# Patient Record
Sex: MALE | Race: OTHER
[De-identification: names, ages, dates, MRNs, and addresses within clinical notes are randomized per-mention and may not be internally consistent; named-entity substitution may affect disease eponyms.]

---

## 2016-12-30 NOTE — ED PROVIDER NOTE - MEDICAL DECISION MAKING DETAILS
36yoM w known UC, presents with rectal bleeding from GI clinic. labs, type and screen, fluids, transfuse if necessary, GI

## 2016-12-30 NOTE — ED PROVIDER NOTE - OBJECTIVE STATEMENT
36yoM hx of UC pw with bloody BM, for past several weeks. pt discharged for UC in September 36yoM hx of UC pw with bloody BM, for past several weeks. pt discharged for UC flare in september and since has been having intermittent bloody BM, mostly bright red mixed with stool, 5-10 episodes per day. Pt also endorses some weakness for past week. pt only able to eat 1 meal a day.  Endorses some mild abdominal pain. denies fevers, chills, n/v, chest pain, sob, dizziness, lightheadedness or other complaints.  OF note, pt has pictures of stools for past few weeks that are bright red, loose stool.

## 2016-12-30 NOTE — H&P ADULT. - ASSESSMENT
35M hx of ulcerative colitis presenting with 1 month hx of worsening BRBPR admitted for ulcerative collitis flare possibly due to c. diff vs progression of the disease.

## 2016-12-30 NOTE — H&P ADULT. - PROBLEM SELECTOR PLAN 1
Pt s/p solu-medrol in ED, will hold other steroids for now  Stool cx, c diff, ova/parasite, blood cx   GI Consult placed  Will check for hepatitis, quantiferon   No antibiotics for now as patient afebrile, no leukocytosis

## 2016-12-30 NOTE — ED ADULT NURSE NOTE - OBJECTIVE STATEMENT
Hx of ulcerative colitis, c/o months of bloody stools recently worsening with weakness. Upon last hospital admission in September pt needed to be transfused. Pt denies fever, recent abx use, n/v, dizziness or abdominal pain.

## 2016-12-30 NOTE — ED PROVIDER NOTE - PROGRESS NOTE DETAILS
discussed case with GI, recommend admission and stool cultures and Cdiff PCR, no antibiotics at this time.

## 2016-12-30 NOTE — H&P ADULT. - HISTORY OF PRESENT ILLNESS
36 yo M hx of ulcerative colitis (diagnosed 2.5 years ago) with admission in September for flare presents with complaint of increasing frequency of blood bowel movements for 1 month as well as weakness, lightheadedness and tiredness. Patient has been off steroids for about 2 months. While on steroids he had been having on average 4 BM with BRB, however, for the past month, they have increased to 10-14 soft/liquid BM with copious amount of BRB. He went to see his Gastroentrologist today who sent him to the ED.   Patient was diagnosed via colon biopsy at [facility] 2 years ago. He was initially on Asacol which he tolerated and kept his UC in check with occasionally BRB with BM. However he missed doses this past summer due to insurance problem and was admitted to [facility] in June for UC flare. Patient was recommended to start on Humira but was reluctant. He was treated with steroids and antibiotics, and discharged on a prednisone taper, levaquin, metronidazole and pentaza.  After discharge his BRBPR improved until he was tapered off the steroids.  Patient subsequently was admitted again in September 2016 to [facility] after having 5-20 episodes of bloody diarrhea daily for two months. Patient was against treated with antibiotics and steroids. He was discharged home on steroid taper which improved his appetite, weight and hematochezia. Patient has only remained off steroids for about 1 month intervals before being hospitalized for flares.     Today patient denies fevers, chills, recent infection or travel, vomiting, or abdominal pain. He was nauseous earlier however tolerated regular diet and feels well now. Patient denies headaches, palpitations, SOB.     In ED Vitals were 98.3, 138/98, HR 88, RR 15, 100%   Pt received 1L NS bolus, Solu medrol 125 IV

## 2016-12-30 NOTE — ED PROVIDER NOTE - ATTENDING CONTRIBUTION TO CARE
I have seen and examined the patient face to face, have reviewed and addended the HPI, PE and a/p as necessary. ( see hpi pe and mdm " att" )  35 yo male with h/o UC, diagnosed 2 yrs ago, here with c/o loose watery and bloody stool. has had 2 hospitilzations in the last 6 mo, most recently here at [facility] in september 2016. reports > 10 bm / day. has been in bed due to severe fatigue, and lightheaded. on steroids recently . no f/c no recent travel no recent abx.   att exam: patient awake alert NAD . LUNGS CTAB no wheeze no crackle. CARD RRR no m/r/g.  Abdomen soft NT ND no rebound no guarding no CVA tenderness. EXT WWP no edema no calf tenderness CV 2+DP/PT bilaterally. nuero A&Ox3 gait normal.  skin warm and dry no rash   plan : differential anemia, dehydration, electorlyte abnormality, uc flare. will d/w house GI, likely steroids bolus, hydraiton cbc lytes and admission.  Cirilli

## 2017-01-01 NOTE — DIETITIAN INITIAL EVALUATION ADULT. - NS AS NUTRI INTERV ED CONTENT
Purpose of the nutrition education/Recommended modifications/Priority modifications/Nutrition relationship to health/disease

## 2017-01-01 NOTE — DIETITIAN INITIAL EVALUATION ADULT. - OTHER INFO
At this time, Pt. reports improved appetite since yesterday, and good PO intake.  Weight has been variable, ranging between ~120-165lbs, since Dx of UC.  He denies food allergies, nausea/vomiting/constipation, or issues with chewing/swallowing at this time. Still remains w loose BMs.  Takes Multivitamin and B-complex supplements PTA.   Pt. provided w extensive verbal & printed nutrition education.  Pt. asks appropriate questions which were answered & discussed.

## 2017-01-01 NOTE — DIETITIAN INITIAL EVALUATION ADULT. - NS AS NUTRI INTERV COLLABORAT
1)Continue fiber restricted diet 2)Consider addition of Vitamin C to aid with iron absorption  3)Suggest Multivitamin for micronutrient coverage  4)Obtain daily weights.

## 2017-01-02 ENCOUNTER — TRANSCRIPTION ENCOUNTER (OUTPATIENT)
Age: 37
End: 2017-01-02

## 2017-01-02 NOTE — DISCHARGE NOTE ADULT - CARE PROVIDER_API CALL
Rohith Franz), Gastroenterology; Internal Medicine  [address] [place] [address]  Phone: [phone]  Fax: [phone]

## 2017-01-02 NOTE — DISCHARGE NOTE ADULT - MEDICATION SUMMARY - MEDICATIONS TO TAKE
I will START or STAY ON the medications listed below when I get home from the hospital:    Asacol  mg oral delayed release tablet  -- 2 tab(s) by mouth 3 times a day  -- Indication: For Ulcerative colitis, chronic, with rectal bleeding    Canasa 1000 mg rectal suppository  -- 1 suppository(ies) rectally once a day (at bedtime)  -- For rectal use only.    -- Indication: For Ulcerative colitis, chronic, with rectal bleeding    mesalamine 4 g/60 mL rectal enema  -- 60 milliliter(s) rectally once a day (at bedtime)  -- For rectal use only.  Shake well before use.    -- Indication: For Ulcerative colitis, chronic, with rectal bleeding    predniSONE 10 mg oral tablet  -- 40mg x1w then 30mg x 1wk, 20mg x 1wk, 15mg x 1wk, 10mg x 1wk then 5mg x 1wk and Stop  -- It is very important that you take or use this exactly as directed.  Do not skip doses or discontinue unless directed by your doctor.  Obtain medical advice before taking any non-prescription drugs as some may affect the action of this medication.  Take with food or milk.    -- Indication: For Ulcerative colitis, chronic, with rectal bleeding    ferrous sulfate 325 mg (65 mg elemental iron) oral tablet  -- 1 tab(s) by mouth once a day  -- Indication: For Need for prophylactic measure    pantoprazole 40 mg oral delayed release tablet  -- 1 tab(s) by mouth once a day  -- It is very important that you take or use this exactly as directed.  Do not skip doses or discontinue unless directed by your doctor.  Obtain medical advice before taking any non-prescription drugs as some may affect the action of this medication.  Swallow whole.  Do not crush.    -- Indication: For Need for prophylactic measure    Cipro 500 mg oral tablet  -- 1 tab(s) by mouth every 12 hours  -- Avoid prolonged or excessive exposure to direct and/or artificial sunlight while taking this medication.  Check with your doctor before becoming pregnant.  Do not take dairy products, antacids, or iron preparations within one hour of this medication.  Finish all this medication unless otherwise directed by prescriber.  Medication should be taken with plenty of water.    -- Indication: For Ulcerative colitis, chronic, with rectal bleeding

## 2017-01-02 NOTE — DISCHARGE NOTE ADULT - CARE PLAN
Principal Discharge DX:	Ulcerative colitis, chronic, with rectal bleeding  Goal:	Maintain remission  Secondary Diagnosis:	CORNELL (acute kidney injury)  Instructions for follow-up, activity and diet:	Please maintain adequate hydration especially when you are having multiple bowel movements a day Principal Discharge DX:	Ulcerative colitis, chronic, with rectal bleeding  Goal:	Maintain remission  Instructions for follow-up, activity and diet:	Take Acecol three times a day as prescribed, and use daily mesalamine suppository. Taper down the prednisone on weekly basis as instructed on the prescription. Please follow up with Dr. Franz (GI) within 2 to 4 weeks.  Secondary Diagnosis:	CORNELL (acute kidney injury)  Instructions for follow-up, activity and diet:	Please maintain adequate hydration especially when you are having multiple bowel movements a day

## 2017-01-02 NOTE — DISCHARGE NOTE ADULT - CARE PROVIDERS DIRECT ADDRESSES
,[email or web address],DirectAddress_Unknown

## 2017-01-02 NOTE — DISCHARGE NOTE ADULT - PLAN OF CARE
Maintain remission Please maintain adequate hydration especially when you are having multiple bowel movements a day Take Acecol three times a day as prescribed, and use daily mesalamine suppository. Taper down the prednisone on weekly basis as instructed on the prescription. Please follow up with Dr. Franz (GI) within 2 to 4 weeks.

## 2017-01-02 NOTE — DISCHARGE NOTE ADULT - HOSPITAL COURSE
37 yo M w pmhx of ulcerative colitis (dx 2 years ago) presenting with 1 month hx of increase bowel frequency with BRBPR. Patient was admitted for ulcerative colitis flare. Stool cultures were negative for infection including c. difficile. Patient was started on steroids, oral mesalamine and mesalamine suppository. 35 yo M w pmhx of ulcerative colitis (dx 2 years ago) presenting with 1 month hx of increase bowel frequency with BRBPR. Patient was admitted for ulcerative colitis flare. Stool cultures were negative for Clostridium. difficile but positive for Aeromonas Hydrophila. Patient was started on steroids, oral mesalamine and mesalamine suppository. He will complete 5 days of Ciprofloxacin 500mg BID. He had Asacol tablets at home and next refill will be due January 28th of 2016 per his Pharmacy when the medication was placed through to his insurance. He was discharged in stable condition. 35 yo M w pmhx of ulcerative colitis (dx 2 years ago) presenting with 1 month hx of increase bowel frequency with BRBPR. Patient was admitted for ulcerative colitis flare. He was afebrile on admission, WBC was wnl, Hb was 12.2, electrolytes were normal. Stool cultures were negative for Clostridium. difficile but positive for Aeromonas Hydrophila. Patient was started on IV Solumedrol, oral mesalamine (Delzicol) and mesalamine suppository (Rowasa). Patient improved clinically and was transitioned to oral prednisone.     Patient is hemodynamically stable for discharge home. He will complete 5 days of oral Ciprofloxacin 500mg BID. He had Asacol tablets at home and next refill will be due January 28th of 2016 per his Pharmacy when the medication was placed through to his insurance. He will continue taking Asacol and mesalamine suppository at home and taper off prednisone slowly as prescribed.

## 2017-01-02 NOTE — DISCHARGE NOTE ADULT - OTHER SIGNIFICANT FINDINGS
CT Abd/Pelvis 12/31  IMPRESSION:  Colitis extending from the descending colon to the rectum consistent with   history of ulcerative colitis.    Quant Gold negative, Hep B surface antigen negative

## 2017-01-02 NOTE — DISCHARGE NOTE ADULT - ADDITIONAL INSTRUCTIONS
Please follow up with your gastroentrologist within 3-5 days of discharge. Please follow up with your gastroentrologist Dr. Franz within 2 weeks of discharge.

## 2017-01-05 ENCOUNTER — MESSAGE (OUTPATIENT)
Age: 37
End: 2017-01-05

## 2017-01-05 ENCOUNTER — OTHER (OUTPATIENT)
Age: 37
End: 2017-01-05

## 2017-01-09 ENCOUNTER — MESSAGE (OUTPATIENT)
Age: 37
End: 2017-01-09

## 2017-01-12 ENCOUNTER — OTHER (OUTPATIENT)
Age: 37
End: 2017-01-12

## 2017-01-12 ENCOUNTER — MOBILE ON CALL (OUTPATIENT)
Age: 37
End: 2017-01-12

## 2017-01-12 ENCOUNTER — MESSAGE (OUTPATIENT)
Age: 37
End: 2017-01-12

## 2017-01-13 ENCOUNTER — APPOINTMENT (OUTPATIENT)
Dept: GASTROENTEROLOGY | Facility: CLINIC | Age: 37
End: 2017-01-13

## 2017-01-13 VITALS
SYSTOLIC BLOOD PRESSURE: 118 MMHG | TEMPERATURE: 97.7 F | HEIGHT: 65 IN | DIASTOLIC BLOOD PRESSURE: 76 MMHG | HEART RATE: 104 BPM | OXYGEN SATURATION: 98 % | RESPIRATION RATE: 15 BRPM | BODY MASS INDEX: 26.82 KG/M2 | WEIGHT: 161 LBS

## 2017-01-31 ENCOUNTER — MESSAGE (OUTPATIENT)
Age: 37
End: 2017-01-31

## 2017-02-14 ENCOUNTER — APPOINTMENT (OUTPATIENT)
Dept: GASTROENTEROLOGY | Facility: CLINIC | Age: 37
End: 2017-02-14

## 2017-02-14 VITALS
HEART RATE: 103 BPM | DIASTOLIC BLOOD PRESSURE: 80 MMHG | OXYGEN SATURATION: 99 % | BODY MASS INDEX: 31.22 KG/M2 | TEMPERATURE: 98.2 F | HEIGHT: 60 IN | WEIGHT: 159 LBS | SYSTOLIC BLOOD PRESSURE: 118 MMHG | RESPIRATION RATE: 15 BRPM

## 2017-03-03 ENCOUNTER — MESSAGE (OUTPATIENT)
Age: 37
End: 2017-03-03

## 2017-03-22 ENCOUNTER — RX RENEWAL (OUTPATIENT)
Age: 37
End: 2017-03-22

## 2017-04-02 ENCOUNTER — INPATIENT (INPATIENT)
Facility: HOSPITAL | Age: 37
LOS: 4 days | Discharge: HOME | End: 2017-04-07
Attending: HOSPITALIST | Admitting: HOSPITALIST
Payer: MEDICAID

## 2017-04-02 VITALS
OXYGEN SATURATION: 100 % | TEMPERATURE: 98 F | HEART RATE: 109 BPM | DIASTOLIC BLOOD PRESSURE: 86 MMHG | SYSTOLIC BLOOD PRESSURE: 118 MMHG | RESPIRATION RATE: 16 BRPM

## 2017-04-02 LAB
ALBUMIN SERPL ELPH-MCNC: 3.7 G/DL — SIGNIFICANT CHANGE UP (ref 3.3–5)
ALP SERPL-CCNC: 70 U/L — SIGNIFICANT CHANGE UP (ref 40–120)
ALT FLD-CCNC: 9 U/L — SIGNIFICANT CHANGE UP (ref 4–41)
AST SERPL-CCNC: 19 U/L — SIGNIFICANT CHANGE UP (ref 4–40)
BASE EXCESS BLDV CALC-SCNC: 5.3 MMOL/L — SIGNIFICANT CHANGE UP
BASOPHILS # BLD AUTO: 0.06 K/UL — SIGNIFICANT CHANGE UP (ref 0–0.2)
BASOPHILS NFR BLD AUTO: 0.4 % — SIGNIFICANT CHANGE UP (ref 0–2)
BILIRUB SERPL-MCNC: 0.2 MG/DL — SIGNIFICANT CHANGE UP (ref 0.2–1.2)
BLOOD GAS VENOUS - CREATININE: 1.39 MG/DL — HIGH (ref 0.5–1.3)
BUN SERPL-MCNC: 9 MG/DL — SIGNIFICANT CHANGE UP (ref 7–23)
CALCIUM SERPL-MCNC: 8.9 MG/DL — SIGNIFICANT CHANGE UP (ref 8.4–10.5)
CHLORIDE BLDV-SCNC: 107 MMOL/L — SIGNIFICANT CHANGE UP (ref 96–108)
CHLORIDE SERPL-SCNC: 100 MMOL/L — SIGNIFICANT CHANGE UP (ref 98–107)
CO2 SERPL-SCNC: 25 MMOL/L — SIGNIFICANT CHANGE UP (ref 22–31)
CREAT SERPL-MCNC: 1.43 MG/DL — HIGH (ref 0.5–1.3)
EOSINOPHIL # BLD AUTO: 1.31 K/UL — HIGH (ref 0–0.5)
EOSINOPHIL NFR BLD AUTO: 7.9 % — HIGH (ref 0–6)
GAS PNL BLDV: 133 MMOL/L — LOW (ref 136–146)
GLUCOSE BLDV-MCNC: 91 — SIGNIFICANT CHANGE UP (ref 70–99)
GLUCOSE SERPL-MCNC: 89 MG/DL — SIGNIFICANT CHANGE UP (ref 70–99)
HCO3 BLDV-SCNC: 28 MMOL/L — HIGH (ref 20–27)
HCT VFR BLD CALC: 37.2 % — LOW (ref 39–50)
HCT VFR BLDV CALC: 33.3 % — LOW (ref 39–51)
HGB BLD-MCNC: 12 G/DL — LOW (ref 13–17)
HGB BLDV-MCNC: 10.8 G/DL — LOW (ref 13–17)
IMM GRANULOCYTES NFR BLD AUTO: 0.2 % — SIGNIFICANT CHANGE UP (ref 0–1.5)
LACTATE BLDV-MCNC: 1.4 MMOL/L — SIGNIFICANT CHANGE UP (ref 0.5–2)
LYMPHOCYTES # BLD AUTO: 22.8 % — SIGNIFICANT CHANGE UP (ref 13–44)
LYMPHOCYTES # BLD AUTO: 3.78 K/UL — HIGH (ref 1–3.3)
MCHC RBC-ENTMCNC: 25.8 PG — LOW (ref 27–34)
MCHC RBC-ENTMCNC: 32.3 % — SIGNIFICANT CHANGE UP (ref 32–36)
MCV RBC AUTO: 80 FL — SIGNIFICANT CHANGE UP (ref 80–100)
MONOCYTES # BLD AUTO: 1.57 K/UL — HIGH (ref 0–0.9)
MONOCYTES NFR BLD AUTO: 9.5 % — SIGNIFICANT CHANGE UP (ref 2–14)
NEUTROPHILS # BLD AUTO: 9.84 K/UL — HIGH (ref 1.8–7.4)
NEUTROPHILS NFR BLD AUTO: 59.2 % — SIGNIFICANT CHANGE UP (ref 43–77)
PCO2 BLDV: 47 MMHG — SIGNIFICANT CHANGE UP (ref 41–51)
PH BLDV: 7.41 PH — SIGNIFICANT CHANGE UP (ref 7.32–7.43)
PLATELET # BLD AUTO: 397 K/UL — SIGNIFICANT CHANGE UP (ref 150–400)
PMV BLD: 10.3 FL — SIGNIFICANT CHANGE UP (ref 7–13)
PO2 BLDV: < 24 MMHG — LOW (ref 35–40)
POTASSIUM BLDV-SCNC: 4.2 MMOL/L — SIGNIFICANT CHANGE UP (ref 3.4–4.5)
POTASSIUM SERPL-MCNC: 4.4 MMOL/L — SIGNIFICANT CHANGE UP (ref 3.5–5.3)
POTASSIUM SERPL-SCNC: 4.4 MMOL/L — SIGNIFICANT CHANGE UP (ref 3.5–5.3)
PROT SERPL-MCNC: 7.4 G/DL — SIGNIFICANT CHANGE UP (ref 6–8.3)
RBC # BLD: 4.65 M/UL — SIGNIFICANT CHANGE UP (ref 4.2–5.8)
RBC # FLD: 15.8 % — HIGH (ref 10.3–14.5)
SAO2 % BLDV: 34.3 % — LOW (ref 60–85)
SODIUM SERPL-SCNC: 141 MMOL/L — SIGNIFICANT CHANGE UP (ref 135–145)
WBC # BLD: 16.59 K/UL — HIGH (ref 3.8–10.5)
WBC # FLD AUTO: 16.59 K/UL — HIGH (ref 3.8–10.5)

## 2017-04-02 RX ORDER — SODIUM CHLORIDE 9 MG/ML
1000 INJECTION INTRAMUSCULAR; INTRAVENOUS; SUBCUTANEOUS ONCE
Qty: 0 | Refills: 0 | Status: COMPLETED | OUTPATIENT
Start: 2017-04-02 | End: 2017-04-02

## 2017-04-02 RX ORDER — ONDANSETRON 8 MG/1
4 TABLET, FILM COATED ORAL ONCE
Qty: 0 | Refills: 0 | Status: DISCONTINUED | OUTPATIENT
Start: 2017-04-02 | End: 2017-04-02

## 2017-04-02 RX ORDER — CIPROFLOXACIN LACTATE 400MG/40ML
400 VIAL (ML) INTRAVENOUS ONCE
Qty: 0 | Refills: 0 | Status: COMPLETED | OUTPATIENT
Start: 2017-04-02 | End: 2017-04-02

## 2017-04-02 RX ORDER — METRONIDAZOLE 500 MG
500 TABLET ORAL ONCE
Qty: 0 | Refills: 0 | Status: COMPLETED | OUTPATIENT
Start: 2017-04-02 | End: 2017-04-02

## 2017-04-02 RX ORDER — ONDANSETRON 8 MG/1
4 TABLET, FILM COATED ORAL ONCE
Qty: 0 | Refills: 0 | Status: COMPLETED | OUTPATIENT
Start: 2017-04-02 | End: 2017-04-02

## 2017-04-02 RX ADMIN — SODIUM CHLORIDE 1000 MILLILITER(S): 9 INJECTION INTRAMUSCULAR; INTRAVENOUS; SUBCUTANEOUS at 23:00

## 2017-04-02 RX ADMIN — ONDANSETRON 4 MILLIGRAM(S): 8 TABLET, FILM COATED ORAL at 23:08

## 2017-04-02 RX ADMIN — Medication 200 MILLIGRAM(S): at 23:08

## 2017-04-02 NOTE — ED ADULT NURSE NOTE - OBJECTIVE STATEMENT
Pt presents to ED R#8 Aox4, in NAD, c/o bloody diarrhea x2 weeks r/t UC flare, with N/V x1 day with +chills/ subjective fever. Endorses mild abdominal discomfort. Denies CP/ SOB/ dizziness/ weakness/ SOB/ CP/ other acute medical complaints. IV inserted, BW collected and sent. VSS, awaiting test results. Will continue to monitor.

## 2017-04-02 NOTE — ED PROVIDER NOTE - OBJECTIVE STATEMENT
36M w/ hx of UC who has not been able to take his medications recently 2/2 insurance change presents today with 20 episodes of bloody diarrhea per day for the last 4 days. Pt has had abd tenderness worse in the lower abdomen and HA. Pt not able to tolerate much in the way of PO in the last few day. Had one episode of NBNB emesis earlier today. Denies hematuria, dizziness, chest pain, SOB.

## 2017-04-02 NOTE — ED ADULT TRIAGE NOTE - CHIEF COMPLAINT QUOTE
Pt arrives w/ c/o ulcerative colitis flare w/ symptoms of bloody diarrhea for the last two weeks accompanied w/ vomiting today.

## 2017-04-02 NOTE — ED PROVIDER NOTE - ATTENDING CONTRIBUTION TO CARE
SUSI Attending Note - Dr. Erickson 36M w/ hx of UC who has not been able to take his medications recently 2/2 insurance change presents today with 20 episodes of bloody diarrhea per day for the last 4 days  PE: pt is alert and oriented, perrl, ent normal, membranes are moist, neck supple. no lymphadenopathy or thyroid enlargement, No JVD.  Chest clear to P&A, Heart- reg rhythm without murmur, rubs or gallops, radial pulses equal bilaterally.  Abd is soft, non-tender, Bowel sounds are active. no mass or organomegaly. : No CVA tenderness. Neuro:  Pt alert and oriented x 3. Perrl    Distal neurosensory is intact. Motor function is 5/5 strength bilaterally.  No focal deficits. Extremities:  No edema.  Skin: warm and dry.

## 2017-04-02 NOTE — ED ADULT NURSE NOTE - NS ED NOTE  TALK SOMEONE YN
Fillmore Belén   MRN: [id number]    Department:  [facility] Emergency Department   Date of Visit:  6/10/2018           Disclosure     Insurance plans vary and the physician(s) referred by the ER may not be covered by your plan.  Sera CARE PHYSICIAN AT ONCE OR RETURN IMMEDIATELY TO THE EMERGENCY DEPARTMENT. If you have been prescribed any medication(s), please fill your prescription right away and begin taking the medication(s) as directed.   If you believe that any of the medications no

## 2017-04-02 NOTE — ED PROVIDER NOTE - MEDICAL DECISION MAKING DETAILS
36M w/ hx UC with new UC flare. Basic and abd labs, blood cultures. IVF, zofran, cipro/flagyl. Admit to medicine.

## 2017-04-03 ENCOUNTER — RX RENEWAL (OUTPATIENT)
Age: 37
End: 2017-04-03

## 2017-04-03 DIAGNOSIS — K51.919 ULCERATIVE COLITIS, UNSPECIFIED WITH UNSPECIFIED COMPLICATIONS: ICD-10-CM

## 2017-04-03 DIAGNOSIS — D50.0 IRON DEFICIENCY ANEMIA SECONDARY TO BLOOD LOSS (CHRONIC): ICD-10-CM

## 2017-04-03 DIAGNOSIS — R09.81 NASAL CONGESTION: ICD-10-CM

## 2017-04-03 DIAGNOSIS — A41.9 SEPSIS, UNSPECIFIED ORGANISM: ICD-10-CM

## 2017-04-03 DIAGNOSIS — K51.90 ULCERATIVE COLITIS, UNSPECIFIED, WITHOUT COMPLICATIONS: ICD-10-CM

## 2017-04-03 DIAGNOSIS — N17.9 ACUTE KIDNEY FAILURE, UNSPECIFIED: ICD-10-CM

## 2017-04-03 DIAGNOSIS — Z41.8 ENCOUNTER FOR OTHER PROCEDURES FOR PURPOSES OTHER THAN REMEDYING HEALTH STATE: ICD-10-CM

## 2017-04-03 LAB
BASOPHILS # BLD AUTO: 0.07 K/UL — SIGNIFICANT CHANGE UP (ref 0–0.2)
BASOPHILS NFR BLD AUTO: 0.4 % — SIGNIFICANT CHANGE UP (ref 0–2)
BLD GP AB SCN SERPL QL: NEGATIVE — SIGNIFICANT CHANGE UP
BUN SERPL-MCNC: 8 MG/DL — SIGNIFICANT CHANGE UP (ref 7–23)
C DIFF TOX GENS STL QL NAA+PROBE: SIGNIFICANT CHANGE UP
CALCIUM SERPL-MCNC: 8.2 MG/DL — LOW (ref 8.4–10.5)
CHLORIDE SERPL-SCNC: 103 MMOL/L — SIGNIFICANT CHANGE UP (ref 98–107)
CO2 SERPL-SCNC: 22 MMOL/L — SIGNIFICANT CHANGE UP (ref 22–31)
CREAT SERPL-MCNC: 1.34 MG/DL — HIGH (ref 0.5–1.3)
CRP SERPL-MCNC: 16.4 MG/L — HIGH (ref 0.3–5)
EOSINOPHIL # BLD AUTO: 1.75 K/UL — HIGH (ref 0–0.5)
EOSINOPHIL NFR BLD AUTO: 9.7 % — HIGH (ref 0–6)
ERYTHROCYTE [SEDIMENTATION RATE] IN BLOOD: 42 MM/HR — HIGH (ref 1–15)
GLUCOSE SERPL-MCNC: 111 MG/DL — HIGH (ref 70–99)
HCT VFR BLD CALC: 33.4 % — LOW (ref 39–50)
HGB BLD-MCNC: 10.9 G/DL — LOW (ref 13–17)
IMM GRANULOCYTES NFR BLD AUTO: 0.3 % — SIGNIFICANT CHANGE UP (ref 0–1.5)
LYMPHOCYTES # BLD AUTO: 27.9 % — SIGNIFICANT CHANGE UP (ref 13–44)
LYMPHOCYTES # BLD AUTO: 5.04 K/UL — HIGH (ref 1–3.3)
MAGNESIUM SERPL-MCNC: 1.9 MG/DL — SIGNIFICANT CHANGE UP (ref 1.6–2.6)
MCHC RBC-ENTMCNC: 25.8 PG — LOW (ref 27–34)
MCHC RBC-ENTMCNC: 32.6 % — SIGNIFICANT CHANGE UP (ref 32–36)
MCV RBC AUTO: 79 FL — LOW (ref 80–100)
MONOCYTES # BLD AUTO: 2.13 K/UL — HIGH (ref 0–0.9)
MONOCYTES NFR BLD AUTO: 11.8 % — SIGNIFICANT CHANGE UP (ref 2–14)
NEUTROPHILS # BLD AUTO: 9 K/UL — HIGH (ref 1.8–7.4)
NEUTROPHILS NFR BLD AUTO: 49.9 % — SIGNIFICANT CHANGE UP (ref 43–77)
PHOSPHATE SERPL-MCNC: 3.3 MG/DL — SIGNIFICANT CHANGE UP (ref 2.5–4.5)
PLATELET # BLD AUTO: 378 K/UL — SIGNIFICANT CHANGE UP (ref 150–400)
PMV BLD: 9.7 FL — SIGNIFICANT CHANGE UP (ref 7–13)
POTASSIUM SERPL-MCNC: 4.3 MMOL/L — SIGNIFICANT CHANGE UP (ref 3.5–5.3)
POTASSIUM SERPL-SCNC: 4.3 MMOL/L — SIGNIFICANT CHANGE UP (ref 3.5–5.3)
RBC # BLD: 4.23 M/UL — SIGNIFICANT CHANGE UP (ref 4.2–5.8)
RBC # FLD: 15.8 % — HIGH (ref 10.3–14.5)
RH IG SCN BLD-IMP: POSITIVE — SIGNIFICANT CHANGE UP
SODIUM SERPL-SCNC: 138 MMOL/L — SIGNIFICANT CHANGE UP (ref 135–145)
SPECIMEN SOURCE: SIGNIFICANT CHANGE UP
SPECIMEN SOURCE: SIGNIFICANT CHANGE UP
WBC # BLD: 18.04 K/UL — HIGH (ref 3.8–10.5)
WBC # FLD AUTO: 18.04 K/UL — HIGH (ref 3.8–10.5)

## 2017-04-03 PROCEDURE — 93010 ELECTROCARDIOGRAM REPORT: CPT

## 2017-04-03 PROCEDURE — 99222 1ST HOSP IP/OBS MODERATE 55: CPT | Mod: GC

## 2017-04-03 PROCEDURE — 74177 CT ABD & PELVIS W/CONTRAST: CPT | Mod: 26

## 2017-04-03 PROCEDURE — 99223 1ST HOSP IP/OBS HIGH 75: CPT | Mod: GC

## 2017-04-03 RX ORDER — ACETAMINOPHEN 500 MG
650 TABLET ORAL EVERY 6 HOURS
Qty: 0 | Refills: 0 | Status: DISCONTINUED | OUTPATIENT
Start: 2017-04-03 | End: 2017-04-07

## 2017-04-03 RX ORDER — ONDANSETRON 8 MG/1
4 TABLET, FILM COATED ORAL EVERY 8 HOURS
Qty: 0 | Refills: 0 | Status: DISCONTINUED | OUTPATIENT
Start: 2017-04-03 | End: 2017-04-07

## 2017-04-03 RX ORDER — MESALAMINE 400 MG
4 TABLET, DELAYED RELEASE (ENTERIC COATED) ORAL AT BEDTIME
Qty: 0 | Refills: 0 | Status: DISCONTINUED | OUTPATIENT
Start: 2017-04-03 | End: 2017-04-07

## 2017-04-03 RX ORDER — SODIUM CHLORIDE 0.65 %
1 AEROSOL, SPRAY (ML) NASAL DAILY
Qty: 0 | Refills: 0 | Status: DISCONTINUED | OUTPATIENT
Start: 2017-04-03 | End: 2017-04-07

## 2017-04-03 RX ORDER — MESALAMINE 400 MG
4 TABLET, DELAYED RELEASE (ENTERIC COATED) ORAL AT BEDTIME
Qty: 0 | Refills: 0 | Status: DISCONTINUED | OUTPATIENT
Start: 2017-04-03 | End: 2017-04-03

## 2017-04-03 RX ORDER — SODIUM CHLORIDE 9 MG/ML
1000 INJECTION, SOLUTION INTRAVENOUS
Qty: 0 | Refills: 0 | Status: DISCONTINUED | OUTPATIENT
Start: 2017-04-03 | End: 2017-04-06

## 2017-04-03 RX ORDER — LACTOBACILLUS ACIDOPHILUS 100MM CELL
1 CAPSULE ORAL EVERY 12 HOURS
Qty: 0 | Refills: 0 | Status: DISCONTINUED | OUTPATIENT
Start: 2017-04-03 | End: 2017-04-07

## 2017-04-03 RX ORDER — METRONIDAZOLE 500 MG
500 TABLET ORAL EVERY 8 HOURS
Qty: 0 | Refills: 0 | Status: DISCONTINUED | OUTPATIENT
Start: 2017-04-03 | End: 2017-04-04

## 2017-04-03 RX ORDER — SODIUM CHLORIDE 9 MG/ML
1000 INJECTION INTRAMUSCULAR; INTRAVENOUS; SUBCUTANEOUS ONCE
Qty: 0 | Refills: 0 | Status: COMPLETED | OUTPATIENT
Start: 2017-04-03 | End: 2017-04-03

## 2017-04-03 RX ORDER — PANTOPRAZOLE SODIUM 20 MG/1
40 TABLET, DELAYED RELEASE ORAL
Qty: 0 | Refills: 0 | Status: DISCONTINUED | OUTPATIENT
Start: 2017-04-03 | End: 2017-04-07

## 2017-04-03 RX ORDER — CIPROFLOXACIN LACTATE 400MG/40ML
400 VIAL (ML) INTRAVENOUS EVERY 12 HOURS
Qty: 0 | Refills: 0 | Status: DISCONTINUED | OUTPATIENT
Start: 2017-04-03 | End: 2017-04-04

## 2017-04-03 RX ORDER — ENOXAPARIN SODIUM 100 MG/ML
40 INJECTION SUBCUTANEOUS EVERY 24 HOURS
Qty: 0 | Refills: 0 | Status: DISCONTINUED | OUTPATIENT
Start: 2017-04-03 | End: 2017-04-03

## 2017-04-03 RX ORDER — HEPARIN SODIUM 5000 [USP'U]/ML
5000 INJECTION INTRAVENOUS; SUBCUTANEOUS EVERY 12 HOURS
Qty: 0 | Refills: 0 | Status: DISCONTINUED | OUTPATIENT
Start: 2017-04-03 | End: 2017-04-06

## 2017-04-03 RX ORDER — MESALAMINE 400 MG
1600 TABLET, DELAYED RELEASE (ENTERIC COATED) ORAL THREE TIMES A DAY
Qty: 0 | Refills: 0 | Status: DISCONTINUED | OUTPATIENT
Start: 2017-04-03 | End: 2017-04-07

## 2017-04-03 RX ADMIN — Medication 40 MILLIGRAM(S): at 09:28

## 2017-04-03 RX ADMIN — Medication 100 MILLIGRAM(S): at 14:49

## 2017-04-03 RX ADMIN — Medication 100 MILLIGRAM(S): at 22:43

## 2017-04-03 RX ADMIN — SODIUM CHLORIDE 100 MILLILITER(S): 9 INJECTION, SOLUTION INTRAVENOUS at 14:50

## 2017-04-03 RX ADMIN — HEPARIN SODIUM 5000 UNIT(S): 5000 INJECTION INTRAVENOUS; SUBCUTANEOUS at 18:49

## 2017-04-03 RX ADMIN — Medication 200 MILLIGRAM(S): at 18:49

## 2017-04-03 RX ADMIN — Medication 1 TABLET(S): at 18:49

## 2017-04-03 RX ADMIN — Medication 1600 MILLIGRAM(S): at 10:44

## 2017-04-03 RX ADMIN — SODIUM CHLORIDE 1000 MILLILITER(S): 9 INJECTION INTRAMUSCULAR; INTRAVENOUS; SUBCUTANEOUS at 01:33

## 2017-04-03 RX ADMIN — PANTOPRAZOLE SODIUM 40 MILLIGRAM(S): 20 TABLET, DELAYED RELEASE ORAL at 07:41

## 2017-04-03 RX ADMIN — Medication 1600 MILLIGRAM(S): at 22:43

## 2017-04-03 RX ADMIN — Medication 1600 MILLIGRAM(S): at 18:50

## 2017-04-03 RX ADMIN — Medication 100 MILLIGRAM(S): at 00:21

## 2017-04-03 NOTE — H&P ADULT. - PROBLEM SELECTOR PLAN 1
- f/u C.diff  - f/u bcx Meets sepsis criteria by leukocytosis, tachycardia, and colitis. Likely severe UC flare. Leukocytosis and tachycardia may be 2/2 volume depletion in the setting of bloody diarrhea, but given pt's intermittent subjective fevers, hx of positive Aeromonas Hydrophila (sensitive to cipro), and uptrending leukocytosis, will treat empirically for bacterial colitis.   - c/w cipro/flagyl  - f/u C.diff  - f/u bcx  - obtain stool cx, O&P x 3  - c/w IVF

## 2017-04-03 NOTE — H&P ADULT. - PROBLEM SELECTOR PLAN 2
- obtain ESR, CRP 2/2 medication noncompliance  - obtain ESR, CRP  - start prednisone and mesalamine 4g rectally  - GI consulted- f/u recs  - clear liquid diet for now 2/2 medication noncompliance  - obtain ESR, CRP  - start prednisone 40mg and mesalamine 4g rectally  - GI consulted- f/u recs  - clear liquid diet for now

## 2017-04-03 NOTE — H&P ADULT. - ATTENDING COMMENTS
35 y/o male HX of Ulcerative Colitis, Noncompliant with meds due to Insurance issues, Multiple UC Flare , + Blood Diarrhea x 2 weeks, Abdominal pain, + fever at home ? as per pt, + HA, + Nasal congestion , + N/V , decreased PO intake, pt was started on Tapering PO Prednisone on last admission, + CORNELL as per lab, BUN 8, Creatinine 1.34, WBC 18.04, Hgb 10.9, ESR 42 ,    GI consult, IV Cipro, IV Flagyl, IVF LR @ 100 cc/hr, F/U CT A/P Official result,  Stool C Diff, Stool Culture, Stool O+P, start PO Prednisone 40 mg QD, PO Protonix, Mesalamine , DVT Prophylaxis: SQ Heparin, F/U CBC, CMP,  Fall/aspiration precaution, Clear Liquid Diet, Bacid, ECG     Case D/W Pt, HS,     Pt was seen & Examined by me, Dr. CHELSEA Cerna on 4/3/2017.

## 2017-04-03 NOTE — H&P ADULT. - PROBLEM SELECTOR PLAN 3
Likely prerenal azotemia/ ATN 2/2 hemodynamically mediated vs. dehydration, s/p IVF  - c/w LR  - monitor BMP

## 2017-04-03 NOTE — H&P ADULT. - LAB RESULTS AND INTERPRETATION
Labs reviewed by me: Leukocytosis 16, normocytic anemia Hb 12.0 (baseline 10-12), CORNELL (creatinine 1.43, baseline 0.98), lactate 1.4

## 2017-04-03 NOTE — H&P ADULT. - ASSESSMENT
36M PMH UC (diagnosed 2 years ago), hx of multiple flares 2/2 noncompliance with meds due to insurance issues, recent admission 12/30 - 1/4/17, a/w sepsis in the setting of UC flare. 36M PMH UC (diagnosed 2 years ago), hx of multiple flares 2/2 noncompliance with meds due to insurance issues, recent admission 12/30 - 1/4/17, a/w sepsis 2/2 colitis in the setting of severe UC flare.

## 2017-04-03 NOTE — H&P ADULT. - GASTROINTESTINAL DETAILS
soft/bowel sounds normal/no distention/mildly tender diffusely/no rebound tenderness soft/bowel sounds normal/no distention/mildly tender diffusely/no guarding/no rebound tenderness

## 2017-04-03 NOTE — H&P ADULT. - HISTORY OF PRESENT ILLNESS
36M PMH UC (diagnosed 2 years ago), hx of multiple flares 2/2 noncompliance with meds due to insurance issues, recent admission 12/30 - 1/4/17, p/w acute on chronic bloody diarrhea x 7 days. Reports 20 episodes of blood diarrhea per day, which has increased from bloody diarrhea in the past 3 weeks. Endorses associated intermittent diffuse abdominal "discomfort," nonradiating, rates it 5/10, no alleviating/exacerbating factors, self- resolves, typical of his UC flare. States he has been fired him his job and lost his insurance, currently has Medicaid. Due to canasa and asacol coverage during the interim, he has not been on any meds since about 3 weeks ago. Reports intermittent subjective fevers, but none recorded, denies chills. Pt came to ED today in the setting of head and nasal congestion x 2 days, and one episode of NBNB emesis today. Endorses poor PO intake for days. Denies sick contacts or recent travel. Of note, during recent admission for UC flare, stool cx grew positive for Aeromonas Hydrophila, he completed 5 days of cipro. Completed prednisone taper 4-5 weeks ago.     In ED: 97.9F -111 /78 - 118/86 RR 16-18 SpO2 100% RA  Received cipro 400, flagyl 500, 2L NS boluses, currently on LR @100cc/hr, zofran 4mg

## 2017-04-03 NOTE — H&P ADULT. - NEGATIVE ENMT SYMPTOMS
no hearing difficulty no dysphagia/no gum bleeding/no vertigo/no hearing difficulty/no throat pain/no nose bleeds

## 2017-04-04 ENCOUNTER — APPOINTMENT (OUTPATIENT)
Dept: GASTROENTEROLOGY | Facility: CLINIC | Age: 37
End: 2017-04-04

## 2017-04-04 LAB
ALBUMIN SERPL ELPH-MCNC: 3 G/DL — LOW (ref 3.3–5)
ALP SERPL-CCNC: 48 U/L — SIGNIFICANT CHANGE UP (ref 40–120)
ALT FLD-CCNC: 8 U/L — SIGNIFICANT CHANGE UP (ref 4–41)
AST SERPL-CCNC: 11 U/L — SIGNIFICANT CHANGE UP (ref 4–40)
BASOPHILS # BLD AUTO: 0.02 K/UL — SIGNIFICANT CHANGE UP (ref 0–0.2)
BASOPHILS NFR BLD AUTO: 0.1 % — SIGNIFICANT CHANGE UP (ref 0–2)
BILIRUB SERPL-MCNC: < 0.2 MG/DL — LOW (ref 0.2–1.2)
BUN SERPL-MCNC: 9 MG/DL — SIGNIFICANT CHANGE UP (ref 7–23)
CALCIUM SERPL-MCNC: 8.1 MG/DL — LOW (ref 8.4–10.5)
CHLORIDE SERPL-SCNC: 107 MMOL/L — SIGNIFICANT CHANGE UP (ref 98–107)
CO2 SERPL-SCNC: 22 MMOL/L — SIGNIFICANT CHANGE UP (ref 22–31)
CREAT SERPL-MCNC: 1.24 MG/DL — SIGNIFICANT CHANGE UP (ref 0.5–1.3)
EOSINOPHIL # BLD AUTO: 0.1 K/UL — SIGNIFICANT CHANGE UP (ref 0–0.5)
EOSINOPHIL NFR BLD AUTO: 0.7 % — SIGNIFICANT CHANGE UP (ref 0–6)
GLUCOSE SERPL-MCNC: 112 MG/DL — HIGH (ref 70–99)
HCT VFR BLD CALC: 28.2 % — LOW (ref 39–50)
HGB BLD-MCNC: 9.2 G/DL — LOW (ref 13–17)
IMM GRANULOCYTES NFR BLD AUTO: 0.4 % — SIGNIFICANT CHANGE UP (ref 0–1.5)
LYMPHOCYTES # BLD AUTO: 2.8 K/UL — SIGNIFICANT CHANGE UP (ref 1–3.3)
LYMPHOCYTES # BLD AUTO: 20 % — SIGNIFICANT CHANGE UP (ref 13–44)
MAGNESIUM SERPL-MCNC: 2.1 MG/DL — SIGNIFICANT CHANGE UP (ref 1.6–2.6)
MCHC RBC-ENTMCNC: 26.1 PG — LOW (ref 27–34)
MCHC RBC-ENTMCNC: 32.6 % — SIGNIFICANT CHANGE UP (ref 32–36)
MCV RBC AUTO: 79.9 FL — LOW (ref 80–100)
MONOCYTES # BLD AUTO: 1.7 K/UL — HIGH (ref 0–0.9)
MONOCYTES NFR BLD AUTO: 12.1 % — SIGNIFICANT CHANGE UP (ref 2–14)
NEUTROPHILS # BLD AUTO: 9.34 K/UL — HIGH (ref 1.8–7.4)
NEUTROPHILS NFR BLD AUTO: 66.7 % — SIGNIFICANT CHANGE UP (ref 43–77)
O+P SPEC CONC: SIGNIFICANT CHANGE UP
O+P SPEC CONC: SIGNIFICANT CHANGE UP
PHOSPHATE SERPL-MCNC: 4.2 MG/DL — SIGNIFICANT CHANGE UP (ref 2.5–4.5)
PLATELET # BLD AUTO: 339 K/UL — SIGNIFICANT CHANGE UP (ref 150–400)
PMV BLD: 9.8 FL — SIGNIFICANT CHANGE UP (ref 7–13)
POTASSIUM SERPL-MCNC: 3.9 MMOL/L — SIGNIFICANT CHANGE UP (ref 3.5–5.3)
POTASSIUM SERPL-SCNC: 3.9 MMOL/L — SIGNIFICANT CHANGE UP (ref 3.5–5.3)
PROT SERPL-MCNC: 5.6 G/DL — LOW (ref 6–8.3)
RBC # BLD: 3.53 M/UL — LOW (ref 4.2–5.8)
RBC # FLD: 16 % — HIGH (ref 10.3–14.5)
SODIUM SERPL-SCNC: 143 MMOL/L — SIGNIFICANT CHANGE UP (ref 135–145)
SPECIMEN SOURCE: SIGNIFICANT CHANGE UP
SPECIMEN SOURCE: SIGNIFICANT CHANGE UP
WBC # BLD: 14.01 K/UL — HIGH (ref 3.8–10.5)
WBC # FLD AUTO: 14.01 K/UL — HIGH (ref 3.8–10.5)

## 2017-04-04 PROCEDURE — 99233 SBSQ HOSP IP/OBS HIGH 50: CPT | Mod: GC

## 2017-04-04 PROCEDURE — 99232 SBSQ HOSP IP/OBS MODERATE 35: CPT | Mod: GC

## 2017-04-04 RX ADMIN — Medication 100 MILLIGRAM(S): at 06:13

## 2017-04-04 RX ADMIN — PANTOPRAZOLE SODIUM 40 MILLIGRAM(S): 20 TABLET, DELAYED RELEASE ORAL at 06:12

## 2017-04-04 RX ADMIN — Medication 1 TABLET(S): at 17:23

## 2017-04-04 RX ADMIN — Medication 1 TABLET(S): at 06:12

## 2017-04-04 RX ADMIN — Medication 40 MILLIGRAM(S): at 14:23

## 2017-04-04 RX ADMIN — SODIUM CHLORIDE 100 MILLILITER(S): 9 INJECTION, SOLUTION INTRAVENOUS at 14:23

## 2017-04-04 RX ADMIN — Medication 40 MILLIGRAM(S): at 22:19

## 2017-04-04 RX ADMIN — Medication 200 MILLIGRAM(S): at 07:43

## 2017-04-04 RX ADMIN — Medication 1600 MILLIGRAM(S): at 06:12

## 2017-04-04 RX ADMIN — Medication 1600 MILLIGRAM(S): at 14:22

## 2017-04-04 RX ADMIN — Medication 4 GRAM(S): at 22:19

## 2017-04-04 RX ADMIN — Medication 1600 MILLIGRAM(S): at 22:19

## 2017-04-04 RX ADMIN — Medication 40 MILLIGRAM(S): at 06:12

## 2017-04-05 ENCOUNTER — TRANSCRIPTION ENCOUNTER (OUTPATIENT)
Age: 37
End: 2017-04-05

## 2017-04-05 LAB
BASOPHILS # BLD AUTO: 0.02 K/UL — SIGNIFICANT CHANGE UP (ref 0–0.2)
BASOPHILS NFR BLD AUTO: 0.1 % — SIGNIFICANT CHANGE UP (ref 0–2)
BLD GP AB SCN SERPL QL: NEGATIVE — SIGNIFICANT CHANGE UP
BUN SERPL-MCNC: 11 MG/DL — SIGNIFICANT CHANGE UP (ref 7–23)
CALCIUM SERPL-MCNC: 8.4 MG/DL — SIGNIFICANT CHANGE UP (ref 8.4–10.5)
CHLORIDE SERPL-SCNC: 108 MMOL/L — HIGH (ref 98–107)
CO2 SERPL-SCNC: 21 MMOL/L — LOW (ref 22–31)
CREAT SERPL-MCNC: 1.17 MG/DL — SIGNIFICANT CHANGE UP (ref 0.5–1.3)
EOSINOPHIL # BLD AUTO: 0 K/UL — SIGNIFICANT CHANGE UP (ref 0–0.5)
EOSINOPHIL NFR BLD AUTO: 0 % — SIGNIFICANT CHANGE UP (ref 0–6)
GLUCOSE SERPL-MCNC: 154 MG/DL — HIGH (ref 70–99)
HCT VFR BLD CALC: 30.4 % — LOW (ref 39–50)
HGB BLD-MCNC: 9.6 G/DL — LOW (ref 13–17)
IMM GRANULOCYTES NFR BLD AUTO: 1 % — SIGNIFICANT CHANGE UP (ref 0–1.5)
LYMPHOCYTES # BLD AUTO: 14.7 % — SIGNIFICANT CHANGE UP (ref 13–44)
LYMPHOCYTES # BLD AUTO: 2.89 K/UL — SIGNIFICANT CHANGE UP (ref 1–3.3)
MAGNESIUM SERPL-MCNC: 2.1 MG/DL — SIGNIFICANT CHANGE UP (ref 1.6–2.6)
MCHC RBC-ENTMCNC: 25.2 PG — LOW (ref 27–34)
MCHC RBC-ENTMCNC: 31.6 % — LOW (ref 32–36)
MCV RBC AUTO: 79.8 FL — LOW (ref 80–100)
MONOCYTES # BLD AUTO: 2.24 K/UL — HIGH (ref 0–0.9)
MONOCYTES NFR BLD AUTO: 11.4 % — SIGNIFICANT CHANGE UP (ref 2–14)
NEUTROPHILS # BLD AUTO: 14.26 K/UL — HIGH (ref 1.8–7.4)
NEUTROPHILS NFR BLD AUTO: 72.8 % — SIGNIFICANT CHANGE UP (ref 43–77)
PHOSPHATE SERPL-MCNC: 2.4 MG/DL — LOW (ref 2.5–4.5)
PLATELET # BLD AUTO: 404 K/UL — HIGH (ref 150–400)
PMV BLD: 9.6 FL — SIGNIFICANT CHANGE UP (ref 7–13)
POTASSIUM SERPL-MCNC: 3.9 MMOL/L — SIGNIFICANT CHANGE UP (ref 3.5–5.3)
POTASSIUM SERPL-SCNC: 3.9 MMOL/L — SIGNIFICANT CHANGE UP (ref 3.5–5.3)
RBC # BLD: 3.81 M/UL — LOW (ref 4.2–5.8)
RBC # FLD: 16.2 % — HIGH (ref 10.3–14.5)
RH IG SCN BLD-IMP: POSITIVE — SIGNIFICANT CHANGE UP
SODIUM SERPL-SCNC: 146 MMOL/L — HIGH (ref 135–145)
SPECIMEN SOURCE: SIGNIFICANT CHANGE UP
WBC # BLD: 19.6 K/UL — HIGH (ref 3.8–10.5)
WBC # FLD AUTO: 19.6 K/UL — HIGH (ref 3.8–10.5)

## 2017-04-05 PROCEDURE — 99232 SBSQ HOSP IP/OBS MODERATE 35: CPT | Mod: GC

## 2017-04-05 PROCEDURE — 99233 SBSQ HOSP IP/OBS HIGH 50: CPT | Mod: GC

## 2017-04-05 RX ORDER — SODIUM CHLORIDE 9 MG/ML
1000 INJECTION, SOLUTION INTRAVENOUS
Qty: 0 | Refills: 0 | Status: DISCONTINUED | OUTPATIENT
Start: 2017-04-05 | End: 2017-04-07

## 2017-04-05 RX ORDER — DEXTROSE 50 % IN WATER 50 %
1 SYRINGE (ML) INTRAVENOUS ONCE
Qty: 0 | Refills: 0 | Status: DISCONTINUED | OUTPATIENT
Start: 2017-04-05 | End: 2017-04-07

## 2017-04-05 RX ORDER — SODIUM,POTASSIUM PHOSPHATES 278-250MG
1 POWDER IN PACKET (EA) ORAL
Qty: 0 | Refills: 0 | Status: COMPLETED | OUTPATIENT
Start: 2017-04-05 | End: 2017-04-05

## 2017-04-05 RX ORDER — GLUCAGON INJECTION, SOLUTION 0.5 MG/.1ML
1 INJECTION, SOLUTION SUBCUTANEOUS ONCE
Qty: 0 | Refills: 0 | Status: DISCONTINUED | OUTPATIENT
Start: 2017-04-05 | End: 2017-04-07

## 2017-04-05 RX ORDER — DEXTROSE 50 % IN WATER 50 %
25 SYRINGE (ML) INTRAVENOUS ONCE
Qty: 0 | Refills: 0 | Status: DISCONTINUED | OUTPATIENT
Start: 2017-04-05 | End: 2017-04-07

## 2017-04-05 RX ORDER — DEXTROSE 50 % IN WATER 50 %
12.5 SYRINGE (ML) INTRAVENOUS ONCE
Qty: 0 | Refills: 0 | Status: DISCONTINUED | OUTPATIENT
Start: 2017-04-05 | End: 2017-04-07

## 2017-04-05 RX ORDER — INSULIN LISPRO 100/ML
VIAL (ML) SUBCUTANEOUS
Qty: 0 | Refills: 0 | Status: DISCONTINUED | OUTPATIENT
Start: 2017-04-05 | End: 2017-04-07

## 2017-04-05 RX ADMIN — Medication 1600 MILLIGRAM(S): at 22:15

## 2017-04-05 RX ADMIN — Medication 1 TABLET(S): at 18:27

## 2017-04-05 RX ADMIN — Medication 1 TABLET(S): at 22:15

## 2017-04-05 RX ADMIN — Medication 1600 MILLIGRAM(S): at 16:30

## 2017-04-05 RX ADMIN — Medication 1 TABLET(S): at 05:28

## 2017-04-05 RX ADMIN — Medication 40 MILLIGRAM(S): at 05:29

## 2017-04-05 RX ADMIN — Medication 1 TABLET(S): at 13:11

## 2017-04-05 RX ADMIN — Medication 40 MILLIGRAM(S): at 14:50

## 2017-04-05 RX ADMIN — PANTOPRAZOLE SODIUM 40 MILLIGRAM(S): 20 TABLET, DELAYED RELEASE ORAL at 05:28

## 2017-04-05 RX ADMIN — Medication 1600 MILLIGRAM(S): at 05:28

## 2017-04-05 RX ADMIN — Medication 40 MILLIGRAM(S): at 22:16

## 2017-04-05 NOTE — DISCHARGE NOTE ADULT - PLAN OF CARE
Resolution Please continuing taking your steroid taper for a total of a two week course. Please continue taking your mesalamine. If you have worsening diarrhea or blood in your stool, please call your gastroenterologist or go to the emergency room. You had anemia (low blood counts) due to your bleeding from your ulcerative colitis. If you have worsening blood in your stool or experience lightheadedness, dizziness, or feel faint, please call your gastroenterologist or go to the emergency room. Your kidney function was slightly decreased on admission (Cr 1.43), and it improved with fluid resuscitation. Please make a follow-up appointment with your primary care doctor to make sure your kidney function return to normal. Please continuing taking your prednisone and mesalamine to help prevent you from having a recurrence of an ulcerative colitis flare. Please make a follow-up appointment with your gastroenterologist within one to two weeks to discuss your ulcerative colitis. If you have worsening diarrhea or blood in your stool, please call your gastroenterologist or go to the emergency room. Your kidney function was slightly decreased on admission (Cr 1.43), and it improved with fluid resuscitation. Please make a follow-up appointment with your primary care doctor to make sure your kidney function continues to improve.

## 2017-04-05 NOTE — DISCHARGE NOTE ADULT - CONDITIONS AT DISCHARGE
Pt stable for discharge, note from work provided by MD, all d/c paperwork given and pt verbalized understanding, pt PIV removed- site w/out incident, awaiting family to unit, safety maintained.

## 2017-04-05 NOTE — DISCHARGE NOTE ADULT - CARE PROVIDER_API CALL
Rohith Franz), Gastroenterology; Internal Medicine  [address] [place] [address]  Phone: [phone]  Fax: [phone]

## 2017-04-05 NOTE — DISCHARGE NOTE ADULT - CARE PROVIDERS DIRECT ADDRESSES
,[email or web address],DirectAddress_Unknown

## 2017-04-05 NOTE — DISCHARGE NOTE ADULT - PATIENT PORTAL LINK FT
“You can access the Follow[facility] Patient Portal, offered by [facility], by registering with the following website: [email or web address]”

## 2017-04-05 NOTE — DISCHARGE NOTE ADULT - HOSPITAL COURSE
H&P:  36M PMH UC (diagnosed 2 years ago), hx of multiple flares 2/2 noncompliance with meds due to insurance issues, recent admission 12/30 - 1/4/17, p/w acute on chronic bloody diarrhea x 7 days. Reports 20 episodes of blood diarrhea per day, which has increased from bloody diarrhea in the past 3 weeks. Endorses associated intermittent diffuse abdominal "discomfort," nonradiating, rates it 5/10, no alleviating/exacerbating factors, self- resolves, typical of his UC flare. States he has been fired him his job and lost his insurance, currently has Medicaid. Due to canasa and asacol coverage during the interim, he has not been on any meds since about 3 weeks ago. Reports intermittent subjective fevers, but none recorded, denies chills. Pt came to ED today in the setting of head and nasal congestion x 2 days, and one episode of NBNB emesis today. Endorses poor PO intake for days. Denies sick contacts or recent travel. Of note, during recent admission for UC flare, stool cx grew positive for Aeromonas Hydrophila, he completed 5 days of cipro. Completed prednisone taper 4-5 weeks ago.     In ED: 97.9F -111 /78 - 118/86 RR 16-18 SpO2 100% RA  Received cipro 400, flagyl 500, 2L NS boluses, currently on LR @100cc/hr, zofran 4mg    Hospital Course:  # UC flare  - Patient was started on IV Solumedrol H&P:  36M PMH UC (diagnosed 2 years ago), hx of multiple flares 2/2 noncompliance with meds due to insurance issues, recent admission 12/30 - 1/4/17, p/w acute on chronic bloody diarrhea x 7 days. Reports 20 episodes of blood diarrhea per day, which has increased from bloody diarrhea in the past 3 weeks. Endorses associated intermittent diffuse abdominal "discomfort," nonradiating, rates it 5/10, no alleviating/exacerbating factors, self- resolves, typical of his UC flare. States he has been fired him his job and lost his insurance, currently has Medicaid. Due to canasa and asacol coverage during the interim, he has not been on any meds since about 3 weeks ago. Reports intermittent subjective fevers, but none recorded, denies chills. Pt came to ED today in the setting of head and nasal congestion x 2 days, and one episode of NBNB emesis today. Endorses poor PO intake for days. Denies sick contacts or recent travel. Of note, during recent admission for UC flare, stool cx grew positive for Aeromonas Hydrophila, he completed 5 days of cipro. Completed prednisone taper 4-5 weeks ago.     In ED: 97.9F -111 /78 - 118/86 RR 16-18 SpO2 100% RA  Received cipro 400, flagyl 500, 2L NS boluses, currently on LR @100cc/hr, zofran 4mg    Hospital Course:  # UC flare  - Patient was started on IV Solumedrol. Ciprofloxacin and Flagyl were continued for another day and then stopped as patient's health status improved. Patient was given mesalamine orally and rectally. Ova & parasites were negative x 3. Stool culture was negative. Blood cultures were negative. Patient's hematochezia and frequency of diarrhea decreased during his hospitalization. Patient was transitioned to prednisone taper on discharge.    # Anemia  - Patient's hemoglobin downtrended from 12.0 on admission to ~9. Patient did not require any blood transfusions. He was asymptomatic from the anemia.    # CORNELL  - Patient was started on LR fluid resuscitation. Patient's creatinine downtrended from 1.4 on admission to ~1.1. H&P:  36M PMH UC (diagnosed 2 years ago), hx of multiple flares 2/2 noncompliance with meds due to insurance issues, recent admission 12/30 - 1/4/17, p/w acute on chronic bloody diarrhea x 7 days. Reports 20 episodes of blood diarrhea per day, which has increased from bloody diarrhea in the past 3 weeks. Endorses associated intermittent diffuse abdominal "discomfort," nonradiating, rates it 5/10, no alleviating/exacerbating factors, self- resolves, typical of his UC flare. States he has been fired him his job and lost his insurance, currently has Medicaid. Due to canasa and asacol coverage during the interim, he has not been on any meds since about 3 weeks ago. Reports intermittent subjective fevers, but none recorded, denies chills. Pt came to ED today in the setting of head and nasal congestion x 2 days, and one episode of NBNB emesis today. Endorses poor PO intake for days. Denies sick contacts or recent travel. Of note, during recent admission for UC flare, stool cx grew positive for Aeromonas Hydrophila, he completed 5 days of cipro. Completed prednisone taper 4-5 weeks ago.     In ED: 97.9F -111 /78 - 118/86 RR 16-18 SpO2 100% RA  Received cipro 400, flagyl 500, 2L NS boluses, currently on LR @100cc/hr, zofran 4mg    Hospital Course:  # UC flare  - Patient was started on IV Solumedrol. Ciprofloxacin and Flagyl were continued for another day and then stopped as patient's health status improved. Patient was given mesalamine orally and rectally. Ova & parasites were negative x 3. Stool culture was negative. Blood cultures were negative. Patient's hematochezia and frequency of diarrhea decreased during his hospitalization. Patient was transitioned to prednisone taper on discharge. The need to continue on medications for his ulcerative colitis was stressed.    # Anemia  - Patient's hemoglobin downtrended from 12.0 on admission to ~9, which then stabilized. Patient did not require any blood transfusions. He was asymptomatic from the anemia.    # CORNELL  - Patient was started on LR fluid resuscitation. Patient's creatinine downtrended from 1.4 on admission to ~1.1. H&P:  36M PMH UC (diagnosed 2 years ago), hx of multiple flares 2/2 noncompliance with meds due to insurance issues, recent admission 12/30 - 1/4/17, p/w acute on chronic bloody diarrhea x 7 days. Reports 20 episodes of blood diarrhea per day, which has increased from bloody diarrhea in the past 3 weeks. Endorses associated intermittent diffuse abdominal "discomfort," nonradiating, rates it 5/10, no alleviating/exacerbating factors, self- resolves, typical of his UC flare. States he has been fired him his job and lost his insurance, currently has Medicaid. Due to canasa and asacol coverage during the interim, he has not been on any meds since about 3 weeks ago. Reports intermittent subjective fevers, but none recorded, denies chills. Pt came to ED today in the setting of head and nasal congestion x 2 days, and one episode of NBNB emesis today. Endorses poor PO intake for days. Denies sick contacts or recent travel. Of note, during recent admission for UC flare, stool cx grew positive for Aeromonas Hydrophila, he completed 5 days of cipro. Completed prednisone taper 4-5 weeks ago.     In ED: 97.9F -111 /78 - 118/86 RR 16-18 SpO2 100% RA  Received cipro 400, flagyl 500, 2L NS boluses, currently on LR @100cc/hr, zofran 4mg    Hospital Course:  # UC flare  - Patient was started on IV Solumedrol. Ciprofloxacin and Flagyl were continued for another day and then stopped as patient's health status improved. Patient was given mesalamine orally and rectally. Ova & parasites were negative x 3. Stool culture was negative. Blood cultures were negative. Patient's hematochezia and frequency of diarrhea decreased during his hospitalization. Patient was transitioned to prednisone taper on discharge. The need to continue on medications for his ulcerative colitis was stressed.    # Anemia  - Patient's hemoglobin downtrended from 12.0 on admission to ~9, which then stabilized. Patient did not require any blood transfusions. He was asymptomatic from the anemia.    # CORNELL  - Patient was started on LR fluid resuscitation. Patient's creatinine downtrended from 1.4 on admission to ~1.1.     Patient clinically stable for discharge home with outpatient GI follow up (Dr. Franz). Discharged home on protonix, iron tablets, mesalamine, and prednisone.

## 2017-04-05 NOTE — DISCHARGE NOTE ADULT - CARE PLAN
Principal Discharge DX:	Ulcerative colitis, acute  Goal:	Resolution  Instructions for follow-up, activity and diet:	Please continuing taking your steroid taper for a total of a two week course. Please continue taking your mesalamine. If you have worsening diarrhea or blood in your stool, please call your gastroenterologist or go to the emergency room.  Secondary Diagnosis:	Normocytic anemia due to blood loss  Instructions for follow-up, activity and diet:	You had anemia (low blood counts) due to your bleeding from your ulcerative colitis. If you have worsening blood in your stool or experience lightheadedness, dizziness, or feel faint, please call your gastroenterologist or go to the emergency room.  Secondary Diagnosis:	CORNELL (acute kidney injury)  Instructions for follow-up, activity and diet:	Your kidney function was slightly decreased on admission (Cr 1.43), and it improved with fluid resuscitation. Please make a follow-up appointment with your primary care doctor to make sure your kidney function return to normal. Principal Discharge DX:	Ulcerative colitis, acute  Goal:	Resolution  Instructions for follow-up, activity and diet:	Please continuing taking your prednisone and mesalamine to help prevent you from having a recurrence of an ulcerative colitis flare. Please make a follow-up appointment with your gastroenterologist within one to two weeks to discuss your ulcerative colitis. If you have worsening diarrhea or blood in your stool, please call your gastroenterologist or go to the emergency room.  Secondary Diagnosis:	Normocytic anemia due to blood loss  Instructions for follow-up, activity and diet:	You had anemia (low blood counts) due to your bleeding from your ulcerative colitis. If you have worsening blood in your stool or experience lightheadedness, dizziness, or feel faint, please call your gastroenterologist or go to the emergency room.  Secondary Diagnosis:	CORNELL (acute kidney injury)  Instructions for follow-up, activity and diet:	Your kidney function was slightly decreased on admission (Cr 1.43), and it improved with fluid resuscitation. Please make a follow-up appointment with your primary care doctor to make sure your kidney function continues to improve.

## 2017-04-05 NOTE — DISCHARGE NOTE ADULT - MEDICATION SUMMARY - MEDICATIONS TO TAKE
I will START or STAY ON the medications listed below when I get home from the hospital:    Asacol  mg oral delayed release tablet  -- 2 tab(s) by mouth 3 times a day  -- Do not chew, break, or crush.  Swallow whole.  Do not crush.    -- Indication: For Ulcerative colitis    predniSONE 20 mg oral tablet  -- 2 tab(s) by mouth once a day  -- Indication: For Ulcerative colitis    acetaminophen 325 mg oral tablet  -- 2 tab(s) by mouth every 6 hours, As needed, Moderate Pain (4 - 6)  -- Indication: For Ulcerative colitis    FeroSul 325 mg (65 mg elemental iron) oral tablet  -- 1 tab(s) by mouth once a day  -- Indication: For Iron deficiency    lactobacillus acidophilus oral capsule  -- 1 cap(s) by mouth once a day  -- Indication: For Prophylactic    pantoprazole 40 mg oral delayed release tablet  -- 1 tab(s) by mouth once a day (before a meal)  -- Indication: For Heartburn I will START or STAY ON the medications listed below when I get home from the hospital:    Asacol  mg oral delayed release tablet  -- 2 tab(s) by mouth 3 times a day  -- Do not chew, break, or crush.  Swallow whole.  Do not crush.    -- Indication: For Ulcerative colitis    predniSONE 20 mg oral tablet  -- 2 tab(s) by mouth once a day  -- Indication: For Ulcerative colitis    acetaminophen 325 mg oral tablet  -- 2 tab(s) by mouth every 6 hours, As needed, Moderate Pain (4 - 6)  -- Indication: For Ulcerative colitis    nystatin 100,000 units/g topical cream  -- 1 application on skin 2 times a day  -- Indication: For Dermatitis    FeroSul 325 mg (65 mg elemental iron) oral tablet  -- 1 tab(s) by mouth once a day  -- Indication: For Iron deficiency    lactobacillus acidophilus oral capsule  -- 1 cap(s) by mouth once a day  -- Indication: For Prophylactic    pantoprazole 40 mg oral delayed release tablet  -- 1 tab(s) by mouth once a day (before a meal)  -- Indication: For Heartburn

## 2017-04-06 LAB
BACTERIA STL CULT: SIGNIFICANT CHANGE UP
BASOPHILS # BLD AUTO: 0.02 K/UL — SIGNIFICANT CHANGE UP (ref 0–0.2)
BASOPHILS NFR BLD AUTO: 0.1 % — SIGNIFICANT CHANGE UP (ref 0–2)
BUN SERPL-MCNC: 14 MG/DL — SIGNIFICANT CHANGE UP (ref 7–23)
CALCIUM SERPL-MCNC: 8.6 MG/DL — SIGNIFICANT CHANGE UP (ref 8.4–10.5)
CHLORIDE SERPL-SCNC: 105 MMOL/L — SIGNIFICANT CHANGE UP (ref 98–107)
CHOLEST SERPL-MCNC: 147 MG/DL — SIGNIFICANT CHANGE UP (ref 120–199)
CO2 SERPL-SCNC: 22 MMOL/L — SIGNIFICANT CHANGE UP (ref 22–31)
CREAT SERPL-MCNC: 1.1 MG/DL — SIGNIFICANT CHANGE UP (ref 0.5–1.3)
CRP SERPL-MCNC: 4.7 MG/L — SIGNIFICANT CHANGE UP (ref 0.3–5)
EOSINOPHIL # BLD AUTO: 0 K/UL — SIGNIFICANT CHANGE UP (ref 0–0.5)
EOSINOPHIL NFR BLD AUTO: 0 % — SIGNIFICANT CHANGE UP (ref 0–6)
ERYTHROCYTE [SEDIMENTATION RATE] IN BLOOD: 28 MM/HR — HIGH (ref 1–15)
GLUCOSE SERPL-MCNC: 111 MG/DL — HIGH (ref 70–99)
HBA1C BLD-MCNC: 6.2 % — HIGH (ref 4–5.6)
HCT VFR BLD CALC: 29.5 % — LOW (ref 39–50)
HDLC SERPL-MCNC: 51 MG/DL — SIGNIFICANT CHANGE UP (ref 35–55)
HGB BLD-MCNC: 9.4 G/DL — LOW (ref 13–17)
IMM GRANULOCYTES NFR BLD AUTO: 1.2 % — SIGNIFICANT CHANGE UP (ref 0–1.5)
LIPID PNL WITH DIRECT LDL SERPL: 91 MG/DL — SIGNIFICANT CHANGE UP
LYMPHOCYTES # BLD AUTO: 12.1 % — LOW (ref 13–44)
LYMPHOCYTES # BLD AUTO: 2.88 K/UL — SIGNIFICANT CHANGE UP (ref 1–3.3)
MAGNESIUM SERPL-MCNC: 1.9 MG/DL — SIGNIFICANT CHANGE UP (ref 1.6–2.6)
MCHC RBC-ENTMCNC: 25.4 PG — LOW (ref 27–34)
MCHC RBC-ENTMCNC: 31.9 % — LOW (ref 32–36)
MCV RBC AUTO: 79.7 FL — LOW (ref 80–100)
MONOCYTES # BLD AUTO: 2.53 K/UL — HIGH (ref 0–0.9)
MONOCYTES NFR BLD AUTO: 10.6 % — SIGNIFICANT CHANGE UP (ref 2–14)
NEUTROPHILS # BLD AUTO: 18.15 K/UL — HIGH (ref 1.8–7.4)
NEUTROPHILS NFR BLD AUTO: 76 % — SIGNIFICANT CHANGE UP (ref 43–77)
O+P SPEC CONC: SIGNIFICANT CHANGE UP
PHOSPHATE SERPL-MCNC: 3.8 MG/DL — SIGNIFICANT CHANGE UP (ref 2.5–4.5)
PLATELET # BLD AUTO: 416 K/UL — HIGH (ref 150–400)
PMV BLD: 9.8 FL — SIGNIFICANT CHANGE UP (ref 7–13)
POTASSIUM SERPL-MCNC: 3.9 MMOL/L — SIGNIFICANT CHANGE UP (ref 3.5–5.3)
POTASSIUM SERPL-SCNC: 3.9 MMOL/L — SIGNIFICANT CHANGE UP (ref 3.5–5.3)
RBC # BLD: 3.7 M/UL — LOW (ref 4.2–5.8)
RBC # FLD: 16.4 % — HIGH (ref 10.3–14.5)
SODIUM SERPL-SCNC: 143 MMOL/L — SIGNIFICANT CHANGE UP (ref 135–145)
SPECIMEN SOURCE: SIGNIFICANT CHANGE UP
TRI STN SPEC: SIGNIFICANT CHANGE UP
TRIGL SERPL-MCNC: 58 MG/DL — SIGNIFICANT CHANGE UP (ref 10–149)
WBC # BLD: 23.86 K/UL — HIGH (ref 3.8–10.5)
WBC # FLD AUTO: 23.86 K/UL — HIGH (ref 3.8–10.5)

## 2017-04-06 PROCEDURE — 99233 SBSQ HOSP IP/OBS HIGH 50: CPT

## 2017-04-06 PROCEDURE — 99232 SBSQ HOSP IP/OBS MODERATE 35: CPT | Mod: GC

## 2017-04-06 RX ORDER — FERROUS SULFATE 325(65) MG
325 TABLET ORAL DAILY
Qty: 0 | Refills: 0 | Status: DISCONTINUED | OUTPATIENT
Start: 2017-04-06 | End: 2017-04-07

## 2017-04-06 RX ORDER — SODIUM CHLORIDE 9 MG/ML
1000 INJECTION, SOLUTION INTRAVENOUS
Qty: 0 | Refills: 0 | Status: DISCONTINUED | OUTPATIENT
Start: 2017-04-06 | End: 2017-04-07

## 2017-04-06 RX ORDER — NYSTATIN CREAM 100000 [USP'U]/G
1 CREAM TOPICAL
Qty: 0 | Refills: 0 | Status: DISCONTINUED | OUTPATIENT
Start: 2017-04-06 | End: 2017-04-07

## 2017-04-06 RX ADMIN — PANTOPRAZOLE SODIUM 40 MILLIGRAM(S): 20 TABLET, DELAYED RELEASE ORAL at 06:45

## 2017-04-06 RX ADMIN — Medication 40 MILLIGRAM(S): at 06:42

## 2017-04-06 RX ADMIN — Medication 325 MILLIGRAM(S): at 18:50

## 2017-04-06 RX ADMIN — Medication 1 TABLET(S): at 06:42

## 2017-04-06 RX ADMIN — SODIUM CHLORIDE 40 MILLILITER(S): 9 INJECTION, SOLUTION INTRAVENOUS at 14:42

## 2017-04-06 RX ADMIN — Medication 1600 MILLIGRAM(S): at 07:43

## 2017-04-06 RX ADMIN — SODIUM CHLORIDE 100 MILLILITER(S): 9 INJECTION, SOLUTION INTRAVENOUS at 02:18

## 2017-04-06 RX ADMIN — Medication 1 TABLET(S): at 18:50

## 2017-04-06 RX ADMIN — NYSTATIN CREAM 1 APPLICATION(S): 100000 CREAM TOPICAL at 20:01

## 2017-04-06 RX ADMIN — Medication 1600 MILLIGRAM(S): at 21:05

## 2017-04-06 RX ADMIN — Medication 1600 MILLIGRAM(S): at 14:37

## 2017-04-07 VITALS
HEART RATE: 100 BPM | DIASTOLIC BLOOD PRESSURE: 81 MMHG | RESPIRATION RATE: 18 BRPM | SYSTOLIC BLOOD PRESSURE: 118 MMHG | OXYGEN SATURATION: 100 % | TEMPERATURE: 98 F

## 2017-04-07 LAB
BACTERIA BLD CULT: SIGNIFICANT CHANGE UP
BACTERIA BLD CULT: SIGNIFICANT CHANGE UP
BUN SERPL-MCNC: 18 MG/DL — SIGNIFICANT CHANGE UP (ref 7–23)
CALCIUM SERPL-MCNC: 8.1 MG/DL — LOW (ref 8.4–10.5)
CHLORIDE SERPL-SCNC: 105 MMOL/L — SIGNIFICANT CHANGE UP (ref 98–107)
CO2 SERPL-SCNC: 23 MMOL/L — SIGNIFICANT CHANGE UP (ref 22–31)
CREAT SERPL-MCNC: 1.11 MG/DL — SIGNIFICANT CHANGE UP (ref 0.5–1.3)
GLUCOSE SERPL-MCNC: 88 MG/DL — SIGNIFICANT CHANGE UP (ref 70–99)
HCT VFR BLD CALC: 28.7 % — LOW (ref 39–50)
HGB BLD-MCNC: 9.2 G/DL — LOW (ref 13–17)
MAGNESIUM SERPL-MCNC: 1.9 MG/DL — SIGNIFICANT CHANGE UP (ref 1.6–2.6)
MCHC RBC-ENTMCNC: 25.7 PG — LOW (ref 27–34)
MCHC RBC-ENTMCNC: 32.1 % — SIGNIFICANT CHANGE UP (ref 32–36)
MCV RBC AUTO: 80.2 FL — SIGNIFICANT CHANGE UP (ref 80–100)
PHOSPHATE SERPL-MCNC: 3.5 MG/DL — SIGNIFICANT CHANGE UP (ref 2.5–4.5)
PLATELET # BLD AUTO: 383 K/UL — SIGNIFICANT CHANGE UP (ref 150–400)
PMV BLD: 9.6 FL — SIGNIFICANT CHANGE UP (ref 7–13)
POTASSIUM SERPL-MCNC: 3.8 MMOL/L — SIGNIFICANT CHANGE UP (ref 3.5–5.3)
POTASSIUM SERPL-SCNC: 3.8 MMOL/L — SIGNIFICANT CHANGE UP (ref 3.5–5.3)
RBC # BLD: 3.58 M/UL — LOW (ref 4.2–5.8)
RBC # FLD: 16.2 % — HIGH (ref 10.3–14.5)
SODIUM SERPL-SCNC: 141 MMOL/L — SIGNIFICANT CHANGE UP (ref 135–145)
WBC # BLD: 17.05 K/UL — HIGH (ref 3.8–10.5)
WBC # FLD AUTO: 17.05 K/UL — HIGH (ref 3.8–10.5)

## 2017-04-07 PROCEDURE — 99232 SBSQ HOSP IP/OBS MODERATE 35: CPT | Mod: GC

## 2017-04-07 PROCEDURE — 99239 HOSP IP/OBS DSCHRG MGMT >30: CPT

## 2017-04-07 RX ORDER — MESALAMINE 400 MG
2 TABLET, DELAYED RELEASE (ENTERIC COATED) ORAL
Qty: 180 | Refills: 2 | OUTPATIENT
Start: 2017-04-07 | End: 2017-07-05

## 2017-04-07 RX ORDER — FERROUS SULFATE 325(65) MG
1 TABLET ORAL
Qty: 30 | Refills: 1 | OUTPATIENT
Start: 2017-04-07 | End: 2017-06-05

## 2017-04-07 RX ORDER — ACETAMINOPHEN 500 MG
2 TABLET ORAL
Qty: 0 | Refills: 0 | COMMUNITY
Start: 2017-04-07

## 2017-04-07 RX ORDER — NYSTATIN CREAM 100000 [USP'U]/G
1 CREAM TOPICAL
Qty: 1 | Refills: 1 | OUTPATIENT
Start: 2017-04-07 | End: 2017-06-05

## 2017-04-07 RX ORDER — PANTOPRAZOLE SODIUM 20 MG/1
1 TABLET, DELAYED RELEASE ORAL
Qty: 30 | Refills: 1 | OUTPATIENT
Start: 2017-04-07 | End: 2017-06-05

## 2017-04-07 RX ORDER — LACTOBACILLUS ACIDOPHILUS 100MM CELL
1 CAPSULE ORAL
Qty: 30 | Refills: 1 | OUTPATIENT
Start: 2017-04-07 | End: 2017-06-05

## 2017-04-07 RX ORDER — MESALAMINE 400 MG
2 TABLET, DELAYED RELEASE (ENTERIC COATED) ORAL
Qty: 180 | Refills: 1 | OUTPATIENT
Start: 2017-04-07 | End: 2017-06-05

## 2017-04-07 RX ADMIN — Medication 1600 MILLIGRAM(S): at 14:55

## 2017-04-07 RX ADMIN — PANTOPRAZOLE SODIUM 40 MILLIGRAM(S): 20 TABLET, DELAYED RELEASE ORAL at 06:06

## 2017-04-07 RX ADMIN — Medication 40 MILLIGRAM(S): at 06:06

## 2017-04-07 RX ADMIN — Medication 325 MILLIGRAM(S): at 11:36

## 2017-04-07 RX ADMIN — Medication 1600 MILLIGRAM(S): at 08:01

## 2017-04-07 RX ADMIN — NYSTATIN CREAM 1 APPLICATION(S): 100000 CREAM TOPICAL at 06:07

## 2017-04-07 RX ADMIN — SODIUM CHLORIDE 40 MILLILITER(S): 9 INJECTION, SOLUTION INTRAVENOUS at 03:13

## 2017-04-07 RX ADMIN — Medication 1 TABLET(S): at 06:06

## 2017-04-18 ENCOUNTER — RX RENEWAL (OUTPATIENT)
Age: 37
End: 2017-04-18

## 2017-04-25 ENCOUNTER — APPOINTMENT (OUTPATIENT)
Dept: GASTROENTEROLOGY | Facility: CLINIC | Age: 37
End: 2017-04-25
Payer: MEDICAID

## 2017-04-25 VITALS
TEMPERATURE: 98 F | HEIGHT: 60 IN | WEIGHT: 167 LBS | HEART RATE: 108 BPM | SYSTOLIC BLOOD PRESSURE: 120 MMHG | BODY MASS INDEX: 32.79 KG/M2 | OXYGEN SATURATION: 99 % | DIASTOLIC BLOOD PRESSURE: 80 MMHG | RESPIRATION RATE: 14 BRPM

## 2017-04-25 PROCEDURE — 99214 OFFICE O/P EST MOD 30 MIN: CPT

## 2017-05-10 ENCOUNTER — MESSAGE (OUTPATIENT)
Age: 37
End: 2017-05-10

## 2017-05-31 ENCOUNTER — CHART COPY (OUTPATIENT)
Age: 37
End: 2017-05-31

## 2017-06-01 ENCOUNTER — CHART COPY (OUTPATIENT)
Age: 37
End: 2017-06-01

## 2017-06-05 ENCOUNTER — CHART COPY (OUTPATIENT)
Age: 37
End: 2017-06-05

## 2017-06-26 ENCOUNTER — CHART COPY (OUTPATIENT)
Age: 37
End: 2017-06-26

## 2017-09-18 ENCOUNTER — MESSAGE (OUTPATIENT)
Age: 37
End: 2017-09-18

## 2017-09-22 ENCOUNTER — APPOINTMENT (OUTPATIENT)
Dept: GASTROENTEROLOGY | Facility: CLINIC | Age: 37
End: 2017-09-22
Payer: MEDICAID

## 2017-09-22 VITALS
DIASTOLIC BLOOD PRESSURE: 80 MMHG | WEIGHT: 171 LBS | OXYGEN SATURATION: 98 % | TEMPERATURE: 98.8 F | HEART RATE: 111 BPM | SYSTOLIC BLOOD PRESSURE: 120 MMHG | BODY MASS INDEX: 33.4 KG/M2

## 2017-09-22 PROCEDURE — 99214 OFFICE O/P EST MOD 30 MIN: CPT

## 2017-09-22 RX ORDER — PREDNISONE 10 MG/1
10 TABLET ORAL
Qty: 120 | Refills: 0 | Status: DISCONTINUED | COMMUNITY
Start: 2017-04-18 | End: 2017-09-22

## 2017-10-04 ENCOUNTER — CHART COPY (OUTPATIENT)
Age: 37
End: 2017-10-04

## 2017-10-04 RX ORDER — ADALIMUMAB 40MG/0.8ML
40 KIT SUBCUTANEOUS
Qty: 6 | Refills: 0 | Status: DISCONTINUED | COMMUNITY
Start: 2017-09-22 | End: 2017-10-04

## 2017-10-13 ENCOUNTER — CHART COPY (OUTPATIENT)
Age: 37
End: 2017-10-13

## 2017-10-19 ENCOUNTER — CHART COPY (OUTPATIENT)
Age: 37
End: 2017-10-19

## 2017-10-30 RX ORDER — ADALIMUMAB 40MG/0.8ML
40 KIT SUBCUTANEOUS
Qty: 6 | Refills: 0 | Status: DISCONTINUED | COMMUNITY
Start: 2017-10-04 | End: 2017-10-30

## 2017-10-30 RX ORDER — INFLIXIMAB 100 MG/10ML
100 INJECTION, POWDER, LYOPHILIZED, FOR SOLUTION INTRAVENOUS
Qty: 4 | Refills: 2 | Status: DISCONTINUED | OUTPATIENT
Start: 2017-01-05 | End: 2017-10-30

## 2017-10-30 RX ORDER — ADALIMUMAB 40MG/0.8ML
40 KIT SUBCUTANEOUS
Qty: 6 | Refills: 0 | Status: DISCONTINUED | OUTPATIENT
Start: 2017-09-22 | End: 2017-10-30

## 2017-10-30 RX ORDER — ADALIMUMAB 40MG/0.8ML
40 KIT SUBCUTANEOUS
Qty: 2 | Refills: 6 | Status: DISCONTINUED | COMMUNITY
Start: 2017-10-30 | End: 2017-10-30

## 2017-10-31 ENCOUNTER — CHART COPY (OUTPATIENT)
Age: 37
End: 2017-10-31

## 2017-11-16 ENCOUNTER — MEDICATION RENEWAL (OUTPATIENT)
Age: 37
End: 2017-11-16

## 2017-11-30 ENCOUNTER — CHART COPY (OUTPATIENT)
Age: 37
End: 2017-11-30

## 2017-12-05 ENCOUNTER — APPOINTMENT (OUTPATIENT)
Dept: GASTROENTEROLOGY | Facility: CLINIC | Age: 37
End: 2017-12-05
Payer: MEDICAID

## 2017-12-05 VITALS
WEIGHT: 183 LBS | DIASTOLIC BLOOD PRESSURE: 80 MMHG | HEIGHT: 60 IN | RESPIRATION RATE: 16 BRPM | OXYGEN SATURATION: 98 % | SYSTOLIC BLOOD PRESSURE: 118 MMHG | BODY MASS INDEX: 35.93 KG/M2 | TEMPERATURE: 98 F | HEART RATE: 125 BPM

## 2017-12-05 PROCEDURE — 99214 OFFICE O/P EST MOD 30 MIN: CPT

## 2018-01-16 ENCOUNTER — APPOINTMENT (OUTPATIENT)
Dept: GASTROENTEROLOGY | Facility: CLINIC | Age: 38
End: 2018-01-16
Payer: MEDICAID

## 2018-01-16 VITALS
SYSTOLIC BLOOD PRESSURE: 130 MMHG | WEIGHT: 180 LBS | DIASTOLIC BLOOD PRESSURE: 75 MMHG | RESPIRATION RATE: 14 BRPM | BODY MASS INDEX: 35.34 KG/M2 | OXYGEN SATURATION: 99 % | TEMPERATURE: 98 F | HEIGHT: 60 IN | HEART RATE: 98 BPM

## 2018-01-16 DIAGNOSIS — M25.569 PAIN IN UNSPECIFIED KNEE: ICD-10-CM

## 2018-01-16 PROCEDURE — 99215 OFFICE O/P EST HI 40 MIN: CPT

## 2018-01-17 LAB
ALBUMIN SERPL ELPH-MCNC: 3.7 G/DL
ALP BLD-CCNC: 83 U/L
ALT SERPL-CCNC: 18 U/L
ANION GAP SERPL CALC-SCNC: 15 MMOL/L
AST SERPL-CCNC: 22 U/L
BASOPHILS # BLD AUTO: 0.04 K/UL
BASOPHILS NFR BLD AUTO: 0.5 %
BILIRUB SERPL-MCNC: 0.5 MG/DL
BUN SERPL-MCNC: 11 MG/DL
CALCIUM SERPL-MCNC: 8.7 MG/DL
CHLORIDE SERPL-SCNC: 104 MMOL/L
CO2 SERPL-SCNC: 21 MMOL/L
CREAT SERPL-MCNC: 1.3 MG/DL
CRP SERPL-MCNC: 2.5 MG/DL
EOSINOPHIL # BLD AUTO: 0.15 K/UL
EOSINOPHIL NFR BLD AUTO: 1.7 %
GLUCOSE SERPL-MCNC: 84 MG/DL
HCT VFR BLD CALC: 42.1 %
HGB BLD-MCNC: 13.6 G/DL
IMM GRANULOCYTES NFR BLD AUTO: 0.2 %
LYMPHOCYTES # BLD AUTO: 3.11 K/UL
LYMPHOCYTES NFR BLD AUTO: 35.5 %
MAN DIFF?: NORMAL
MCHC RBC-ENTMCNC: 25.7 PG
MCHC RBC-ENTMCNC: 32.3 GM/DL
MCV RBC AUTO: 79.4 FL
MONOCYTES # BLD AUTO: 1.06 K/UL
MONOCYTES NFR BLD AUTO: 12.1 %
NEUTROPHILS # BLD AUTO: 4.39 K/UL
NEUTROPHILS NFR BLD AUTO: 50 %
PLATELET # BLD AUTO: 261 K/UL
POTASSIUM SERPL-SCNC: 3.9 MMOL/L
PROT SERPL-MCNC: 7.7 G/DL
RBC # BLD: 5.3 M/UL
RBC # FLD: 16.8 %
SODIUM SERPL-SCNC: 140 MMOL/L
WBC # FLD AUTO: 8.77 K/UL

## 2018-01-18 ENCOUNTER — APPOINTMENT (OUTPATIENT)
Dept: GASTROENTEROLOGY | Facility: CLINIC | Age: 38
End: 2018-01-18

## 2018-01-22 LAB
ADALIMUMAB AB SERPL-MCNC: <25 NG/ML
ADALIMUMAB SERPL-MCNC: 5 UG/ML

## 2018-01-23 ENCOUNTER — CHART COPY (OUTPATIENT)
Age: 38
End: 2018-01-23

## 2018-04-08 NOTE — DISCHARGE NOTE ADULT - MEDICATION SUMMARY - MEDICATIONS TO STOP TAKING
Generalized weakness I will STOP taking the medications listed below when I get home from the hospital:  None

## 2018-04-10 ENCOUNTER — CHART COPY (OUTPATIENT)
Age: 38
End: 2018-04-10

## 2018-04-12 ENCOUNTER — APPOINTMENT (OUTPATIENT)
Dept: GASTROENTEROLOGY | Facility: CLINIC | Age: 38
End: 2018-04-12

## 2018-04-14 ENCOUNTER — INPATIENT (INPATIENT)
Facility: HOSPITAL | Age: 38
LOS: 3 days | Discharge: HOME | End: 2018-04-18
Attending: HOSPITALIST | Admitting: HOSPITALIST
Payer: MEDICAID

## 2018-04-14 VITALS
TEMPERATURE: 98 F | HEART RATE: 124 BPM | OXYGEN SATURATION: 100 % | SYSTOLIC BLOOD PRESSURE: 146 MMHG | RESPIRATION RATE: 16 BRPM | DIASTOLIC BLOOD PRESSURE: 97 MMHG

## 2018-04-14 DIAGNOSIS — K51.90 ULCERATIVE COLITIS, UNSPECIFIED, WITHOUT COMPLICATIONS: ICD-10-CM

## 2018-04-14 DIAGNOSIS — R05 COUGH: ICD-10-CM

## 2018-04-14 DIAGNOSIS — A41.9 SEPSIS, UNSPECIFIED ORGANISM: ICD-10-CM

## 2018-04-14 DIAGNOSIS — N17.9 ACUTE KIDNEY FAILURE, UNSPECIFIED: ICD-10-CM

## 2018-04-14 DIAGNOSIS — Z29.9 ENCOUNTER FOR PROPHYLACTIC MEASURES, UNSPECIFIED: ICD-10-CM

## 2018-04-14 DIAGNOSIS — R11.2 NAUSEA WITH VOMITING, UNSPECIFIED: ICD-10-CM

## 2018-04-14 DIAGNOSIS — D64.9 ANEMIA, UNSPECIFIED: ICD-10-CM

## 2018-04-14 LAB
ALBUMIN SERPL ELPH-MCNC: 3.3 G/DL — SIGNIFICANT CHANGE UP (ref 3.3–5)
ALP SERPL-CCNC: 78 U/L — SIGNIFICANT CHANGE UP (ref 40–120)
ALT FLD-CCNC: 12 U/L — SIGNIFICANT CHANGE UP (ref 4–41)
AST SERPL-CCNC: 28 U/L — SIGNIFICANT CHANGE UP (ref 4–40)
B PERT DNA SPEC QL NAA+PROBE: SIGNIFICANT CHANGE UP
BASE EXCESS BLDV CALC-SCNC: 2.1 MMOL/L — SIGNIFICANT CHANGE UP
BASE EXCESS BLDV CALC-SCNC: 6.7 MMOL/L — SIGNIFICANT CHANGE UP
BASOPHILS # BLD AUTO: 0.07 K/UL — SIGNIFICANT CHANGE UP (ref 0–0.2)
BASOPHILS NFR BLD AUTO: 0.5 % — SIGNIFICANT CHANGE UP (ref 0–2)
BILIRUB SERPL-MCNC: 0.5 MG/DL — SIGNIFICANT CHANGE UP (ref 0.2–1.2)
BLD GP AB SCN SERPL QL: NEGATIVE — SIGNIFICANT CHANGE UP
BLOOD GAS VENOUS - CREATININE: 1.53 MG/DL — HIGH (ref 0.5–1.3)
BLOOD GAS VENOUS - CREATININE: 1.59 MG/DL — HIGH (ref 0.5–1.3)
BUN SERPL-MCNC: 9 MG/DL — SIGNIFICANT CHANGE UP (ref 7–23)
C DIFF TOX GENS STL QL NAA+PROBE: SIGNIFICANT CHANGE UP
C PNEUM DNA SPEC QL NAA+PROBE: NOT DETECTED — SIGNIFICANT CHANGE UP
CALCIUM SERPL-MCNC: 8.7 MG/DL — SIGNIFICANT CHANGE UP (ref 8.4–10.5)
CHLORIDE BLDV-SCNC: 102 MMOL/L — SIGNIFICANT CHANGE UP (ref 96–108)
CHLORIDE BLDV-SCNC: 103 MMOL/L — SIGNIFICANT CHANGE UP (ref 96–108)
CHLORIDE SERPL-SCNC: 93 MMOL/L — LOW (ref 98–107)
CO2 SERPL-SCNC: 25 MMOL/L — SIGNIFICANT CHANGE UP (ref 22–31)
CREAT SERPL-MCNC: 1.57 MG/DL — HIGH (ref 0.5–1.3)
EOSINOPHIL # BLD AUTO: 0.5 K/UL — SIGNIFICANT CHANGE UP (ref 0–0.5)
EOSINOPHIL NFR BLD AUTO: 3.4 % — SIGNIFICANT CHANGE UP (ref 0–6)
ERYTHROCYTE [SEDIMENTATION RATE] IN BLOOD: 66 MM/HR — HIGH (ref 1–15)
FLUAV H1 2009 PAND RNA SPEC QL NAA+PROBE: NOT DETECTED — SIGNIFICANT CHANGE UP
FLUAV H1 RNA SPEC QL NAA+PROBE: NOT DETECTED — SIGNIFICANT CHANGE UP
FLUAV H3 RNA SPEC QL NAA+PROBE: NOT DETECTED — SIGNIFICANT CHANGE UP
FLUAV SUBTYP SPEC NAA+PROBE: SIGNIFICANT CHANGE UP
FLUBV RNA SPEC QL NAA+PROBE: NOT DETECTED — SIGNIFICANT CHANGE UP
GAS PNL BLDV: 131 MMOL/L — LOW (ref 136–146)
GAS PNL BLDV: 133 MMOL/L — LOW (ref 136–146)
GLUCOSE BLDV-MCNC: 89 — SIGNIFICANT CHANGE UP (ref 70–99)
GLUCOSE BLDV-MCNC: 95 — SIGNIFICANT CHANGE UP (ref 70–99)
GLUCOSE SERPL-MCNC: 87 MG/DL — SIGNIFICANT CHANGE UP (ref 70–99)
HADV DNA SPEC QL NAA+PROBE: NOT DETECTED — SIGNIFICANT CHANGE UP
HCO3 BLDV-SCNC: 25 MMOL/L — SIGNIFICANT CHANGE UP (ref 20–27)
HCO3 BLDV-SCNC: 28 MMOL/L — HIGH (ref 20–27)
HCOV 229E RNA SPEC QL NAA+PROBE: NOT DETECTED — SIGNIFICANT CHANGE UP
HCOV HKU1 RNA SPEC QL NAA+PROBE: NOT DETECTED — SIGNIFICANT CHANGE UP
HCOV NL63 RNA SPEC QL NAA+PROBE: NOT DETECTED — SIGNIFICANT CHANGE UP
HCOV OC43 RNA SPEC QL NAA+PROBE: NOT DETECTED — SIGNIFICANT CHANGE UP
HCT VFR BLD CALC: 43.4 % — SIGNIFICANT CHANGE UP (ref 39–50)
HCT VFR BLDV CALC: 37.7 % — LOW (ref 39–51)
HCT VFR BLDV CALC: 42.8 % — SIGNIFICANT CHANGE UP (ref 39–51)
HGB BLD-MCNC: 13.4 G/DL — SIGNIFICANT CHANGE UP (ref 13–17)
HGB BLDV-MCNC: 12.3 G/DL — LOW (ref 13–17)
HGB BLDV-MCNC: 13.9 G/DL — SIGNIFICANT CHANGE UP (ref 13–17)
HMPV RNA SPEC QL NAA+PROBE: NOT DETECTED — SIGNIFICANT CHANGE UP
HPIV1 RNA SPEC QL NAA+PROBE: NOT DETECTED — SIGNIFICANT CHANGE UP
HPIV2 RNA SPEC QL NAA+PROBE: NOT DETECTED — SIGNIFICANT CHANGE UP
HPIV3 RNA SPEC QL NAA+PROBE: NOT DETECTED — SIGNIFICANT CHANGE UP
HPIV4 RNA SPEC QL NAA+PROBE: NOT DETECTED — SIGNIFICANT CHANGE UP
IMM GRANULOCYTES # BLD AUTO: 0.05 # — SIGNIFICANT CHANGE UP
IMM GRANULOCYTES NFR BLD AUTO: 0.3 % — SIGNIFICANT CHANGE UP (ref 0–1.5)
LACTATE BLDV-MCNC: 1.3 MMOL/L — SIGNIFICANT CHANGE UP (ref 0.5–2)
LACTATE BLDV-MCNC: 3.2 MMOL/L — HIGH (ref 0.5–2)
LIDOCAIN IGE QN: 36.8 U/L — SIGNIFICANT CHANGE UP (ref 7–60)
LYMPHOCYTES # BLD AUTO: 41.6 % — SIGNIFICANT CHANGE UP (ref 13–44)
LYMPHOCYTES # BLD AUTO: 6.08 K/UL — HIGH (ref 1–3.3)
M PNEUMO DNA SPEC QL NAA+PROBE: NOT DETECTED — SIGNIFICANT CHANGE UP
MCHC RBC-ENTMCNC: 24.6 PG — LOW (ref 27–34)
MCHC RBC-ENTMCNC: 30.9 % — LOW (ref 32–36)
MCV RBC AUTO: 79.6 FL — LOW (ref 80–100)
MONOCYTES # BLD AUTO: 1.99 K/UL — HIGH (ref 0–0.9)
MONOCYTES NFR BLD AUTO: 13.6 % — SIGNIFICANT CHANGE UP (ref 2–14)
NEUTROPHILS # BLD AUTO: 5.92 K/UL — SIGNIFICANT CHANGE UP (ref 1.8–7.4)
NEUTROPHILS NFR BLD AUTO: 40.6 % — LOW (ref 43–77)
NRBC # FLD: 0 — SIGNIFICANT CHANGE UP
PCO2 BLDV: 46 MMHG — SIGNIFICANT CHANGE UP (ref 41–51)
PCO2 BLDV: 50 MMHG — SIGNIFICANT CHANGE UP (ref 41–51)
PH BLDV: 7.38 PH — SIGNIFICANT CHANGE UP (ref 7.32–7.43)
PH BLDV: 7.41 PH — SIGNIFICANT CHANGE UP (ref 7.32–7.43)
PLATELET # BLD AUTO: 449 K/UL — HIGH (ref 150–400)
PMV BLD: 10.3 FL — SIGNIFICANT CHANGE UP (ref 7–13)
PO2 BLDV: 29 MMHG — LOW (ref 35–40)
PO2 BLDV: < 24 MMHG — LOW (ref 35–40)
POTASSIUM BLDV-SCNC: 3.6 MMOL/L — SIGNIFICANT CHANGE UP (ref 3.4–4.5)
POTASSIUM BLDV-SCNC: 4.5 MMOL/L — SIGNIFICANT CHANGE UP (ref 3.4–4.5)
POTASSIUM SERPL-MCNC: 4.6 MMOL/L — SIGNIFICANT CHANGE UP (ref 3.5–5.3)
POTASSIUM SERPL-SCNC: 4.6 MMOL/L — SIGNIFICANT CHANGE UP (ref 3.5–5.3)
PROT SERPL-MCNC: 8.2 G/DL — SIGNIFICANT CHANGE UP (ref 6–8.3)
RBC # BLD: 5.45 M/UL — SIGNIFICANT CHANGE UP (ref 4.2–5.8)
RBC # FLD: 14.6 % — HIGH (ref 10.3–14.5)
RH IG SCN BLD-IMP: POSITIVE — SIGNIFICANT CHANGE UP
RSV RNA SPEC QL NAA+PROBE: NOT DETECTED — SIGNIFICANT CHANGE UP
RV+EV RNA SPEC QL NAA+PROBE: NOT DETECTED — SIGNIFICANT CHANGE UP
SAO2 % BLDV: 32.3 % — LOW (ref 60–85)
SAO2 % BLDV: 43.9 % — LOW (ref 60–85)
SODIUM SERPL-SCNC: 133 MMOL/L — LOW (ref 135–145)
WBC # BLD: 14.61 K/UL — HIGH (ref 3.8–10.5)
WBC # FLD AUTO: 14.61 K/UL — HIGH (ref 3.8–10.5)

## 2018-04-14 PROCEDURE — 71250 CT THORAX DX C-: CPT | Mod: 26

## 2018-04-14 PROCEDURE — 74177 CT ABD & PELVIS W/CONTRAST: CPT | Mod: 26

## 2018-04-14 PROCEDURE — 71046 X-RAY EXAM CHEST 2 VIEWS: CPT | Mod: 26

## 2018-04-14 PROCEDURE — 99223 1ST HOSP IP/OBS HIGH 75: CPT | Mod: GC

## 2018-04-14 RX ORDER — ONDANSETRON 8 MG/1
4 TABLET, FILM COATED ORAL ONCE
Qty: 0 | Refills: 0 | Status: COMPLETED | OUTPATIENT
Start: 2018-04-14 | End: 2018-04-17

## 2018-04-14 RX ORDER — MESALAMINE 400 MG
1200 TABLET, DELAYED RELEASE (ENTERIC COATED) ORAL AT BEDTIME
Qty: 0 | Refills: 0 | Status: DISCONTINUED | OUTPATIENT
Start: 2018-04-14 | End: 2018-04-14

## 2018-04-14 RX ORDER — METRONIDAZOLE 500 MG
500 TABLET ORAL EVERY 8 HOURS
Qty: 0 | Refills: 0 | Status: DISCONTINUED | OUTPATIENT
Start: 2018-04-14 | End: 2018-04-14

## 2018-04-14 RX ORDER — SODIUM CHLORIDE 9 MG/ML
1000 INJECTION INTRAMUSCULAR; INTRAVENOUS; SUBCUTANEOUS ONCE
Qty: 0 | Refills: 0 | Status: COMPLETED | OUTPATIENT
Start: 2018-04-14 | End: 2018-04-14

## 2018-04-14 RX ORDER — MESALAMINE 400 MG
4 TABLET, DELAYED RELEASE (ENTERIC COATED) ORAL AT BEDTIME
Qty: 0 | Refills: 0 | Status: DISCONTINUED | OUTPATIENT
Start: 2018-04-14 | End: 2018-04-15

## 2018-04-14 RX ORDER — ACETAMINOPHEN 500 MG
1000 TABLET ORAL ONCE
Qty: 0 | Refills: 0 | Status: COMPLETED | OUTPATIENT
Start: 2018-04-14 | End: 2018-04-14

## 2018-04-14 RX ORDER — CIPROFLOXACIN LACTATE 400MG/40ML
400 VIAL (ML) INTRAVENOUS ONCE
Qty: 0 | Refills: 0 | Status: COMPLETED | OUTPATIENT
Start: 2018-04-14 | End: 2018-04-14

## 2018-04-14 RX ORDER — METRONIDAZOLE 500 MG
500 TABLET ORAL ONCE
Qty: 0 | Refills: 0 | Status: COMPLETED | OUTPATIENT
Start: 2018-04-14 | End: 2018-04-14

## 2018-04-14 RX ORDER — SODIUM CHLORIDE 9 MG/ML
1000 INJECTION INTRAMUSCULAR; INTRAVENOUS; SUBCUTANEOUS
Qty: 0 | Refills: 0 | Status: DISCONTINUED | OUTPATIENT
Start: 2018-04-14 | End: 2018-04-15

## 2018-04-14 RX ORDER — PANTOPRAZOLE SODIUM 20 MG/1
40 TABLET, DELAYED RELEASE ORAL
Qty: 0 | Refills: 0 | Status: DISCONTINUED | OUTPATIENT
Start: 2018-04-14 | End: 2018-04-18

## 2018-04-14 RX ORDER — CIPROFLOXACIN LACTATE 400MG/40ML
400 VIAL (ML) INTRAVENOUS EVERY 12 HOURS
Qty: 0 | Refills: 0 | Status: DISCONTINUED | OUTPATIENT
Start: 2018-04-14 | End: 2018-04-14

## 2018-04-14 RX ORDER — ONDANSETRON 8 MG/1
4 TABLET, FILM COATED ORAL ONCE
Qty: 0 | Refills: 0 | Status: COMPLETED | OUTPATIENT
Start: 2018-04-14 | End: 2018-04-14

## 2018-04-14 RX ORDER — METOCLOPRAMIDE HCL 10 MG
10 TABLET ORAL ONCE
Qty: 0 | Refills: 0 | Status: COMPLETED | OUTPATIENT
Start: 2018-04-14 | End: 2018-04-14

## 2018-04-14 RX ORDER — LACTOBACILLUS ACIDOPHILUS 100MM CELL
1 CAPSULE ORAL AT BEDTIME
Qty: 0 | Refills: 0 | Status: DISCONTINUED | OUTPATIENT
Start: 2018-04-14 | End: 2018-04-18

## 2018-04-14 RX ORDER — MESALAMINE 400 MG
1200 TABLET, DELAYED RELEASE (ENTERIC COATED) ORAL EVERY 6 HOURS
Qty: 0 | Refills: 0 | Status: DISCONTINUED | OUTPATIENT
Start: 2018-04-14 | End: 2018-04-18

## 2018-04-14 RX ADMIN — Medication 200 MILLIGRAM(S): at 05:39

## 2018-04-14 RX ADMIN — Medication 1000 MILLIGRAM(S): at 03:30

## 2018-04-14 RX ADMIN — ONDANSETRON 4 MILLIGRAM(S): 8 TABLET, FILM COATED ORAL at 03:00

## 2018-04-14 RX ADMIN — SODIUM CHLORIDE 75 MILLILITER(S): 9 INJECTION INTRAMUSCULAR; INTRAVENOUS; SUBCUTANEOUS at 15:07

## 2018-04-14 RX ADMIN — Medication 1200 MILLIGRAM(S): at 23:47

## 2018-04-14 RX ADMIN — Medication 1 TABLET(S): at 21:26

## 2018-04-14 RX ADMIN — Medication 400 MILLIGRAM(S): at 03:00

## 2018-04-14 RX ADMIN — Medication 100 MILLIGRAM(S): at 04:45

## 2018-04-14 RX ADMIN — Medication 1200 MILLIGRAM(S): at 18:12

## 2018-04-14 RX ADMIN — SODIUM CHLORIDE 1000 MILLILITER(S): 9 INJECTION INTRAMUSCULAR; INTRAVENOUS; SUBCUTANEOUS at 03:00

## 2018-04-14 RX ADMIN — SODIUM CHLORIDE 75 MILLILITER(S): 9 INJECTION INTRAMUSCULAR; INTRAVENOUS; SUBCUTANEOUS at 21:26

## 2018-04-14 RX ADMIN — SODIUM CHLORIDE 500 MILLILITER(S): 9 INJECTION INTRAMUSCULAR; INTRAVENOUS; SUBCUTANEOUS at 04:37

## 2018-04-14 RX ADMIN — Medication 10 MILLIGRAM(S): at 06:11

## 2018-04-14 NOTE — ED PROVIDER NOTE - ATTENDING CONTRIBUTION TO CARE
DR. MORILLO, ATTENDING MD-  I performed a face to face bedside interview with patient regarding history of present illness, review of symptoms and past medical history. I completed an independent physical exam.  I have discussed patient's plan of care with the resident.   Documentation as above in the note.   HPI: 36 y/o M with PMH of ulcerative colitis p/w cough and diarrhea w/ abdominal pain. Pt. states symptoms began about 2 weeks prior. He states he has been taking his humira and has been having increasing bloody diarrhea along with abdominal pain. For last 2 days he has not been able to maintain PO intake. He states he has had several episodes on NBNB vomiting. He reports about 15 bloody BM with mucous. He states he has also had a cough for about 5 days w/o productive sputum. He denies fever, chills. He states he has some left sided pain when he coughs. denies dysuria or trauma to abdomen/ chest. GI dr. franz. Last UC flare 1 year ago.   EXAM: Wearing mask, NAD, lungs ctab, abd soft nontender.   MDM: concern for UC flare and URI with N/V, not tolerating PO. Will hydrate with IVF, obtain labs, and imaging and most likely needs admission and GI consult. Dr Franz is his GI MD.

## 2018-04-14 NOTE — H&P ADULT - PROBLEM SELECTOR PLAN 2
Meets sepsis criteria on admission. Likely 2/2 UC flare vs uncontrolled UC vs infectious colitis. RVP negative.  - c/w Cipro and Flagyl  - Check C diff, Stool culture and O+P  - f/u Blood cx and UA  - GI consult  - Clear liquid diet as tolerated for now

## 2018-04-14 NOTE — H&P ADULT - NSHPLABSRESULTS_GEN_ALL_CORE
Labs notable for WBC 14.61, Hb 13.4 (baseline 8 to 9), Platelets 449, Na 133, Cl 93, Creatinine 1.57 (baseline 1.1), Lipase 36.8, ESR 66. VBG showed 7.38/46/29/25, lactate 1.3. RVP negative. CXR showed clear lungs. CT AP w/ PO and IV contrast showed pancolitis w/ no drainable collection.

## 2018-04-14 NOTE — H&P ADULT - PROBLEM SELECTOR PLAN 5
Hb 13.4 (baseline 8 to 9) likely in setting of hemoconcentration with chronic anemia likely in setting of anemia of chronic disease and chronic hematochezia  - Monitor Hb, will likely decrease with appropriate IVF rescucitation  - Keep active Type and screen

## 2018-04-14 NOTE — H&P ADULT - ASSESSMENT
Patient is a 38 yo M w/ UC (dx 3 years PTA, currently on Humira; with history of multiple flare 2/2 medication noncompliance due to insurance issues) now presenting with abdominal pain, diarrhea and cough.

## 2018-04-14 NOTE — H&P ADULT - HISTORY OF PRESENT ILLNESS
Patient is a 36 yo M w/ UC (dx 3 years PTA, currently on Humira; with history of multiple flare 2/2 medication noncompliance due to insurance issues) now presenting with abdominal pain, diarrhea and cough. As per patient, he has been complaint with his Humira and has been having worsening bloody diarrhea and abdominal pain. Patient also endorses a productive cough x5 days.     On arrival to the ED, VS were T 97.7, HR 91, /81, RR 18, SO2 99% RA. Labs notable for WBC 14.61, Hb 13.4 (baseline 8 to 9), Platelets 449, Na 133, Cl 93, Creatinine 1.57 (baseline 1.1), Lipase 36.8, ESR 66. VBG showed 7.38/46/29/25, lactate 1.3. RVP negative. CXR showed clear lungs. CT AP w/ PO and IV contrast showed pancolitis w/ no drainable collection. In the ED, patient received NS 1L x2, Cipro x1, Flagyl x1, IV tylenol x1, Reglan x1, Zofran x1. Patient refused steroids in the ED. Patient is a 38 yo M w/ UC (dx 3 years PTA, currently on Humira; with history of multiple flare 2/2 medication noncompliance due to insurance issues) now presenting with abdominal pain, diarrhea and cough. As per patient, he has been complaint with his Humira (last 3 days PTA) which was increased from every 2 weeks to every week for the past month. As per patient, 2 weeks PTA, patient had worsening of his chronic, intermittent bloody diarrhea to 20 episodes daily with increased blood for several days, before improving to 8 to 10 times per day which is closer to his baseline since last admission for UC flare 1 year ago. However, patient still endorses bloodier stools than baseline. Patient also endorses a productive cough over the last 4 weeks but especially worsening over the last 5 days. Cough is productive of white sputum and is worsened by lying down. Coughing fits can become so bad than patient gets nauseous and vomits. Denies any current f/c, SOB, sick contacts, recent travel, history of positive TB screening, hemoptysis, dysphagia.     On arrival to the ED, VS were T 97.7, HR 91, /81, RR 18, SO2 99% RA. Labs notable for WBC 14.61, Hb 13.4 (baseline 8 to 9), Platelets 449, Na 133, Cl 93, Creatinine 1.57 (baseline 1.1), Lipase 36.8, ESR 66. VBG showed 7.38/46/29/25, lactate 1.3. RVP negative. CXR showed clear lungs. CT AP w/ PO and IV contrast showed pancolitis w/ no drainable collection. In the ED, patient received NS 1L x2, Cipro x1, Flagyl x1, IV tylenol x1, Reglan x1, Zofran x1. Patient refused steroids in the ED.

## 2018-04-14 NOTE — H&P ADULT - RS GEN PE MLT RESP DETAILS PC
normal/airway patent/breath sounds equal/respirations non-labored/good air movement/clear to auscultation bilaterally

## 2018-04-14 NOTE — H&P ADULT - PROBLEM SELECTOR PLAN 1
CT AP w/ PO and IV contrast showed pancolitis w/ no drainable collection. Endorses compliance with Humira (last 3 days PTA)  increased from every 2 weeks to every week for the past month. As per patient, 2 weeks PTA, patient had worsening of his chronic, intermittent bloody diarrhea to 20 episodes daily with increased blood for several days, before improving to 8 to 10 times per day which is closer to his baseline since last admission for UC flare 1 year ago. However, patient still endorses bloodier stools than baseline. UC flare vs uncontrolled UC vs infectious colitis.   - c/w Cipro and Flagyl  - Check C diff, Stool culture and O+P  - GI consult  - Clear liquid diet as tolerated for now CT AP w/ PO and IV contrast showed pancolitis w/ no drainable collection. Endorses compliance with Humira (last 3 days PTA)  increased from every 2 weeks to every week for the past month. As per patient, 2 weeks PTA, patient had worsening of his chronic, intermittent bloody diarrhea to 20 episodes daily with increased blood for several days, before improving to 8 to 10 times per day which is closer to his baseline since last admission for UC flare 1 year ago. However, patient still endorses bloodier stools than baseline. UC flare vs uncontrolled UC vs infectious colitis.   - c/w Cipro and Flagyl  - c/w home mesalamine and lactobacillus  - Patient currently refusing steroids, will hold off until GI eval given currently ruling out infection  - Check C diff, Stool culture and O+P  - GI consult  - Clear liquid diet as tolerated for now CT AP w/ PO and IV contrast showed pancolitis w/ no drainable collection. Endorses compliance with Humira (last 3 days PTA)  increased from every 2 weeks to every week for the past month. As per patient, 2 weeks PTA, patient had worsening of his chronic, intermittent bloody diarrhea to 20 episodes daily with increased blood for several days, before improving to 8 to 10 times per day which is closer to his baseline since last admission for UC flare 1 year ago. However, patient still endorses bloodier stools than baseline. UC flare vs uncontrolled UC vs infectious colitis.   - c/w Cipro and Flagyl  - c/w lactobacillus  - Will increase mesalamine to 4.8 gms in 4 divided doses daily  - Will give mesalamine 4 gms rectally  - Patient currently refusing steroids, will hold off until GI eval given currently ruling out infection  - Check C diff, Stool culture and O+P  - GI consult  - Clear liquid diet as tolerated for now

## 2018-04-14 NOTE — CONSULT NOTE ADULT - SUBJECTIVE AND OBJECTIVE BOX
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 Rohith Franz) who presents with cough and worsening diarrhea as well as nausea and vomiting. The patient came in because of several days of cough as well as L sided flank pain associated w the cough. He has also not been tolerating PO for several days w NBNB emesis on eating or drinking. For the past 2 weeks his BM have become more loose and with a higher quantity of blood, although the frequency, ~10 per day is stable. Endorses gaseous abdominal discomfort. No fevers or recent abx use and has been compliant w adalimumab last injection on Thurs which brought the frequency back to 10 as it had been ~20 for a few days. No recent sick contacts or travel. Last endoscopic procedure he believes was the sigmoidoscopy in 9/16 which showed inflammation and a pseudopolyp negative for dysplasia or CMV. He has not required steroids since being on Humira but has needed them in the past. He was increased to Qweekly dosing in 1/18 when he seemed to be having inadequate response. At that time anti-Humira antibodies were negative and trough level was 5 (therapeutic). Endorses subjective weight loss but cannot say how much. Has B/L knee soreness which is chronic.  In ED pt noted to have CORNELL, tachycardia, leukocytosis but clear lungs, negative RVP.  He was given IVF and cipro/flagyl.    Allergies:  No Known Allergies      Home Medications:    Hospital Medications:  ciprofloxacin   IVPB 400 milliGRAM(s) IV Intermittent every 12 hours  lactobacillus acidophilus 1 Tablet(s) Oral at bedtime  mesalamine DR Capsule 1200 milliGRAM(s) Oral every 6 hours  mesalamine Enema 4 Gram(s) Rectal at bedtime  metroNIDAZOLE  IVPB 500 milliGRAM(s) IV Intermittent every 8 hours  pantoprazole    Tablet 40 milliGRAM(s) Oral before breakfast  sodium chloride 0.9%. 1000 milliLiter(s) IV Continuous <Continuous>      PMHX/PSHX:  Ulcerative colitis without complications, unspecified location  No significant past surgical history      Family history: no IBD      Social History:   nonsmoker nondrinker  ROS:     General:  No wt loss, fevers, chills, night sweats, fatigue,   Eyes:  Good vision, no reported pain  ENT:  No sore throat, pain, runny nose, dysphagia  CV:  No pain, palpitations, hypo/hypertension  Resp:  No dyspnea, cough, tachypnea, wheezing  GI:  See HPI  :  No pain, bleeding, incontinence, nocturia  Muscle: B/L knee pain  Neuro:  No weakness, tingling, memory problems  Psych:  No fatigue, insomnia, mood problems, depression  Endocrine:  No polyuria, polydipsia, cold/heat intolerance  Heme:  No petechiae, ecchymosis, easy bruisability  Skin:  No rash, edema      PHYSICAL EXAM:     GENERAL:  Appears stated age, well-groomed, well-nourished, no distress, nontoxic appearing  HEENT:  NC/AT,  conjunctivae clear and pink,  no JVD  CHEST:  Full & symmetric excursion, no increased effort, breath sounds clear  HEART:  Regular rhythm, S1, S2, no murmur/rub/S3/S4, no abdominal bruit, no edema  ABDOMEN:  Soft, non-tender, non-distended, normoactive bowel sounds,  no masses , no hepatosplenomegaly  EXTREMITIES:  no cyanosis,clubbing or edema  SKIN:  No rash/erythema/ecchymoses/petechiae/wounds/abscess/warm/dry  NEURO:  Alert, oriented    Vital Signs:  Vital Signs Last 24 Hrs  T(C): 36.9 (14 Apr 2018 08:41), Max: 36.9 (14 Apr 2018 08:41)  T(F): 98.5 (14 Apr 2018 08:41), Max: 98.5 (14 Apr 2018 08:41)  HR: 87 (14 Apr 2018 08:41) (87 - 124)  BP: 120/85 (14 Apr 2018 08:41) (120/85 - 146/97)  BP(mean): --  RR: 18 (14 Apr 2018 08:41) (16 - 18)  SpO2: 100% (14 Apr 2018 08:41) (99% - 100%)  Daily     Daily     LABS:                        13.4   14.61 )-----------( 449      ( 14 Apr 2018 02:30 )             43.4     04-14    133<L>  |  93<L>  |  9   ----------------------------<  87  4.6   |  25  |  1.57<H>    Ca    8.7      14 Apr 2018 02:30    TPro  8.2  /  Alb  3.3  /  TBili  0.5  /  DBili  x   /  AST  28  /  ALT  12  /  AlkPhos  78  04-14    LIVER FUNCTIONS - ( 14 Apr 2018 02:30 )  Alb: 3.3 g/dL / Pro: 8.2 g/dL / ALK PHOS: 78 u/L / ALT: 12 u/L / AST: 28 u/L / GGT: x               Amylase Serum--      Lipase serum36.8       Ammonia--      Imaging:      CT:pancolonic thickening

## 2018-04-14 NOTE — H&P ADULT - ATTENDING COMMENTS
Patient seen and examined. Agree with above note by resident.    #UC flare -SIRS criteria present at admission. R/o underlying infection first. Check cdiff, stool cx, O+P. Consider steroids if infectious workup negative and patient is amenable. Supportive measures with IVF and antiemetics. GI evaluation appreciated. Possible flex sig early next week. Advance diet as tolerated. C/w mesalamine PO and rectally.     #Chronic cough - RVP negative. Given immunocompromised state, will check CT chest to further elucidate cause of cough.     #CORNELL - likely pre-renal. Gentle IVF and re-evaluate in AM    Plan discussed with patient and HS

## 2018-04-14 NOTE — H&P ADULT - PROBLEM SELECTOR PLAN 3
Creatinine 1.57 (baseline 1.1). Likely prerenal given poor PO intake and diarrhea  - Monitor Creatinine  - Avoid nephrotoxic agents

## 2018-04-14 NOTE — ED ADULT NURSE REASSESSMENT NOTE - NS ED NURSE REASSESS COMMENT FT1
pt. a&ox3, appears in NAD, still feels nauseated, pain slightly lessened. MD Fermin made aware. awaits dispo.

## 2018-04-14 NOTE — ED PROVIDER NOTE - MEDICAL DECISION MAKING DETAILS
38 y/o M with PMH of ulcerative colitis p/w cough and diarrhea w/ abdominal pain. r/o UC flare vs obstruction. ct abdomen pelvis w/ contrast. crp, esr, cxr, rvp, cmp, cbc, type and screen.

## 2018-04-14 NOTE — ED ADULT NURSE NOTE - OBJECTIVE STATEMENT
Mary RN:  The patient is a 39 y/o male a&ox4 with a PMH of ulcerative colitis p/w a c/c of bloody diarreah, approx 20 episodes yesterday, and vomiting.  The patient denies blood or bile in vomit, denies abd pain.  Patient also endorses cough x1 week.  Denies fevers/chills, SOB, CP.  VSS, no IV placed by this author.

## 2018-04-14 NOTE — ED ADULT NURSE NOTE - CHIEF COMPLAINT QUOTE
Pt st"I have ulcerative colitis started acting up had apprx 20 episodes of diarreah yesterday, also I started vomiting since yesterday. Today I had bloody stools " Pt dry heaving in triage appears very uncomfortable.

## 2018-04-14 NOTE — ED PROVIDER NOTE - OBJECTIVE STATEMENT
38 y/o M with PMH of ulcerative colitis p/w cough and diarrhea w/ abdominal pain. Pt. states symptoms began about 2 weeks prior. He states he has been taking his humira and has been having increasing bloody diarrhea along with abdominal pain. For last 2 days he has not been able to maintain PO intake. He states he has had several episodes on NBNB vomiting. He reports about 15 bloody BM with mucous. He states he has also had a cough for about 5 days w/o productive sputum. He denies fever, chills. He states he has some left sided pain when he coughs. denies dysuria or trauma to abdomen/ chest. GI dr. miles

## 2018-04-14 NOTE — H&P ADULT - PROBLEM SELECTOR PLAN 4
CXR normal. Endorses worsening when lying down with occasional symptoms of heartburn. Endorses negative prior TB screening.   - CT chest given immunosuppressed state to r/o atypical infection  - Trial of daily protonix for possible GERD related cough

## 2018-04-14 NOTE — ED PROVIDER NOTE - CARE PLAN
Principal Discharge DX:	Nausea & vomiting  Secondary Diagnosis:	Ulcerative colitis without complications, unspecified location

## 2018-04-15 LAB
APPEARANCE UR: CLEAR — SIGNIFICANT CHANGE UP
BASOPHILS # BLD AUTO: 0.07 K/UL — SIGNIFICANT CHANGE UP (ref 0–0.2)
BASOPHILS NFR BLD AUTO: 0.6 % — SIGNIFICANT CHANGE UP (ref 0–2)
BILIRUB UR-MCNC: NEGATIVE — SIGNIFICANT CHANGE UP
BLD GP AB SCN SERPL QL: NEGATIVE — SIGNIFICANT CHANGE UP
BLOOD UR QL VISUAL: NEGATIVE — SIGNIFICANT CHANGE UP
BUN SERPL-MCNC: 5 MG/DL — LOW (ref 7–23)
CALCIUM SERPL-MCNC: 7.9 MG/DL — LOW (ref 8.4–10.5)
CHLORIDE SERPL-SCNC: 105 MMOL/L — SIGNIFICANT CHANGE UP (ref 98–107)
CHLORIDE UR-SCNC: 62 MMOL/L — SIGNIFICANT CHANGE UP
CO2 SERPL-SCNC: 24 MMOL/L — SIGNIFICANT CHANGE UP (ref 22–31)
COLOR SPEC: SIGNIFICANT CHANGE UP
CREAT SERPL-MCNC: 1.51 MG/DL — HIGH (ref 0.5–1.3)
CRP SERPL-MCNC: 20.6 MG/L — HIGH
EOSINOPHIL # BLD AUTO: 0.71 K/UL — HIGH (ref 0–0.5)
EOSINOPHIL NFR BLD AUTO: 6 % — SIGNIFICANT CHANGE UP (ref 0–6)
GLUCOSE SERPL-MCNC: 87 MG/DL — SIGNIFICANT CHANGE UP (ref 70–99)
GLUCOSE UR-MCNC: NEGATIVE — SIGNIFICANT CHANGE UP
HCT VFR BLD CALC: 37.3 % — LOW (ref 39–50)
HGB BLD-MCNC: 11.3 G/DL — LOW (ref 13–17)
IMM GRANULOCYTES # BLD AUTO: 0.03 # — SIGNIFICANT CHANGE UP
IMM GRANULOCYTES NFR BLD AUTO: 0.3 % — SIGNIFICANT CHANGE UP (ref 0–1.5)
KETONES UR-MCNC: SIGNIFICANT CHANGE UP
LEUKOCYTE ESTERASE UR-ACNC: NEGATIVE — SIGNIFICANT CHANGE UP
LYMPHOCYTES # BLD AUTO: 4.87 K/UL — HIGH (ref 1–3.3)
LYMPHOCYTES # BLD AUTO: 41.1 % — SIGNIFICANT CHANGE UP (ref 13–44)
MAGNESIUM SERPL-MCNC: 2.2 MG/DL — SIGNIFICANT CHANGE UP (ref 1.6–2.6)
MCHC RBC-ENTMCNC: 24.7 PG — LOW (ref 27–34)
MCHC RBC-ENTMCNC: 30.3 % — LOW (ref 32–36)
MCV RBC AUTO: 81.4 FL — SIGNIFICANT CHANGE UP (ref 80–100)
MONOCYTES # BLD AUTO: 1.29 K/UL — HIGH (ref 0–0.9)
MONOCYTES NFR BLD AUTO: 10.9 % — SIGNIFICANT CHANGE UP (ref 2–14)
MUCOUS THREADS # UR AUTO: SIGNIFICANT CHANGE UP
NEUTROPHILS # BLD AUTO: 4.89 K/UL — SIGNIFICANT CHANGE UP (ref 1.8–7.4)
NEUTROPHILS NFR BLD AUTO: 41.1 % — LOW (ref 43–77)
NITRITE UR-MCNC: NEGATIVE — SIGNIFICANT CHANGE UP
NRBC # FLD: 0 — SIGNIFICANT CHANGE UP
OB PNL STL: NEGATIVE — SIGNIFICANT CHANGE UP
PH UR: 7 — SIGNIFICANT CHANGE UP (ref 4.6–8)
PHOSPHATE SERPL-MCNC: 3.7 MG/DL — SIGNIFICANT CHANGE UP (ref 2.5–4.5)
PLATELET # BLD AUTO: 349 K/UL — SIGNIFICANT CHANGE UP (ref 150–400)
PMV BLD: 11.1 FL — SIGNIFICANT CHANGE UP (ref 7–13)
POTASSIUM SERPL-MCNC: 3.9 MMOL/L — SIGNIFICANT CHANGE UP (ref 3.5–5.3)
POTASSIUM SERPL-SCNC: 3.9 MMOL/L — SIGNIFICANT CHANGE UP (ref 3.5–5.3)
POTASSIUM UR-SCNC: 31 MMOL/L — SIGNIFICANT CHANGE UP
PROT UR-MCNC: NEGATIVE MG/DL — SIGNIFICANT CHANGE UP
RBC # BLD: 4.58 M/UL — SIGNIFICANT CHANGE UP (ref 4.2–5.8)
RBC # FLD: 14.8 % — HIGH (ref 10.3–14.5)
RBC CASTS # UR COMP ASSIST: SIGNIFICANT CHANGE UP (ref 0–?)
RH IG SCN BLD-IMP: POSITIVE — SIGNIFICANT CHANGE UP
SODIUM SERPL-SCNC: 142 MMOL/L — SIGNIFICANT CHANGE UP (ref 135–145)
SODIUM UR-SCNC: 57 MMOL/L — SIGNIFICANT CHANGE UP
SP GR SPEC: 1.01 — SIGNIFICANT CHANGE UP (ref 1–1.04)
SPECIMEN SOURCE: SIGNIFICANT CHANGE UP
SPECIMEN SOURCE: SIGNIFICANT CHANGE UP
SQUAMOUS # UR AUTO: SIGNIFICANT CHANGE UP
UROBILINOGEN FLD QL: NORMAL MG/DL — SIGNIFICANT CHANGE UP
WBC # BLD: 11.86 K/UL — HIGH (ref 3.8–10.5)
WBC # FLD AUTO: 11.86 K/UL — HIGH (ref 3.8–10.5)
WBC UR QL: SIGNIFICANT CHANGE UP (ref 0–?)

## 2018-04-15 PROCEDURE — 99233 SBSQ HOSP IP/OBS HIGH 50: CPT

## 2018-04-15 PROCEDURE — 99222 1ST HOSP IP/OBS MODERATE 55: CPT | Mod: GC

## 2018-04-15 RX ORDER — MESALAMINE 400 MG
1000 TABLET, DELAYED RELEASE (ENTERIC COATED) ORAL AT BEDTIME
Qty: 0 | Refills: 0 | Status: DISCONTINUED | OUTPATIENT
Start: 2018-04-15 | End: 2018-04-18

## 2018-04-15 RX ORDER — SODIUM CHLORIDE 9 MG/ML
1000 INJECTION INTRAMUSCULAR; INTRAVENOUS; SUBCUTANEOUS
Qty: 0 | Refills: 0 | Status: DISCONTINUED | OUTPATIENT
Start: 2018-04-15 | End: 2018-04-16

## 2018-04-15 RX ORDER — ACETAMINOPHEN 500 MG
650 TABLET ORAL ONCE
Qty: 0 | Refills: 0 | Status: COMPLETED | OUTPATIENT
Start: 2018-04-15 | End: 2018-04-15

## 2018-04-15 RX ADMIN — Medication 650 MILLIGRAM(S): at 05:01

## 2018-04-15 RX ADMIN — Medication 1200 MILLIGRAM(S): at 23:25

## 2018-04-15 RX ADMIN — Medication 1200 MILLIGRAM(S): at 12:03

## 2018-04-15 RX ADMIN — SODIUM CHLORIDE 75 MILLILITER(S): 9 INJECTION INTRAMUSCULAR; INTRAVENOUS; SUBCUTANEOUS at 15:34

## 2018-04-15 RX ADMIN — Medication 1200 MILLIGRAM(S): at 18:14

## 2018-04-15 RX ADMIN — Medication 1000 MILLIGRAM(S): at 23:58

## 2018-04-15 RX ADMIN — PANTOPRAZOLE SODIUM 40 MILLIGRAM(S): 20 TABLET, DELAYED RELEASE ORAL at 05:01

## 2018-04-15 RX ADMIN — Medication 1200 MILLIGRAM(S): at 05:01

## 2018-04-15 RX ADMIN — Medication 1 TABLET(S): at 23:25

## 2018-04-15 RX ADMIN — Medication 100 MILLIGRAM(S): at 23:25

## 2018-04-15 RX ADMIN — Medication 100 MILLIGRAM(S): at 13:33

## 2018-04-15 NOTE — PROGRESS NOTE ADULT - PROBLEM SELECTOR PLAN 4
Worsening anemia in setting of rectal bleeding, patient with occult blood negative, however there is morelia blood when he goes to bathroom and it is known he is having acute rectal bleeding this visit  - Continue to monitor CBC daily, no indication for pRBC transfusion currently  - Keep active Type and screen

## 2018-04-15 NOTE — PROGRESS NOTE ADULT - PROBLEM SELECTOR PLAN 1
CT AP w/ PO and IV contrast showed pancolitis consistent with UC flare and resulting colitis  -gastroenterology recommendations appreciated, patient is NPO p MN for planned sigmoidoscopy tomorrow, will have enema ~ 1 hour prior to procedure but there is no set time for procedure tomorrow so RN will be called tomorrow when time is confirmed about when patient should have enema  - c/w lactobacillus  - Will increase mesalamine to 4.8 gms in 4 divided doses daily  - Canesa suppository QHS per GI  - C diff negative, check Stool culture and O+P currently off antibiotics

## 2018-04-15 NOTE — PROGRESS NOTE ADULT - PROBLEM SELECTOR PLAN 2
Creatinine 1.57 (baseline 1.1). Likely prerenal given poor PO intake and diarrhea  - Monitor Creatinine on gentle hydration, Creatinine is downtrending to 1.51 today  -check urine elctrolytes

## 2018-04-15 NOTE — PROGRESS NOTE ADULT - PROBLEM SELECTOR PLAN 3
CXR normal. RVP negative  - CT chest given immunosuppressed state without acute infection  - Trial of daily protonix for possible GERD related cough  -Tessalon Perle PRN

## 2018-04-15 NOTE — PROGRESS NOTE ADULT - ASSESSMENT
Patient is a 36 yo M w/ UC (dx 3 years PTA, currently on Humira; with history of multiple flare 2/2 medication noncompliance due to insurance issues) now presenting with abdominal pain, diarrhea and cough.

## 2018-04-15 NOTE — PROGRESS NOTE ADULT - SUBJECTIVE AND OBJECTIVE BOX
Chief Complaint: Patient is a 37y old  Male who presents with a chief complaint of Cough and Diarrhea (2018 11:29)    INTERVAL HPI/OVERNIGHT EVENTS:   Patient states he is continuing to have bloody bowel movement occasionally and that his cough is continuing, but overall feels stable today as compared to yesterday.      MEDICATIONS  (STANDING):  lactobacillus acidophilus 1 Tablet(s) Oral at bedtime  mesalamine DR Capsule 1200 milliGRAM(s) Oral every 6 hours  mesalamine Enema 4 Gram(s) Rectal at bedtime  pantoprazole    Tablet 40 milliGRAM(s) Oral before breakfast  sodium chloride 0.9%. 1000 milliLiter(s) (75 mL/Hr) IV Continuous <Continuous>    MEDICATIONS  (PRN):  benzonatate 100 milliGRAM(s) Oral every 8 hours PRN Cough  ondansetron Injectable 4 milliGRAM(s) IV Push once PRN Nausea and/or Vomiting    Vital Signs Last 24 Hrs  T(C): 36.5 (15 Apr 2018 12:58), Max: 37.3 (15 Apr 2018 04:53)  T(F): 97.7 (15 Apr 2018 12:58), Max: 99.1 (15 Apr 2018 04:53)  HR: 79 (15 Apr 2018 12:58) (79 - 99)  BP: 113/80 (15 Apr 2018 12:58) (108/78 - 119/80)  BP(mean): --  RR: 17 (15 Apr 2018 12:58) (16 - 17)  SpO2: 100% (15 Apr 2018 12:58) (100% - 100%)    I&O's Detail  CAPILLARY BLOOD GLUCOSE    PHYSICAL EXAM:  GENERAL: NAD  Pulm: CTA b/l without R/W/R  CV: S1&S2+, RRR without R/M/G  ABDOMEN: bs+, soft, nt, nd   EXTREMITIES:  2+ peripheral pulses, no appreciable edema in b/l LE  Neuro: Awake, alert and oriented x4    LABS:                        11.3   11.86 )-----------( 349      ( 15 Apr 2018 04:36 )             37.3     04-15    142  |  105  |  5<L>  ----------------------------<  87  3.9   |  24  |  1.51<H>    Ca    7.9<L>      15 Apr 2018 04:36  Phos  3.7     04-15  Mg     2.2     04-15    TPro  8.2  /  Alb  3.3  /  TBili  0.5  /  DBili  x   /  AST  28  /  ALT  12  /  AlkPhos  78  04-14    LIVER FUNCTIONS - ( 2018 02:30 )  Alb: 3.3 g/dL / Pro: 8.2 g/dL / ALK PHOS: 78 u/L / ALT: 12 u/L / AST: 28 u/L / GGT: x     / T. Bili 0.5 mg/dL / D. Bili x         Urinalysis Basic - ( 15 Apr 2018 01:47 )    Color: COLORL / Appearance: CLEAR / S.007 / pH: 7.0  Gluc: NEGATIVE / Ketone: TRACE  / Bili: NEGATIVE / Urobili: NORMAL mg/dL   Blood: NEGATIVE / Protein: NEGATIVE mg/dL / Nitrite: NEGATIVE   Leuk Esterase: NEGATIVE / RBC: 0-2 / WBC 2-5   Sq Epi: OCC / Non Sq Epi: x / Bacteria: x    Urinalysis Basic - ( 15 Apr 2018 01:47 )    Color: COLORL / Appearance: CLEAR / S.007 / pH: 7.0  Gluc: NEGATIVE / Ketone: TRACE  / Bili: NEGATIVE / Urobili: NORMAL mg/dL   Blood: NEGATIVE / Protein: NEGATIVE mg/dL / Nitrite: NEGATIVE   Leuk Esterase: NEGATIVE / RBC: 0-2 / WBC 2-5   Sq Epi: OCC / Non Sq Epi: x / Bacteria: x    Microbiology:    RADIOLOGY & ADDITIONAL TESTS:    Imaging Personally Reviewed:     Consultant(s) Notes Reviewed:      Care Discussed with Consultants/Other Providers   Gastroenterology fellow Dr. Elizalde and gastroenterology attending Dr. Jean

## 2018-04-16 ENCOUNTER — RESULT REVIEW (OUTPATIENT)
Age: 38
End: 2018-04-16

## 2018-04-16 LAB
ALBUMIN SERPL ELPH-MCNC: 2.9 G/DL — LOW (ref 3.3–5)
ALP SERPL-CCNC: 60 U/L — SIGNIFICANT CHANGE UP (ref 40–120)
ALT FLD-CCNC: 8 U/L — SIGNIFICANT CHANGE UP (ref 4–41)
AST SERPL-CCNC: 12 U/L — SIGNIFICANT CHANGE UP (ref 4–40)
BASOPHILS # BLD AUTO: 0.09 K/UL — SIGNIFICANT CHANGE UP (ref 0–0.2)
BASOPHILS NFR BLD AUTO: 0.6 % — SIGNIFICANT CHANGE UP (ref 0–2)
BILIRUB SERPL-MCNC: 0.3 MG/DL — SIGNIFICANT CHANGE UP (ref 0.2–1.2)
BUN SERPL-MCNC: 5 MG/DL — LOW (ref 7–23)
BUN SERPL-MCNC: 5 MG/DL — LOW (ref 7–23)
CALCIUM SERPL-MCNC: 7.9 MG/DL — LOW (ref 8.4–10.5)
CALCIUM SERPL-MCNC: 7.9 MG/DL — LOW (ref 8.4–10.5)
CHLORIDE SERPL-SCNC: 105 MMOL/L — SIGNIFICANT CHANGE UP (ref 98–107)
CHLORIDE SERPL-SCNC: 105 MMOL/L — SIGNIFICANT CHANGE UP (ref 98–107)
CO2 SERPL-SCNC: 21 MMOL/L — LOW (ref 22–31)
CO2 SERPL-SCNC: 21 MMOL/L — LOW (ref 22–31)
CREAT SERPL-MCNC: 1.42 MG/DL — HIGH (ref 0.5–1.3)
CREAT SERPL-MCNC: 1.42 MG/DL — HIGH (ref 0.5–1.3)
EOSINOPHIL # BLD AUTO: 1.15 K/UL — HIGH (ref 0–0.5)
EOSINOPHIL NFR BLD AUTO: 7.7 % — HIGH (ref 0–6)
GI PCR PANEL, STOOL: SIGNIFICANT CHANGE UP
GLUCOSE SERPL-MCNC: 100 MG/DL — HIGH (ref 70–99)
GLUCOSE SERPL-MCNC: 100 MG/DL — HIGH (ref 70–99)
HCT VFR BLD CALC: 36.2 % — LOW (ref 39–50)
HGB BLD-MCNC: 11.2 G/DL — LOW (ref 13–17)
IMM GRANULOCYTES # BLD AUTO: 0.03 # — SIGNIFICANT CHANGE UP
IMM GRANULOCYTES NFR BLD AUTO: 0.2 % — SIGNIFICANT CHANGE UP (ref 0–1.5)
LYMPHOCYTES # BLD AUTO: 41.9 % — SIGNIFICANT CHANGE UP (ref 13–44)
LYMPHOCYTES # BLD AUTO: 6.22 K/UL — HIGH (ref 1–3.3)
MAGNESIUM SERPL-MCNC: 1.9 MG/DL — SIGNIFICANT CHANGE UP (ref 1.6–2.6)
MCHC RBC-ENTMCNC: 24.6 PG — LOW (ref 27–34)
MCHC RBC-ENTMCNC: 30.9 % — LOW (ref 32–36)
MCV RBC AUTO: 79.6 FL — LOW (ref 80–100)
MONOCYTES # BLD AUTO: 1.38 K/UL — HIGH (ref 0–0.9)
MONOCYTES NFR BLD AUTO: 9.3 % — SIGNIFICANT CHANGE UP (ref 2–14)
NEUTROPHILS # BLD AUTO: 5.99 K/UL — SIGNIFICANT CHANGE UP (ref 1.8–7.4)
NEUTROPHILS NFR BLD AUTO: 40.3 % — LOW (ref 43–77)
NRBC # FLD: 0 — SIGNIFICANT CHANGE UP
PHOSPHATE SERPL-MCNC: 3.1 MG/DL — SIGNIFICANT CHANGE UP (ref 2.5–4.5)
PLATELET # BLD AUTO: 377 K/UL — SIGNIFICANT CHANGE UP (ref 150–400)
PMV BLD: 10.3 FL — SIGNIFICANT CHANGE UP (ref 7–13)
POTASSIUM SERPL-MCNC: 3.8 MMOL/L — SIGNIFICANT CHANGE UP (ref 3.5–5.3)
POTASSIUM SERPL-MCNC: 3.8 MMOL/L — SIGNIFICANT CHANGE UP (ref 3.5–5.3)
POTASSIUM SERPL-SCNC: 3.8 MMOL/L — SIGNIFICANT CHANGE UP (ref 3.5–5.3)
POTASSIUM SERPL-SCNC: 3.8 MMOL/L — SIGNIFICANT CHANGE UP (ref 3.5–5.3)
PROT SERPL-MCNC: 6.6 G/DL — SIGNIFICANT CHANGE UP (ref 6–8.3)
RBC # BLD: 4.55 M/UL — SIGNIFICANT CHANGE UP (ref 4.2–5.8)
RBC # FLD: 15 % — HIGH (ref 10.3–14.5)
SODIUM SERPL-SCNC: 140 MMOL/L — SIGNIFICANT CHANGE UP (ref 135–145)
SODIUM SERPL-SCNC: 140 MMOL/L — SIGNIFICANT CHANGE UP (ref 135–145)
SPECIMEN SOURCE: SIGNIFICANT CHANGE UP
SPECIMEN SOURCE: SIGNIFICANT CHANGE UP
WBC # BLD: 14.86 K/UL — HIGH (ref 3.8–10.5)
WBC # FLD AUTO: 14.86 K/UL — HIGH (ref 3.8–10.5)

## 2018-04-16 PROCEDURE — 45331 SIGMOIDOSCOPY AND BIOPSY: CPT | Mod: GC

## 2018-04-16 PROCEDURE — 99232 SBSQ HOSP IP/OBS MODERATE 35: CPT | Mod: 25,GC

## 2018-04-16 PROCEDURE — 99233 SBSQ HOSP IP/OBS HIGH 50: CPT

## 2018-04-16 PROCEDURE — 88341 IMHCHEM/IMCYTCHM EA ADD ANTB: CPT | Mod: 26

## 2018-04-16 PROCEDURE — 88342 IMHCHEM/IMCYTCHM 1ST ANTB: CPT | Mod: 26

## 2018-04-16 PROCEDURE — 88305 TISSUE EXAM BY PATHOLOGIST: CPT | Mod: 26

## 2018-04-16 RX ORDER — ONDANSETRON 8 MG/1
4 TABLET, FILM COATED ORAL ONCE
Qty: 0 | Refills: 0 | Status: COMPLETED | OUTPATIENT
Start: 2018-04-16 | End: 2018-04-16

## 2018-04-16 RX ADMIN — Medication 1200 MILLIGRAM(S): at 11:55

## 2018-04-16 RX ADMIN — Medication 1 TABLET(S): at 22:09

## 2018-04-16 RX ADMIN — Medication 1200 MILLIGRAM(S): at 05:41

## 2018-04-16 RX ADMIN — Medication 100 MILLIGRAM(S): at 18:59

## 2018-04-16 RX ADMIN — Medication 1200 MILLIGRAM(S): at 23:00

## 2018-04-16 RX ADMIN — Medication 1200 MILLIGRAM(S): at 17:30

## 2018-04-16 RX ADMIN — ONDANSETRON 4 MILLIGRAM(S): 8 TABLET, FILM COATED ORAL at 17:30

## 2018-04-16 RX ADMIN — PANTOPRAZOLE SODIUM 40 MILLIGRAM(S): 20 TABLET, DELAYED RELEASE ORAL at 05:41

## 2018-04-16 RX ADMIN — Medication 1000 MILLIGRAM(S): at 23:00

## 2018-04-16 NOTE — PROGRESS NOTE ADULT - PROBLEM SELECTOR PLAN 1
- CT AP w/ PO and IV contrast showed pancolitis consistent with UC flare and resulting colitis  -gastroenterology recommendations appreciated, s/p flex sig today with extensive colitis.  Will f/u further GI recs re: initiation of corticosteroid vs other agent.    - c/w lactobacillus  - Continue with increased mesalamine to 4.8 gms in 4 divided doses daily  - Canesa suppository QHS per GI  - C diff negative, check Stool culture, O&P x 3 sets

## 2018-04-16 NOTE — PROGRESS NOTE ADULT - PROBLEM SELECTOR PLAN 3
- CXR normal. RVP negative  - CT chest given immunosuppressed state without acute infection  - Trial of daily protonix for possible GERD related cough  - Tessalon Perle PRN

## 2018-04-16 NOTE — PROGRESS NOTE ADULT - PROBLEM SELECTOR PLAN 4
- Worsening anemia in setting of rectal bleeding, patient with occult blood negative, however there is morelia blood when he goes to bathroom and it is known he is having acute rectal bleeding this visit  - Continue to monitor CBC daily, no indication for pRBC transfusion currently  - Keep active Type and screen  - Check Fe studies in AM

## 2018-04-16 NOTE — PROGRESS NOTE ADULT - PROBLEM SELECTOR PLAN 2
- CORNELL resolving (baseline 1.1). Likely prerenal given poor PO intake and diarrhea  - Monitor Creatinine, pt able to tolerate PO intake, will d/c IVF

## 2018-04-16 NOTE — PROGRESS NOTE ADULT - ATTENDING COMMENTS
Pt seen and examined. Agree with fellow's note. Plan discussed with patient and primary team.     Patient had chief complaint of colitis    Would continue weekly humira. Will consider d/c mesalamine.     25 min spent in pt care, >50% time in care coordination/counseling re: management of UC Pt seen and examined. Agree with fellow's note. Plan discussed with patient and primary team.     Patient had chief complaint of colitis    Would continue weekly humira. Will consider d/c mesalamine.     Ct reviewed with radiology.     Will discuss case with Dr Franz.     25 min spent in pt care, >50% time in care coordination/counseling re: management of UC

## 2018-04-16 NOTE — PROGRESS NOTE ADULT - SUBJECTIVE AND OBJECTIVE BOX
Patient is a 37y old  Male who presents with a chief complaint of Cough and Diarrhea (2018 11:29)      SUBJECTIVE / OVERNIGHT EVENTS: Pt reports improvement in stool frequency - had 2 BMs this morning vs 20+ BMs associated with bloody mucous.  Abd pain has improved.       MEDICATIONS  (STANDING):  lactobacillus acidophilus 1 Tablet(s) Oral at bedtime  mesalamine DR Capsule 1200 milliGRAM(s) Oral every 6 hours  mesalamine Suppository 1000 milliGRAM(s) Rectal at bedtime  pantoprazole    Tablet 40 milliGRAM(s) Oral before breakfast  sodium chloride 0.9%. 1000 milliLiter(s) (75 mL/Hr) IV Continuous <Continuous>    MEDICATIONS  (PRN):  benzonatate 100 milliGRAM(s) Oral every 8 hours PRN Cough  ondansetron Injectable 4 milliGRAM(s) IV Push once PRN Nausea and/or Vomiting      Vital Signs Last 24 Hrs  T(C): 37.2 (2018 15:02), Max: 37.4 (2018 04:47)  T(F): 99 (2018 15:02), Max: 99.3 (2018 04:47)  HR: 92 (2018 15:02) (82 - 92)  BP: 132/97 (2018 15:02) (115/80 - 132/97)  BP(mean): --  RR: 17 (2018 15:02) (16 - 17)  SpO2: 100% (2018 15:02) (100% - 100%)  CAPILLARY BLOOD GLUCOSE        PHYSICAL EXAM  GENERAL: NAD, well-developed  HEAD:  Atraumatic, Normocephalic  EYES: EOMI, PERRLA, conjunctiva and sclera clear  NECK: Supple, No JVD  CHEST/LUNG: Clear to auscultation bilaterally; No wheeze  HEART: Regular rate and rhythm; No murmurs, rubs, or gallops  ABDOMEN: Soft, Mild tenderness diffuse, Nondistended; Bowel sounds present  EXTREMITIES:  2+ Peripheral Pulses, No clubbing, cyanosis, or edema  PSYCH: AAOx3  SKIN: No rashes or lesions    LABS:                        11.2   14.86 )-----------( 377      ( 2018 06:20 )             36.2     04-16    140  |  105  |  5<L>  ----------------------------<  100<H>  3.8   |  21<L>  |  1.42<H>    Ca    7.9<L>      2018 06:20  Phos  3.1     -  Mg     1.9         TPro  6.6  /  Alb  2.9<L>  /  TBili  0.3  /  DBili  x   /  AST  12  /  ALT  8   /  AlkPhos  60            Urinalysis Basic - ( 15 Apr 2018 01:47 )    Color: COLORL / Appearance: CLEAR / S.007 / pH: 7.0  Gluc: NEGATIVE / Ketone: TRACE  / Bili: NEGATIVE / Urobili: NORMAL mg/dL   Blood: NEGATIVE / Protein: NEGATIVE mg/dL / Nitrite: NEGATIVE   Leuk Esterase: NEGATIVE / RBC: 0-2 / WBC 2-5   Sq Epi: OCC / Non Sq Epi: x / Bacteria: x        RADIOLOGY & ADDITIONAL TESTS:    Imaging Personally Reviewed:  < from: CT Chest No Cont (18 @ 13:57) >  IMPRESSION: No pneumonia.    Tiny left lower lobe pulmonary nodules measuring up to 3 mm. If the   patient has clinical risk factors, such as smoking, CT of the chest can   be obtained in one year to evaluate for stability.    Cortical retention of contrast within the partially imaged left kidney   which can be seen in the setting of ATN as well as other etiologies   including infection or ischemia.    < end of copied text >    < from: CT Abdomen and Pelvis w/ Oral Cont and w/ IV Cont (18 @ 04:43) >  IMPRESSION: Pancolitis. No drainable collection.            < end of copied text >    < from: Flexible Sigmoidoscopy (18 @ 13:10) >  Impression:          - Inflammation was found from the rectum to the sigmoid                        colon. Biopsied.                       - One 6 mm polyp at the recto-sigmoid colon. Likely                        inflammatory. Resection not attempted.  Recommendation:      - Return patient to [facility] for ongoing care.                       - Await pathology results. Await culture results.                       - Advance to low residual diet.                       - Patient should have colonoscopy as outpatient, once                        his acute presentation subsides.                       - The findings and recommendations were discussed with                        the patient. Copy of report given to patient.  - The findings and recommendations were discussed with                        the patient's primary physician.    < end of copied text >      Consultant(s) Notes Reviewed:  GI

## 2018-04-16 NOTE — PROGRESS NOTE ADULT - SUBJECTIVE AND OBJECTIVE BOX
Patient is a 37y old  Male who presents with a chief complaint of Cough and Diarrhea (2018 11:29)      SUBJECTIVE / OVERNIGHT EVENTS:  7 bloody BM in past 24H.  MEDICATIONS  (STANDING):  lactobacillus acidophilus 1 Tablet(s) Oral at bedtime  mesalamine DR Capsule 1200 milliGRAM(s) Oral every 6 hours  mesalamine Suppository 1000 milliGRAM(s) Rectal at bedtime  pantoprazole    Tablet 40 milliGRAM(s) Oral before breakfast  sodium chloride 0.9%. 1000 milliLiter(s) (75 mL/Hr) IV Continuous <Continuous>    MEDICATIONS  (PRN):  benzonatate 100 milliGRAM(s) Oral every 8 hours PRN Cough  ondansetron Injectable 4 milliGRAM(s) IV Push once PRN Nausea and/or Vomiting              PHYSICAL EXAM:  GENERAL: NAD, well-developed  HEAD:  Atraumatic, Normocephalic  EYES: EOMI, PERRLA, conjunctiva and sclera anicteric  NECK: Supple, No JVD  CHEST/LUNG: Clear to auscultation bilaterally; No wheeze  HEART: Regular rate and rhythm; No murmurs, rubs, or gallops  ABDOMEN: Soft, Nontender, Nondistended; Bowel sounds present, no hepatosplenomegaly, no rebound or guarding  EXTREMITIES:  2+ Peripheral Pulses, No clubbing, cyanosis, or edema  PSYCH: AAOx3  NEUROLOGY: non-focal, no asterixis  SKIN: No rashes or lesion    LABS:                        11.2   14.86 )-----------( 377      ( 2018 06:20 )             36.2     04-16    140  |  105  |  5<L>  ----------------------------<  100<H>  3.8   |  21<L>  |  1.42<H>    Ca    7.9<L>      2018 06:20  Phos  3.1     04-16  Mg     1.9     -16    TPro  6.6  /  Alb  2.9<L>  /  TBili  0.3  /  DBili  x   /  AST  12  /  ALT  8   /  AlkPhos  60  -16    LIVER FUNCTIONS - ( 2018 06:20 )  Alb: 2.9 g/dL / Pro: 6.6 g/dL / ALK PHOS: 60 u/L / ALT: 8 u/L / AST: 12 u/L / GGT: x                 Urinalysis Basic - ( 15 Apr 2018 01:47 )    Color: COLORL / Appearance: CLEAR / S.007 / pH: 7.0  Gluc: NEGATIVE / Ketone: TRACE  / Bili: NEGATIVE / Urobili: NORMAL mg/dL   Blood: NEGATIVE / Protein: NEGATIVE mg/dL / Nitrite: NEGATIVE   Leuk Esterase: NEGATIVE / RBC: 0-2 / WBC 2-5   Sq Epi: OCC / Non Sq Epi: x / Bacteria: x        RADIOLOGY & ADDITIONAL TESTS:

## 2018-04-16 NOTE — PROGRESS NOTE ADULT - ASSESSMENT
done   Patient is a 38 yo M w/ UC (dx 3 years PTA, currently on Humira; with history of multiple flare 2/2 medication noncompliance due to insurance issues) now presenting with abdominal pain, diarrhea and cough.

## 2018-04-16 NOTE — PROGRESS NOTE ADULT - ASSESSMENT
Impression:    1. Severe UC (by Brittany/Xavi). Unclear if this represents loss of response to adalimumab vs. other superimposed process (bacterial or CMV colopathy). No signs of toxic/fulminant colitis at this time.cdiff negative    2. Nausea or vomiting: CT negative for obstruction. resolved.    3. CORNELL- may be secondary to ATN    Recommendation:   -supportive care with IVF, correction of electrolytes PRN, antiemetics for nausea  -checkGI PCR, stool culture, Ova and parasites X3 (pt has history of aeromonas so specify that this should be ruled out)  -flex sigmoidoscopy today  -if infectious workup negative patient may need corticosteroids vs. change to another agent (?Remicade vs. Vedolizumab), patient expresses hesitation about using CS.  -Check UA  -Check quantiferon-GOLD Impression:    1. Severe UC (by Brittany/Xavi). Unclear if this represents loss of response to adalimumab vs. other superimposed process (bacterial or CMV colopathy). No signs of toxic/fulminant colitis at this time.cdiff negative    2. Nausea or vomiting: CT negative for obstruction. resolved.    3. CORNLEL- may be secondary to ATN    Recommendation:   -supportive care with IVF, correction of electrolytes PRN, antiemetics for nausea  -checkGI PCR, stool culture, Ova and parasites X3 (pt has history of aeromonas so specify that this should be ruled out)  -flex sigmoidoscopy today. will biopsy for CMV.   -if infectious workup negative patient may need corticosteroids vs. change to another agent (?Remicade vs. Vedolizumab), patient expresses hesitation about using CS.  -Check UA  -Check quantiferon-GOLD (last negative in 2016). HBV immune  -patient is microcytic. check iron studies.   -diet as tolerated.

## 2018-04-17 LAB
BACTERIA STL CULT: SIGNIFICANT CHANGE UP
BASOPHILS # BLD AUTO: 0.08 K/UL — SIGNIFICANT CHANGE UP (ref 0–0.2)
BASOPHILS NFR BLD AUTO: 0.5 % — SIGNIFICANT CHANGE UP (ref 0–2)
BUN SERPL-MCNC: 4 MG/DL — LOW (ref 7–23)
CALCIUM SERPL-MCNC: 8 MG/DL — LOW (ref 8.4–10.5)
CHLORIDE SERPL-SCNC: 104 MMOL/L — SIGNIFICANT CHANGE UP (ref 98–107)
CO2 SERPL-SCNC: 21 MMOL/L — LOW (ref 22–31)
CREAT SERPL-MCNC: 1.44 MG/DL — HIGH (ref 0.5–1.3)
EOSINOPHIL # BLD AUTO: 0.96 K/UL — HIGH (ref 0–0.5)
EOSINOPHIL NFR BLD AUTO: 6.4 % — HIGH (ref 0–6)
FERRITIN SERPL-MCNC: 42.98 NG/ML — SIGNIFICANT CHANGE UP (ref 30–400)
GLUCOSE SERPL-MCNC: 89 MG/DL — SIGNIFICANT CHANGE UP (ref 70–99)
HCT VFR BLD CALC: 33.5 % — LOW (ref 39–50)
HGB BLD-MCNC: 10.7 G/DL — LOW (ref 13–17)
IMM GRANULOCYTES # BLD AUTO: 0.05 # — SIGNIFICANT CHANGE UP
IMM GRANULOCYTES NFR BLD AUTO: 0.3 % — SIGNIFICANT CHANGE UP (ref 0–1.5)
IRON SATN MFR SERPL: 127 UG/DL — LOW (ref 155–535)
IRON SATN MFR SERPL: 20 UG/DL — LOW (ref 45–165)
LYMPHOCYTES # BLD AUTO: 32.2 % — SIGNIFICANT CHANGE UP (ref 13–44)
LYMPHOCYTES # BLD AUTO: 4.83 K/UL — HIGH (ref 1–3.3)
M TB TUBERC IFN-G BLD QL: 0.01 IU/ML — SIGNIFICANT CHANGE UP
M TB TUBERC IFN-G BLD QL: 0.05 IU/ML — SIGNIFICANT CHANGE UP
M TB TUBERC IFN-G BLD QL: NEGATIVE — SIGNIFICANT CHANGE UP
MCHC RBC-ENTMCNC: 25 PG — LOW (ref 27–34)
MCHC RBC-ENTMCNC: 31.9 % — LOW (ref 32–36)
MCV RBC AUTO: 78.3 FL — LOW (ref 80–100)
MITOGEN IGNF BCKGRD COR BLD-ACNC: >10 IU/ML — SIGNIFICANT CHANGE UP
MONOCYTES # BLD AUTO: 1.59 K/UL — HIGH (ref 0–0.9)
MONOCYTES NFR BLD AUTO: 10.6 % — SIGNIFICANT CHANGE UP (ref 2–14)
NEUTROPHILS # BLD AUTO: 7.47 K/UL — HIGH (ref 1.8–7.4)
NEUTROPHILS NFR BLD AUTO: 50 % — SIGNIFICANT CHANGE UP (ref 43–77)
NRBC # FLD: 0 — SIGNIFICANT CHANGE UP
PLATELET # BLD AUTO: 325 K/UL — SIGNIFICANT CHANGE UP (ref 150–400)
PMV BLD: 10.4 FL — SIGNIFICANT CHANGE UP (ref 7–13)
POTASSIUM SERPL-MCNC: 3.5 MMOL/L — SIGNIFICANT CHANGE UP (ref 3.5–5.3)
POTASSIUM SERPL-SCNC: 3.5 MMOL/L — SIGNIFICANT CHANGE UP (ref 3.5–5.3)
RBC # BLD: 4.28 M/UL — SIGNIFICANT CHANGE UP (ref 4.2–5.8)
RBC # FLD: 15.2 % — HIGH (ref 10.3–14.5)
SODIUM SERPL-SCNC: 139 MMOL/L — SIGNIFICANT CHANGE UP (ref 135–145)
UIBC SERPL-MCNC: 107.2 UG/DL — LOW (ref 110–370)
WBC # BLD: 14.98 K/UL — HIGH (ref 3.8–10.5)
WBC # FLD AUTO: 14.98 K/UL — HIGH (ref 3.8–10.5)

## 2018-04-17 PROCEDURE — 99233 SBSQ HOSP IP/OBS HIGH 50: CPT

## 2018-04-17 PROCEDURE — 99232 SBSQ HOSP IP/OBS MODERATE 35: CPT | Mod: GC

## 2018-04-17 RX ORDER — ACETAMINOPHEN 500 MG
650 TABLET ORAL EVERY 6 HOURS
Qty: 0 | Refills: 0 | Status: DISCONTINUED | OUTPATIENT
Start: 2018-04-17 | End: 2018-04-18

## 2018-04-17 RX ORDER — ONDANSETRON 8 MG/1
4 TABLET, FILM COATED ORAL EVERY 8 HOURS
Qty: 0 | Refills: 0 | Status: DISCONTINUED | OUTPATIENT
Start: 2018-04-17 | End: 2018-04-18

## 2018-04-17 RX ADMIN — Medication 100 MILLIGRAM(S): at 13:20

## 2018-04-17 RX ADMIN — Medication 1 TABLET(S): at 21:58

## 2018-04-17 RX ADMIN — Medication 1000 MILLIGRAM(S): at 21:58

## 2018-04-17 RX ADMIN — Medication 1200 MILLIGRAM(S): at 13:19

## 2018-04-17 RX ADMIN — Medication 1200 MILLIGRAM(S): at 06:09

## 2018-04-17 RX ADMIN — Medication 1200 MILLIGRAM(S): at 17:40

## 2018-04-17 RX ADMIN — Medication 100 MILLIGRAM(S): at 03:51

## 2018-04-17 RX ADMIN — Medication 650 MILLIGRAM(S): at 18:30

## 2018-04-17 RX ADMIN — PANTOPRAZOLE SODIUM 40 MILLIGRAM(S): 20 TABLET, DELAYED RELEASE ORAL at 06:09

## 2018-04-17 RX ADMIN — ONDANSETRON 4 MILLIGRAM(S): 8 TABLET, FILM COATED ORAL at 13:19

## 2018-04-17 RX ADMIN — Medication 100 MILLIGRAM(S): at 21:58

## 2018-04-17 RX ADMIN — ONDANSETRON 4 MILLIGRAM(S): 8 TABLET, FILM COATED ORAL at 07:12

## 2018-04-17 RX ADMIN — Medication 650 MILLIGRAM(S): at 17:40

## 2018-04-17 NOTE — PROGRESS NOTE ADULT - PROBLEM SELECTOR PLAN 4
- Worsening anemia in setting of rectal bleeding, patient with occult blood negative, however there is morelia blood when he goes to bathroom and it is known he is having acute rectal bleeding this visit  - Continue to monitor CBC daily, no indication for pRBC transfusion currently  - Fe studies suggestive of mild Fe deficiency anemia; will monitor for now and not start Fe supplementation in setting of acute IBD flare

## 2018-04-17 NOTE — PROGRESS NOTE ADULT - PROBLEM SELECTOR PLAN 1
- CT AP w/ PO and IV contrast showed pancolitis consistent with UC flare and resulting colitis  -gastroenterology recommendations appreciated, s/p flex sig with extensive colitis.  Will f/u further GI recs re: initiation of corticosteroid vs other agent.  Pt to resume weekly Humira (next due Thursday, pt self injects at home)   - c/w lactobacillus  - Continue with increased mesalamine to 4.8 gms in 4 divided doses daily  - Canesa suppository QHS per GI  - C diff negative, GI PCR panel (so far 1 sample negative), O&P x 3 sets pending

## 2018-04-17 NOTE — PROGRESS NOTE ADULT - PROBLEM SELECTOR PLAN 3
- CXR normal. RVP negative  - CT chest given immunosuppressed state without acute infection  - Trial of daily protonix for possible GERD related cough, would transition to Pepcid after 2 weeks.   - Tessalon Perle PRN

## 2018-04-17 NOTE — PROGRESS NOTE ADULT - PROBLEM SELECTOR PLAN 2
- CORNELL resolving (baseline 1.1). Likely prerenal given poor PO intake and diarrhea  - Monitor Creatinine, pt able to tolerate PO intake

## 2018-04-17 NOTE — CHART NOTE - NSCHARTNOTEFT_GEN_A_CORE
Case d/w patient and his primary GI, Dr. Franz. Dr. Franz recommends starting Remicade in light of likely failure of Humira. Patient wants to think about it as he feels his symptoms are improved and he may be returning to his B/L (~7Bm/d) and wonders if this exacerbation may have been from a superimiposed viral infection. Regardless, his sigmoidoscopy demonstrates uncontrolled disease, and he may still benefit from Remicade, but patient wants to consider Remicade vs. steroids vs. observation. We explained in depth the risk/benefits/alternatives to Remicade. Case d/w patient and his primary GI, Dr. Franz. Dr. Franz recommends starting Remicade in light of likely failure of Humira. Patient wants to think about it as he feels his symptoms are improved and he may be returning to his B/L (~7Bm/d) and wonders if this exacerbation may have been from a superimiposed viral infection. Regardless, his sigmoidoscopy demonstrates uncontrolled disease, and he may still benefit from Remicade, but patient wants to consider Remicade vs. steroids vs. observation. We explained in depth the risk/benefits/alternatives to Remicade.  Like all biologics, Remicade carries increased risk of allergic reactions, infection, malignancy.     Mother in room as well.     Awaiting path    Pt seen and examined. Agree with fellow's note. Plan discussed with patient and primary team.     Patient had chief complaint of presumed UC flare    25 min spent in pt care, >50% in care coordination/counseling, discussing as above.

## 2018-04-17 NOTE — PROGRESS NOTE ADULT - ASSESSMENT
Patient is a 38 yo M w/ UC (dx 3 years PTA, currently on Humira; with history of multiple flare 2/2 medication noncompliance due to insurance issues) now presenting with abdominal pain and diarrhea likely c/w IBD flare.

## 2018-04-17 NOTE — PROGRESS NOTE ADULT - SUBJECTIVE AND OBJECTIVE BOX
Patient is a 37y old  Male who presents with a chief complaint of Cough and Diarrhea (14 Apr 2018 11:29)      SUBJECTIVE / OVERNIGHT EVENTS: Pt had episode of vomiting this morning, which again triggered his coughing fits.  He's concerned about uncontrolled reflux causing his cough, but has been taking daily Protonix.  Stools have overall improved, had 7 episodes with blood yesterday, and 2 so far today.  Remains afebrile, abd pain improved.       MEDICATIONS  (STANDING):  lactobacillus acidophilus 1 Tablet(s) Oral at bedtime  mesalamine DR Capsule 1200 milliGRAM(s) Oral every 6 hours  mesalamine Suppository 1000 milliGRAM(s) Rectal at bedtime  pantoprazole    Tablet 40 milliGRAM(s) Oral before breakfast    MEDICATIONS  (PRN):  benzonatate 100 milliGRAM(s) Oral every 8 hours PRN Cough      Vital Signs Last 24 Hrs  T(C): 37.1 (17 Apr 2018 06:08), Max: 37.2 (16 Apr 2018 15:02)  T(F): 98.8 (17 Apr 2018 06:08), Max: 99 (16 Apr 2018 15:02)  HR: 93 (17 Apr 2018 06:08) (92 - 93)  BP: 121/88 (17 Apr 2018 06:08) (113/82 - 132/97)  BP(mean): --  RR: 16 (17 Apr 2018 06:08) (16 - 17)  SpO2: 100% (17 Apr 2018 06:08) (100% - 100%)  CAPILLARY BLOOD GLUCOSE      PHYSICAL EXAM  GENERAL: NAD, well-developed  HEAD:  Atraumatic, Normocephalic  EYES: EOMI, PERRLA, conjunctiva and sclera clear  NECK: Supple, No JVD  CHEST/LUNG: Clear to auscultation bilaterally; No wheeze  HEART: Regular rate and rhythm; No murmurs, rubs, or gallops  ABDOMEN: Soft, Minimally tender, Nondistended; Bowel sounds present  EXTREMITIES:  2+ Peripheral Pulses, No clubbing, cyanosis, or edema  PSYCH: AAOx3  SKIN: No rashes or lesions    LABS:                        10.7   14.98 )-----------( 325      ( 17 Apr 2018 06:00 )             33.5     04-17    139  |  104  |  4<L>  ----------------------------<  89  3.5   |  21<L>  |  1.44<H>    Ca    8.0<L>      17 Apr 2018 06:00  Phos  3.1     04-16  Mg     1.9     04-16    TPro  6.6  /  Alb  2.9<L>  /  TBili  0.3  /  DBili  x   /  AST  12  /  ALT  8   /  AlkPhos  60  04-16          Consultant(s) Notes Reviewed:  GI

## 2018-04-18 ENCOUNTER — TRANSCRIPTION ENCOUNTER (OUTPATIENT)
Age: 38
End: 2018-04-18

## 2018-04-18 VITALS
TEMPERATURE: 99 F | RESPIRATION RATE: 18 BRPM | DIASTOLIC BLOOD PRESSURE: 89 MMHG | OXYGEN SATURATION: 100 % | HEART RATE: 94 BPM | SYSTOLIC BLOOD PRESSURE: 188 MMHG

## 2018-04-18 LAB
ALBUMIN SERPL ELPH-MCNC: 3 G/DL — LOW (ref 3.3–5)
ALP SERPL-CCNC: 52 U/L — SIGNIFICANT CHANGE UP (ref 40–120)
ALT FLD-CCNC: 14 U/L — SIGNIFICANT CHANGE UP (ref 4–41)
AST SERPL-CCNC: 21 U/L — SIGNIFICANT CHANGE UP (ref 4–40)
BASOPHILS # BLD AUTO: 0.07 K/UL — SIGNIFICANT CHANGE UP (ref 0–0.2)
BASOPHILS NFR BLD AUTO: 0.5 % — SIGNIFICANT CHANGE UP (ref 0–2)
BILIRUB SERPL-MCNC: 0.3 MG/DL — SIGNIFICANT CHANGE UP (ref 0.2–1.2)
BUN SERPL-MCNC: 7 MG/DL — SIGNIFICANT CHANGE UP (ref 7–23)
CALCIUM SERPL-MCNC: 8.3 MG/DL — LOW (ref 8.4–10.5)
CHLORIDE SERPL-SCNC: 103 MMOL/L — SIGNIFICANT CHANGE UP (ref 98–107)
CO2 SERPL-SCNC: 22 MMOL/L — SIGNIFICANT CHANGE UP (ref 22–31)
CREAT SERPL-MCNC: 1.46 MG/DL — HIGH (ref 0.5–1.3)
EOSINOPHIL # BLD AUTO: 1.09 K/UL — HIGH (ref 0–0.5)
EOSINOPHIL NFR BLD AUTO: 8.3 % — HIGH (ref 0–6)
GLUCOSE SERPL-MCNC: 98 MG/DL — SIGNIFICANT CHANGE UP (ref 70–99)
HBV SURFACE AB SER-ACNC: NONREACTIVE — SIGNIFICANT CHANGE UP
HBV SURFACE AG SER-ACNC: NEGATIVE — SIGNIFICANT CHANGE UP
HCT VFR BLD CALC: 35 % — LOW (ref 39–50)
HGB BLD-MCNC: 11.1 G/DL — LOW (ref 13–17)
IMM GRANULOCYTES # BLD AUTO: 0.05 # — SIGNIFICANT CHANGE UP
IMM GRANULOCYTES NFR BLD AUTO: 0.4 % — SIGNIFICANT CHANGE UP (ref 0–1.5)
LYMPHOCYTES # BLD AUTO: 44.7 % — HIGH (ref 13–44)
LYMPHOCYTES # BLD AUTO: 5.9 K/UL — HIGH (ref 1–3.3)
MAGNESIUM SERPL-MCNC: 1.9 MG/DL — SIGNIFICANT CHANGE UP (ref 1.6–2.6)
MCHC RBC-ENTMCNC: 24.9 PG — LOW (ref 27–34)
MCHC RBC-ENTMCNC: 31.7 % — LOW (ref 32–36)
MCV RBC AUTO: 78.7 FL — LOW (ref 80–100)
MONOCYTES # BLD AUTO: 1.38 K/UL — HIGH (ref 0–0.9)
MONOCYTES NFR BLD AUTO: 10.4 % — SIGNIFICANT CHANGE UP (ref 2–14)
NEUTROPHILS # BLD AUTO: 4.72 K/UL — SIGNIFICANT CHANGE UP (ref 1.8–7.4)
NEUTROPHILS NFR BLD AUTO: 35.7 % — LOW (ref 43–77)
NRBC # FLD: 0 — SIGNIFICANT CHANGE UP
O+P SPEC CONC: SIGNIFICANT CHANGE UP
O+P SPEC CONC: SIGNIFICANT CHANGE UP
PHOSPHATE SERPL-MCNC: 3.4 MG/DL — SIGNIFICANT CHANGE UP (ref 2.5–4.5)
PLATELET # BLD AUTO: 351 K/UL — SIGNIFICANT CHANGE UP (ref 150–400)
PMV BLD: 10.6 FL — SIGNIFICANT CHANGE UP (ref 7–13)
POTASSIUM SERPL-MCNC: 3.6 MMOL/L — SIGNIFICANT CHANGE UP (ref 3.5–5.3)
POTASSIUM SERPL-SCNC: 3.6 MMOL/L — SIGNIFICANT CHANGE UP (ref 3.5–5.3)
PROT SERPL-MCNC: 6.6 G/DL — SIGNIFICANT CHANGE UP (ref 6–8.3)
RBC # BLD: 4.45 M/UL — SIGNIFICANT CHANGE UP (ref 4.2–5.8)
RBC # FLD: 15.3 % — HIGH (ref 10.3–14.5)
SODIUM SERPL-SCNC: 138 MMOL/L — SIGNIFICANT CHANGE UP (ref 135–145)
SPECIMEN SOURCE: SIGNIFICANT CHANGE UP
TRI STN SPEC: SIGNIFICANT CHANGE UP
TRI STN SPEC: SIGNIFICANT CHANGE UP
WBC # BLD: 13.21 K/UL — HIGH (ref 3.8–10.5)
WBC # FLD AUTO: 13.21 K/UL — HIGH (ref 3.8–10.5)

## 2018-04-18 PROCEDURE — 99239 HOSP IP/OBS DSCHRG MGMT >30: CPT

## 2018-04-18 RX ORDER — MESALAMINE 400 MG
4 TABLET, DELAYED RELEASE (ENTERIC COATED) ORAL
Qty: 0 | Refills: 0 | COMMUNITY

## 2018-04-18 RX ORDER — MESALAMINE 400 MG
4 TABLET, DELAYED RELEASE (ENTERIC COATED) ORAL
Qty: 120 | Refills: 0 | OUTPATIENT
Start: 2018-04-18 | End: 2018-05-17

## 2018-04-18 RX ORDER — PANTOPRAZOLE SODIUM 20 MG/1
1 TABLET, DELAYED RELEASE ORAL
Qty: 30 | Refills: 0 | OUTPATIENT
Start: 2018-04-18 | End: 2018-05-17

## 2018-04-18 RX ORDER — ONDANSETRON 8 MG/1
1 TABLET, FILM COATED ORAL
Qty: 45 | Refills: 0 | OUTPATIENT
Start: 2018-04-18 | End: 2018-05-02

## 2018-04-18 RX ORDER — FAMOTIDINE 10 MG/ML
1 INJECTION INTRAVENOUS
Qty: 57 | Refills: 0 | OUTPATIENT
Start: 2018-04-18

## 2018-04-18 RX ORDER — MESALAMINE 400 MG
1 TABLET, DELAYED RELEASE (ENTERIC COATED) ORAL
Qty: 30 | Refills: 0 | OUTPATIENT
Start: 2018-04-18 | End: 2018-05-17

## 2018-04-18 RX ORDER — MESALAMINE 400 MG
3 TABLET, DELAYED RELEASE (ENTERIC COATED) ORAL
Qty: 360 | Refills: 0 | OUTPATIENT
Start: 2018-04-18 | End: 2018-05-17

## 2018-04-18 RX ORDER — ACETAMINOPHEN 500 MG
2 TABLET ORAL
Qty: 0 | Refills: 0 | COMMUNITY
Start: 2018-04-18

## 2018-04-18 RX ORDER — FERROUS SULFATE 325(65) MG
1 TABLET ORAL
Qty: 30 | Refills: 0 | OUTPATIENT
Start: 2018-04-18 | End: 2018-05-17

## 2018-04-18 RX ORDER — MESALAMINE 400 MG
60 TABLET, DELAYED RELEASE (ENTERIC COATED) ORAL
Qty: 30 | Refills: 0 | OUTPATIENT
Start: 2018-04-18 | End: 2018-05-17

## 2018-04-18 RX ADMIN — Medication 100 MILLIGRAM(S): at 15:05

## 2018-04-18 RX ADMIN — PANTOPRAZOLE SODIUM 40 MILLIGRAM(S): 20 TABLET, DELAYED RELEASE ORAL at 05:27

## 2018-04-18 RX ADMIN — Medication 100 MILLIGRAM(S): at 06:16

## 2018-04-18 RX ADMIN — Medication 1200 MILLIGRAM(S): at 05:26

## 2018-04-18 RX ADMIN — Medication 1200 MILLIGRAM(S): at 12:51

## 2018-04-18 RX ADMIN — Medication 1200 MILLIGRAM(S): at 00:06

## 2018-04-18 NOTE — DISCHARGE NOTE ADULT - MEDICATION SUMMARY - MEDICATIONS TO CHANGE
I will SWITCH the dose or number of times a day I take the medications listed below when I get home from the hospital:    Lialda 1.2 g oral delayed release tablet  -- 4 tab(s) by mouth once a day (in the evening) I will SWITCH the dose or number of times a day I take the medications listed below when I get home from the hospital:  None

## 2018-04-18 NOTE — DISCHARGE NOTE ADULT - ADDITIONAL INSTRUCTIONS
3mm left lower lobe pulm nodule 3mm  CT of the chest can   be obtained in one year to evaluate for stability.  Patient should have colonoscopy as outpatient, once his acute presentation subsides.  Please follow up with you pcp and gastroenterologist  within 1 week of discharge.  Please call to make an appointment within 1 week of discharge.

## 2018-04-18 NOTE — PROGRESS NOTE ADULT - PROBLEM SELECTOR PLAN 1
- CT AP w/ PO and IV contrast showed pancolitis consistent with UC flare and resulting colitis  -gastroenterology recommendations appreciated, s/p flex sig with extensive colitis.  Pt refusing Remicade, wants to continue with weekly Humira and daily mesalamine despite extensive discussion with GI team and myself.    - c/w lactobacillus  - Continue with increased mesalamine to 4.8 gms in 4 divided doses daily  - Canesa suppository QHS per GI  - C diff negative, GI PCR panel (so far 1 sample negative), O&P x 3 sets pending  - D/C home today with mesalamine and humira with outpt GI f/u with Dr. Franz

## 2018-04-18 NOTE — DISCHARGE NOTE ADULT - MEDICATION SUMMARY - MEDICATIONS TO TAKE
I will START or STAY ON the medications listed below when I get home from the hospital:    mesalamine 400 mg oral delayed release capsule  -- 3 cap(s) by mouth every 6 hours  -- Indication: For Ulcerative colitis    mesalamine 1000 mg rectal suppository  -- 1 suppository(ies) rectally once a day (at bedtime)  -- Indication: For Ulcerative colitis    acetaminophen 325 mg oral tablet  -- 2 tab(s) by mouth every 6 hours, As needed, Mild Pain (1 - 3)  -- Indication: For pain    Zofran 4 mg oral tablet  -- 1 tab(s) by mouth every 8 hours   -- Indication: For Nausea & vomiting    benzonatate 100 mg oral capsule  -- 1 cap(s) by mouth every 8 hours, As needed, Cough  -- Indication: For Cough    Humira 40 mg/0.8 mL subcutaneous kit  -- Inject once a week on Thursdays  -- Indication: For Ulcerative colitis    ferrous sulfate 325 mg (65 mg elemental iron) oral delayed release tablet  -- 1 tab(s) by mouth once a day   -- May discolor urine or feces.  Swallow whole.  Do not crush.    -- Indication: For Anemia    lactobacillus acidophilus oral capsule  -- 1 cap(s) by mouth once a day  -- Indication: For prophylactic measure    pantoprazole 40 mg oral delayed release tablet  -- 1 tab(s) by mouth once a day (before a meal)  -- Indication: For prophylactic measure I will START or STAY ON the medications listed below when I get home from the hospital:    mesalamine 400 mg oral delayed release capsule  -- 3 cap(s) by mouth every 6 hours  -- Indication: For Ulcerative colitis    mesalamine 1000 mg rectal suppository  -- 1 suppository(ies) rectally once a day (at bedtime)  -- Indication: For Ulcerative colitis    acetaminophen 325 mg oral tablet  -- 2 tab(s) by mouth every 6 hours, As needed, Mild Pain (1 - 3)  -- Indication: For pain    Zofran 4 mg oral tablet  -- 1 tab(s) by mouth every 8 hours   -- Indication: For Nausea & vomiting    benzonatate 100 mg oral capsule  -- 1 cap(s) by mouth every 8 hours, As needed, Cough  -- Indication: For Cough    Pepcid 20 mg oral tablet  -- 1 tab(s) by mouth 2 times a day MDD:40  -- It is very important that you take or use this exactly as directed.  Do not skip doses or discontinue unless directed by your doctor.  Obtain medical advice before taking any non-prescription drugs as some may affect the action of this medication.    -- Indication: For GERD    Humira 40 mg/0.8 mL subcutaneous kit  -- Inject once a week on Thursdays  -- Indication: For Ulcerative colitis    ferrous sulfate 325 mg (65 mg elemental iron) oral delayed release tablet  -- 1 tab(s) by mouth once a day   -- May discolor urine or feces.  Swallow whole.  Do not crush.    -- Indication: For Anemia    lactobacillus acidophilus oral capsule  -- 1 cap(s) by mouth once a day  -- Indication: For prophylactic measure    pantoprazole 40 mg oral delayed release tablet  -- 1 tab(s) by mouth once a day (before a meal)  -- Indication: For prophylactic measure I will START or STAY ON the medications listed below when I get home from the hospital:    mesalamine 400 mg oral delayed release capsule  -- 3 cap(s) by mouth every 6 hours  -- Indication: For Ulcerative colitis    mesalamine 1000 mg rectal suppository  -- 1 suppository(ies) rectally once a day (at bedtime)  -- Indication: For Ulcerative colitis    acetaminophen 325 mg oral tablet  -- 2 tab(s) by mouth every 6 hours, As needed, Mild Pain (1 - 3)  -- Indication: For pain    Zofran 4 mg oral tablet  -- 1 tab(s) by mouth every 8 hours   -- Indication: For Nausea & vomiting    benzonatate 100 mg oral capsule  -- 1 cap(s) by mouth every 8 hours, As needed, Cough  -- Indication: For Cough    Pepcid 20 mg oral tablet  -- 1 tab(s) by mouth 2 times a day MDD:40  -- It is very important that you take or use this exactly as directed.  Do not skip doses or discontinue unless directed by your doctor.  Obtain medical advice before taking any non-prescription drugs as some may affect the action of this medication.    -- Indication: For GERD    Humira 40 mg/0.8 mL subcutaneous kit  -- Inject once a week on Thursdays  -- Indication: For Ulcerative colitis    lactobacillus acidophilus oral capsule  -- 1 cap(s) by mouth once a day  -- Indication: For prophylactic measure    pantoprazole 40 mg oral delayed release tablet  -- 1 tab(s) by mouth once a day (before a meal)  -- Indication: For prophylactic measure I will START or STAY ON the medications listed below when I get home from the hospital:    mesalamine 400 mg oral delayed release capsule  -- 3 cap(s) by mouth every 6 hours  -- Indication: For Ulcerative colitis    mesalamine 1000 mg rectal suppository  -- 1 suppository(ies) rectally once a day (at bedtime)  -- Indication: For Ulcerative colitis    acetaminophen 325 mg oral tablet  -- 2 tab(s) by mouth every 6 hours, As needed, Mild Pain (1 - 3)  -- Indication: For pain    Zofran ODT 4 mg oral tablet, disintegrating  -- 1 tab(s) by mouth every 8 hours, As Needed   -- Indication: For Nausea & vomiting    benzonatate 100 mg oral capsule  -- 1 cap(s) by mouth every 8 hours, As needed, Cough  -- Indication: For Cough    Pepcid 20 mg oral tablet  -- 1 tab(s) by mouth 2 times a day MDD:40  -- It is very important that you take or use this exactly as directed.  Do not skip doses or discontinue unless directed by your doctor.  Obtain medical advice before taking any non-prescription drugs as some may affect the action of this medication.    -- Indication: For GERD    Humira 40 mg/0.8 mL subcutaneous kit  -- Inject once a week on Thursdays  -- Indication: For Ulcerative colitis    lactobacillus acidophilus oral capsule  -- 1 cap(s) by mouth once a day  -- Indication: For prophylactic measure    pantoprazole 40 mg oral delayed release tablet  -- 1 tab(s) by mouth once a day (before a meal)  -- Indication: For prophylactic measure I will START or STAY ON the medications listed below when I get home from the hospital:    Lialda 1.2 g oral delayed release tablet  -- 4 tab(s) by mouth once a day (in the evening)  -- Indication: For Ulcerative colitis    Rowasa 4 g/60 mL rectal kit  -- 60 milliliter(s) rectally once a day (at bedtime)   -- For rectal use only.  Shake well before use.    -- Indication: For Ulcerative colitis    acetaminophen 325 mg oral tablet  -- 2 tab(s) by mouth every 6 hours, As needed, Mild Pain (1 - 3)  -- Indication: For pain    Zofran ODT 4 mg oral tablet, disintegrating  -- 1 tab(s) by mouth every 8 hours, As Needed   -- Indication: For Nausea & vomiting    benzonatate 100 mg oral capsule  -- 1 cap(s) by mouth every 8 hours, As needed, Cough  -- Indication: For Cough    Pepcid 20 mg oral tablet  -- 1 tab(s) by mouth 2 times a day MDD:40  -- It is very important that you take or use this exactly as directed.  Do not skip doses or discontinue unless directed by your doctor.  Obtain medical advice before taking any non-prescription drugs as some may affect the action of this medication.    -- Indication: For GERD    Humira 40 mg/0.8 mL subcutaneous kit  -- Inject once a week on Thursdays  -- Indication: For Ulcerative colitis    lactobacillus acidophilus oral capsule  -- 1 cap(s) by mouth once a day  -- Indication: For prophylactic measure    pantoprazole 40 mg oral delayed release tablet  -- 1 tab(s) by mouth once a day (before a meal)  -- Indication: For prophylactic measure

## 2018-04-18 NOTE — DISCHARGE NOTE ADULT - PATIENT PORTAL LINK FT
You can access the [facility], offered by [facility], by registering with the following website: [email or web address]

## 2018-04-18 NOTE — PROGRESS NOTE ADULT - PROBLEM SELECTOR PLAN 2
- CORNELL resolving (baseline 1.1). Likely prerenal given poor PO intake and diarrhea  - Monitor Creatinine, pt able to tolerate PO intake - Sepsis has been ruled out and pt has non-infectious SIRS and not contributing to his CORNELL   - CORNELL resolving (baseline 1.1). Likely prerenal given poor PO intake and diarrhea  - Monitor Creatinine, pt able to tolerate PO intake

## 2018-04-18 NOTE — DISCHARGE NOTE ADULT - HOSPITAL COURSE
Patient is a 36 yo M w/ UC (dx 3 years PTA, currently on Humira; with history of multiple flare 2/2 medication noncompliance due to insurance issues) now presenting with abdominal pain, diarrhea and cough.     ULCERATIVE COLITIS   -CT AP w/ PO and IV contrast showed pancolitis consistent with UC flare and resulting colitis  -gastroenterology recommendations appreciated, s/p flex sig with extensive colitis: follow up pathology results--------------------------  -Pt to resume weekly Humira (next due Thursday, pt self injects at home)   - c/w lactobacillus  - Continue with increased mesalamine to 4.8 gms in 4 divided doses daily  - Canesa suppository QHS per GI  -stool cx:1st:neg, 2nd neg3rd____, O&P 1st neg,2ndneg,3rd______  -f/u viral stool______________  -GI PCR neg   -Quantifieron gold: neg   -primary GI, Dr. Franz. Dr. Franz recommends starting Remicade in light of likely failure of Humira  -pt refused remicade he wants to continue humira weekly and meselamine  -Patient should have colonoscopy as outpatient, once his acute presentation subsides.  -Pt to follow up with outpt gastroenterologist       CORNELL (acute kidney injury)  - CORNELL resolving (baseline 1.1).  - Likely prerenal given poor PO intake and diarrhea  - Monitor Creatinine  -pt able to tolerate PO intake.     Cough  - CXR normal. RVP negative  - CT chest given immunosuppressed state without acute infection  - Trial of daily protonix for possible GERD related cough, would transition to Pepcid after 2 weeks.   - Tessalon Perle PRN.     ANEMIA   - Worsening anemia in setting of rectal bleeding, patient with occult blood negative, however there is morelia blood when he goes to bathroom and it is known he is having acute rectal bleeding this visit  - Continue to monitor CBC daily, no indication for pRBC transfusion currently  - Fe studies suggestive of mild Fe deficiency anemia; will monitor for now and not start Fe supplementation in setting of acute IBD flare.        DVT ppx  - SCDs for now.    3mm left lower lobe pulm nodule 3mm  CT of the chest can   be obtained in one year to evaluate for stability.  Patient should have colonoscopy as outpatient, once his acute presentation subsides.  Please follow up with you pcp and gastroenterologist  within 1 week of discharge.  Please call to make an appointment within 1 week of discharge.    Dispo: Home Patient is a 38 yo M w/ UC (dx 3 years PTA, currently on Humira; with history of multiple flare 2/2 medication noncompliance due to insurance issues) now presenting with abdominal pain, diarrhea and cough.   The patient was admitted for UC flare and underwent a flex sigmoidoscopy showing extensive pancolitis.  The patient was advised to start Remicade after consultation with inpatient GI team as well as his outpatient GI provider Dr. Franz.  After extensive conversation, the patient continued to refuse Remicade and felt that he was improving with his weekly Humira injections.  Cdiff was ruled out during admission and pt had no other evidence of infectious colitis.  Pt was advised to f/u with Dr. Franz after discharge and to resume weekly Humira and daily mesalamine.      For follow up:  3mm left lower lobe pulm nodule 3mm  CT of the chest can be obtained in one year to evaluate for stability.  Patient should have colonoscopy as outpatient, once his acute presentation subsides.  Please follow up with you pcp and gastroenterologist  within 1 week of discharge.  Please call to make an appointment within 1 week of discharge.    Dispo: Home Patient is a 36 yo M w/ UC (dx 3 years PTA, currently on Humira; with history of multiple flare 2/2 medication noncompliance due to insurance issues) now presenting with abdominal pain, diarrhea and cough.   The patient was admitted for UC flare and underwent a flex sigmoidoscopy showing extensive pancolitis.  The patient was advised to start Remicade after consultation with inpatient GI team as well as his outpatient GI provider Dr. Franz.  After extensive conversation, the patient continued to refuse Remicade and felt that he was improving with his weekly Humira injections.  Cdiff was ruled out during admission and pt had no other evidence of infectious colitis.  Pt was advised to f/u with Dr. Franz after discharge and to resume weekly Humira, Lialda, and Rowasa Enema     ULCERATIVE COLITIS   -CT AP w/ PO and IV contrast showed pancolitis consistent with UC flare and resulting colitis  -gastroenterology recommendations appreciated, s/p flex sig with extensive colitis: follow up pathology results--------------------------  -Pt to resume weekly Humira (next due Thursday, pt self injects at home)   - c/w lactobacillus  - Continue with increased mesalamine to 4.8 gms in 4 divided doses daily  - Canesa suppository QHS per GI  -stool cx:1st:neg, 2nd neg3rd____, O&P 1st neg,2ndneg,3rd______  -f/u viral stool______________  -GI PCR neg   -Quantifieron gold: neg   -primary GI, Dr. Franz. Dr. Franz recommends starting Remicade in light of likely failure of Humira  -pt refused remicade he wants to continue humira weekly and meselamine  -Patient should have colonoscopy as outpatient, once his acute presentation subsides.  -Pt to follow up with outpt gastroenterologist   -resume weekly Humira, Lialda, and Rowasa Enema       CORNELL (acute kidney injury)  - CORNELL resolving (baseline 1.1).  - Likely prerenal given poor PO intake and diarrhea  - Monitor Creatinine  -pt able to tolerate PO intake.     Cough  - CXR normal. RVP negative  - CT chest given immunosuppressed state without acute infection  - Trial of daily protonix for possible GERD related cough, would transition to Pepcid after 2 weeks.   - Tessalon Perle PRN.     ANEMIA   - Worsening anemia in setting of rectal bleeding, patient with occult blood negative, however there is morelia blood when he goes to bathroom and it is known he is having acute rectal bleeding this visit  - Continue to monitor CBC daily, no indication for pRBC transfusion currently  - Fe studies suggestive of mild Fe deficiency anemia; will monitor for now and not start Fe supplementation in setting of acute IBD flare.        DVT ppx  - SCDs for now.    Dispo: Home     For follow up:  3mm left lower lobe pulm nodule 3mm  CT of the chest can be obtained in one year to evaluate for stability.  Patient should have colonoscopy as outpatient, once his acute presentation subsides.  Please follow up with you pcp and gastroenterologist  within 1 week of discharge.  Please call to make an appointment within 1 week of discharge.    Dispo: Home

## 2018-04-18 NOTE — CHART NOTE - NSCHARTNOTEFT_GEN_A_CORE
Had a second conversation w patient reiterating our opinion and the opinion of Dr. Franz that Remicade would be beneficial and that his disease is uncontrolled at the present time  the patient continues to refuse  Pt to f/u as outpt w Dr Franz

## 2018-04-18 NOTE — DISCHARGE NOTE ADULT - CARE PROVIDER_API CALL
Rohith Franz), Gastroenterology; Internal Medicine  [address] [place] [id number]  Phone: [phone]  Fax: [phone]

## 2018-04-18 NOTE — PROGRESS NOTE ADULT - ASSESSMENT
Patient is a 36 yo M w/ UC (dx 3 years PTA, currently on Humira; with history of multiple flare 2/2 medication noncompliance due to insurance issues) now presenting with abdominal pain and diarrhea likely c/w IBD flare.

## 2018-04-18 NOTE — CHART NOTE - NSCHARTNOTEFT_GEN_A_CORE
Prior auth was sent for rowasa enema called [employer] . Member ID: [id number].  Called [phone].  Faxed paper work to [phone]. Pt, GI Dr. Elizalde, and Medical Attending Dr. Fortunato nettles

## 2018-04-18 NOTE — PROGRESS NOTE ADULT - ATTENDING COMMENTS
32 minutes were spent coordinating discharge, including medication management and outpt f/u with GI.

## 2018-04-18 NOTE — DISCHARGE NOTE ADULT - CARE PLAN
Principal Discharge DX:	Ulcerative colitis  Goal:	remain stable  Assessment and plan of treatment:	Please follow up with your pcp within 1 week of discharge.  Please call to make an appointment within 1 week of discharge. Please continue Humira every Thursday as prescribed.  you are s/p colonoscopy: mod erate to severe colitis : follow up pathology results----------------------  Please follow up with your gastroenterologist within 1 week of discharge.  Please call to make an appointment.  Stool culture x 2: neg; ova and parasites x 2 neg: GI PCR: neg; f/u stool viral, and 3rd ova ans parasite and stool culture results.Patient should have colonoscopy as outpatient, once his acute presentation subsides.  Secondary Diagnosis:	CORNELL (acute kidney injury)  Goal:	remain stable  Assessment and plan of treatment:	Please follow up with your pcp within 1 week of discharge.  Please call to make an appointment within 1 week of discharge.  Please monitor your creatinine within 1 week of discharge.  Secondary Diagnosis:	Anemia  Goal:	remain stable  Assessment and plan of treatment:	Please follow up with your pcp within 1 week of discharge.  Please call to make an appointment within 1 week of discharge.- Fe studies suggestive of mild Fe deficiency anemia; will monitor for now and not start Fe supplementation in setting of acute IBD flare. CBC stable.  Continue to monitor as an outpt.  Secondary Diagnosis:	Cough  Goal:	remain free of cough  Assessment and plan of treatment:	Please continue tessalon perles as prescribed.  Secondary Diagnosis:	Nausea & vomiting  Goal:	resolved  Assessment and plan of treatment:	Zofran as needed.  Please follow up with your gastroenterologist within 1 week of discharge.  Please call to make an appointment within 1 week of discharge. Principal Discharge DX:	Ulcerative colitis  Goal:	remain stable  Assessment and plan of treatment:	Please follow up with your pcp within 1 week of discharge.  Please call to make an appointment within 1 week of discharge. Please continue Humira every Thursday as prescribed.  you are s/p colonoscopy: mod erate to severe colitis : follow up pathology results----------------------  Please follow up with your gastroenterologist within 1 week of discharge.  Please call to make an appointment.  Stool culture x 2: neg; ova and parasites x 2 neg: GI PCR: neg; f/u stool viral, and 3rd ova ans parasite and stool culture results.Patient should have colonoscopy as outpatient, once his acute presentation subsides.  You may follow with GI clinic here at [facility] [phone].  Please call to make an appointment  Secondary Diagnosis:	CORNELL (acute kidney injury)  Goal:	remain stable  Assessment and plan of treatment:	Please follow up with your pcp within 1 week of discharge.  Please call to make an appointment within 1 week of discharge.  Please monitor your creatinine within 1 week of discharge.  Secondary Diagnosis:	Anemia  Goal:	remain stable  Assessment and plan of treatment:	Please follow up with your pcp within 1 week of discharge.  Please call to make an appointment within 1 week of discharge.- Fe studies suggestive of mild Fe deficiency anemia; will monitor for now and not start Fe supplementation in setting of acute IBD flare. CBC stable.  Continue to monitor as an outpt.  Secondary Diagnosis:	Cough  Goal:	remain free of cough  Assessment and plan of treatment:	Please continue tessalon perles as prescribed.  Secondary Diagnosis:	Nausea & vomiting  Goal:	resolved  Assessment and plan of treatment:	Zofran as needed.  Please follow up with your gastroenterologist within 1 week of discharge.  Please call to make an appointment within 1 week of discharge. Principal Discharge DX:	Ulcerative colitis  Goal:	remain stable  Assessment and plan of treatment:	Please follow up with your pcp within 1 week of discharge.  Please call to make an appointment within 1 week of discharge. Please continue Humira every Thursday as prescribed.  you are s/p colonoscopy: mod erate to severe colitis : follow up pathology results----------------------  Please follow up with your gastroenterologist within 1 week of discharge.  Please call to make an appointment.  Stool culture x 2: neg; ova and parasites x 2 neg: GI PCR: neg; f/u stool viral, and 3rd ova ans parasite and stool culture results.Patient should have colonoscopy as outpatient, once his acute presentation subsides.  You may follow with GI clinic here at [facility] [phone].  Please call to make an appointment  Please continue lialda and rowasa as prescribed: prior auth sent for rowasa enemas  Secondary Diagnosis:	CORNELL (acute kidney injury)  Goal:	remain stable  Assessment and plan of treatment:	Please follow up with your pcp within 1 week of discharge.  Please call to make an appointment within 1 week of discharge.  Please monitor your creatinine within 1 week of discharge.  Secondary Diagnosis:	Anemia  Goal:	remain stable  Assessment and plan of treatment:	Please follow up with your pcp within 1 week of discharge.  Please call to make an appointment within 1 week of discharge.- Fe studies suggestive of mild Fe deficiency anemia; will monitor for now and not start Fe supplementation in setting of acute IBD flare. CBC stable.  Continue to monitor as an outpt.  Secondary Diagnosis:	Cough  Goal:	remain free of cough  Assessment and plan of treatment:	Please continue tessalon perles as prescribed.  Secondary Diagnosis:	Nausea & vomiting  Goal:	resolved  Assessment and plan of treatment:	Zofran as needed.  Please follow up with your gastroenterologist within 1 week of discharge.  Please call to make an appointment within 1 week of discharge.

## 2018-04-18 NOTE — PROGRESS NOTE ADULT - SUBJECTIVE AND OBJECTIVE BOX
Patient is a 37y old  Male who presents with a chief complaint of Cough and Diarrhea (14 Apr 2018 11:29)      SUBJECTIVE / OVERNIGHT EVENTS: Pt feels like he's improving.  No longer with bloody stools, down to 2 BMs today.   Feels that the Humira has been working and there may have been a medication delivery error (i.e. he didn't inject self properly last week), which is why he developed a flare.  Pt does NOT want to pursue Remicade at this time.       MEDICATIONS  (STANDING):  lactobacillus acidophilus 1 Tablet(s) Oral at bedtime  mesalamine DR Capsule 1200 milliGRAM(s) Oral every 6 hours  mesalamine Suppository 1000 milliGRAM(s) Rectal at bedtime  pantoprazole    Tablet 40 milliGRAM(s) Oral before breakfast    MEDICATIONS  (PRN):  acetaminophen   Tablet. 650 milliGRAM(s) Oral every 6 hours PRN Mild Pain (1 - 3)  benzonatate 100 milliGRAM(s) Oral every 8 hours PRN Cough  ondansetron Injectable 4 milliGRAM(s) IV Push every 8 hours PRN Nausea and/or Vomiting      Vital Signs Last 24 Hrs  T(C): 36.7 (18 Apr 2018 04:51), Max: 37.1 (17 Apr 2018 13:01)  T(F): 98.1 (18 Apr 2018 04:51), Max: 98.8 (17 Apr 2018 13:01)  HR: 82 (18 Apr 2018 04:51) (82 - 96)  BP: 123/88 (18 Apr 2018 04:51) (114/89 - 130/97)  BP(mean): --  RR: 18 (18 Apr 2018 04:51) (16 - 18)  SpO2: 100% (18 Apr 2018 04:51) (100% - 100%)  CAPILLARY BLOOD GLUCOSE        PHYSICAL EXAM  GENERAL: NAD, well-developed  HEAD:  Atraumatic, Normocephalic  EYES: EOMI, PERRLA, conjunctiva and sclera clear  NECK: Supple, No JVD  CHEST/LUNG: Clear to auscultation bilaterally; No wheeze  HEART: Regular rate and rhythm; No murmurs, rubs, or gallops  ABDOMEN: Soft, Nontender, Nondistended; Bowel sounds present  EXTREMITIES:  2+ Peripheral Pulses, No clubbing, cyanosis, or edema  PSYCH: AAOx3  SKIN: No rashes or lesions    LABS:                        11.1   13.21 )-----------( 351      ( 18 Apr 2018 05:00 )             35.0     04-18    138  |  103  |  7   ----------------------------<  98  3.6   |  22  |  1.46<H>    Ca    8.3<L>      18 Apr 2018 05:00  Phos  3.4     04-18  Mg     1.9     04-18    TPro  6.6  /  Alb  3.0<L>  /  TBili  0.3  /  DBili  x   /  AST  21  /  ALT  14  /  AlkPhos  52  04-18        Consultant(s) Notes Reviewed:  GI     Care Discussed with Consultants/Other Providers: GI Dr. Elizalde

## 2018-04-18 NOTE — DISCHARGE NOTE ADULT - PLAN OF CARE
remain stable Please follow up with your pcp within 1 week of discharge.  Please call to make an appointment within 1 week of discharge. Please continue Humira every Thursday as prescribed.  you are s/p colonoscopy: mod erate to severe colitis : follow up pathology results----------------------  Please follow up with your gastroenterologist within 1 week of discharge.  Please call to make an appointment.  Stool culture x 2: neg; ova and parasites x 2 neg: GI PCR: neg; f/u stool viral, and 3rd ova ans parasite and stool culture results.Patient should have colonoscopy as outpatient, once his acute presentation subsides. Please follow up with your pcp within 1 week of discharge.  Please call to make an appointment within 1 week of discharge.  Please monitor your creatinine within 1 week of discharge. Please follow up with your pcp within 1 week of discharge.  Please call to make an appointment within 1 week of discharge.- Fe studies suggestive of mild Fe deficiency anemia; will monitor for now and not start Fe supplementation in setting of acute IBD flare. CBC stable.  Continue to monitor as an outpt. remain free of cough Please continue tessalon perles as prescribed. resolved Zofran as needed.  Please follow up with your gastroenterologist within 1 week of discharge.  Please call to make an appointment within 1 week of discharge. Please follow up with your pcp within 1 week of discharge.  Please call to make an appointment within 1 week of discharge. Please continue Humira every Thursday as prescribed.  you are s/p colonoscopy: mod erate to severe colitis : follow up pathology results----------------------  Please follow up with your gastroenterologist within 1 week of discharge.  Please call to make an appointment.  Stool culture x 2: neg; ova and parasites x 2 neg: GI PCR: neg; f/u stool viral, and 3rd ova ans parasite and stool culture results.Patient should have colonoscopy as outpatient, once his acute presentation subsides.  You may follow with GI clinic here at [facility] [phone].  Please call to make an appointment Please follow up with your pcp within 1 week of discharge.  Please call to make an appointment within 1 week of discharge. Please continue Humira every Thursday as prescribed.  you are s/p colonoscopy: mod erate to severe colitis : follow up pathology results----------------------  Please follow up with your gastroenterologist within 1 week of discharge.  Please call to make an appointment.  Stool culture x 2: neg; ova and parasites x 2 neg: GI PCR: neg; f/u stool viral, and 3rd ova ans parasite and stool culture results.Patient should have colonoscopy as outpatient, once his acute presentation subsides.  You may follow with GI clinic here at [facility] [phone].  Please call to make an appointment  Please continue lialda and rowasa as prescribed: prior auth sent for rowasa enemas

## 2018-04-19 LAB
BACTERIA BLD CULT: SIGNIFICANT CHANGE UP
BACTERIA BLD CULT: SIGNIFICANT CHANGE UP
BACTERIA STL CULT: SIGNIFICANT CHANGE UP

## 2018-04-20 LAB — BACTERIA STL CULT: SIGNIFICANT CHANGE UP

## 2018-04-25 LAB
O+P SPEC CONC: SIGNIFICANT CHANGE UP
SPECIMEN SOURCE: SIGNIFICANT CHANGE UP
TRI STN SPEC: SIGNIFICANT CHANGE UP
VIRUS SPEC CULT: SIGNIFICANT CHANGE UP

## 2018-04-26 LAB — MISCELLANEOUS - CHEM: SIGNIFICANT CHANGE UP

## 2018-05-17 ENCOUNTER — RX RENEWAL (OUTPATIENT)
Age: 38
End: 2018-05-17

## 2018-05-22 ENCOUNTER — CHART COPY (OUTPATIENT)
Age: 38
End: 2018-05-22

## 2018-06-22 ENCOUNTER — MESSAGE (OUTPATIENT)
Age: 38
End: 2018-06-22

## 2018-06-25 ENCOUNTER — CHART COPY (OUTPATIENT)
Age: 38
End: 2018-06-25

## 2018-08-06 ENCOUNTER — CHART COPY (OUTPATIENT)
Age: 38
End: 2018-08-06

## 2018-09-26 ENCOUNTER — CHART COPY (OUTPATIENT)
Age: 38
End: 2018-09-26

## 2018-10-23 ENCOUNTER — LABORATORY RESULT (OUTPATIENT)
Age: 38
End: 2018-10-23

## 2018-10-23 ENCOUNTER — APPOINTMENT (OUTPATIENT)
Dept: GASTROENTEROLOGY | Facility: CLINIC | Age: 38
End: 2018-10-23
Payer: MEDICAID

## 2018-10-23 VITALS
TEMPERATURE: 97.6 F | RESPIRATION RATE: 14 BRPM | HEART RATE: 120 BPM | BODY MASS INDEX: 25.99 KG/M2 | OXYGEN SATURATION: 99 % | HEIGHT: 65 IN | WEIGHT: 156 LBS | DIASTOLIC BLOOD PRESSURE: 68 MMHG | SYSTOLIC BLOOD PRESSURE: 114 MMHG

## 2018-10-23 PROCEDURE — 99215 OFFICE O/P EST HI 40 MIN: CPT

## 2018-10-24 LAB
ALBUMIN SERPL ELPH-MCNC: 4 G/DL
ALP BLD-CCNC: 85 U/L
ALT SERPL-CCNC: 13 U/L
ANION GAP SERPL CALC-SCNC: 18 MMOL/L
AST SERPL-CCNC: 21 U/L
BASOPHILS # BLD AUTO: 0 K/UL
BASOPHILS NFR BLD AUTO: 0 %
BILIRUB SERPL-MCNC: 0.2 MG/DL
BUN SERPL-MCNC: 12 MG/DL
CALCIUM SERPL-MCNC: 9.3 MG/DL
CHLORIDE SERPL-SCNC: 100 MMOL/L
CO2 SERPL-SCNC: 21 MMOL/L
CREAT SERPL-MCNC: 1.42 MG/DL
CRP SERPL-MCNC: 1.56 MG/DL
EOSINOPHIL # BLD AUTO: 0 K/UL
EOSINOPHIL NFR BLD AUTO: 0 %
GLUCOSE SERPL-MCNC: 91 MG/DL
HBV SURFACE AB SER QL: ABNORMAL
HBV SURFACE AG SER QL: NONREACTIVE
HCT VFR BLD CALC: 34.1 %
HGB BLD-MCNC: 10.7 G/DL
LYMPHOCYTES # BLD AUTO: 1.74 K/UL
LYMPHOCYTES NFR BLD AUTO: 14.7 %
MAN DIFF?: NORMAL
MCHC RBC-ENTMCNC: 21.3 PG
MCHC RBC-ENTMCNC: 31.4 GM/DL
MCV RBC AUTO: 67.9 FL
MONOCYTES # BLD AUTO: 2.03 K/UL
MONOCYTES NFR BLD AUTO: 17.2 %
NEUTROPHILS # BLD AUTO: 7.84 K/UL
NEUTROPHILS NFR BLD AUTO: 65.5 %
PLATELET # BLD AUTO: 346 K/UL
POTASSIUM SERPL-SCNC: 4.1 MMOL/L
PROT SERPL-MCNC: 8.1 G/DL
RBC # BLD: 5.02 M/UL
RBC # FLD: 19.1 %
SODIUM SERPL-SCNC: 139 MMOL/L
WBC # FLD AUTO: 11.81 K/UL

## 2018-10-25 LAB
M TB IFN-G BLD-IMP: NEGATIVE
QUANTIFERON TB PLUS MITOGEN MINUS NIL: >10 IU/ML
QUANTIFERON TB PLUS NIL: 0.25 IU/ML
QUANTIFERON TB PLUS TB1 MINUS NIL: -0.04 IU/ML
QUANTIFERON TB PLUS TB2 MINUS NIL: -0.04 IU/ML

## 2018-10-25 NOTE — ED ADULT NURSE NOTE - NS ED NURSE RECORD ANOTHER HT AND WT
No 1. Continue IV PCA while NPO  2. Offer Tylenol 650-975mg PO q6-8hrs PRN mild pain  - Oxycodone 5/10 mg PO q6hrs PRN moderate/severe pain once PCA dc'd  3. Encourage activity  Pain Service signed off  [phone]

## 2018-10-29 NOTE — DIETITIAN INITIAL EVALUATION ADULT. - SOURCE
Cryotherapy    Liquid Nitrogen - \"freeze\" (Cryotherapy)  Your doctor has treated your skin lesions with a very cold substance. The liquid nitrogen is so cold that it may feel like the skin is burning during application. A clear blister or blood blister may form after treatment and may later form a scab. Leave the area alone. Usually this scab will fall of within 1-2 weeks. The area should be kept clean and can be covered with Vaseline and a Band-Aid if needed. If a large blister develops it is ok to use a clean needle to gently pop the blister. Please call our office with any concerns at [phone]. Eczema Instructions    The medicines and treatments used to take care of your eczema may change depending on the condition of the skin. Eczema flare-ups may come and go. We cannot cure eczema, but we can help control it with these treatments. Baths:  1-2 times a day with a mild soap such as Dove for Sensitive Skin, Cetaphil Cleanser, Cerave Cleanser, Aquaphor Wash or Vanicream Bar. Apply moisturizer within 3 minutes of patting dry. To prevent infection, your doctor may recommend \"swimming pool baths. \" To create a swimming pool bath, mix one-quarter cup of household bleach into a bathtub half full of water. Bathe normally, soaking for a total of 10-15 minutes. Alternatively, mix one teaspoon of household bleach into one gallon of water to create a sponge bath solution. The dilute bleach solution mimics swimming pool water and is safe for all areas of skin, including the face. Medicines Prescribed by your Doctor    These medications should only be put on the eczema that you can see or feel. Eczema patches are red, rough, thickened or itchy. Once the skin looks and feels normal stop the medicated creams and ointments. These medications are chosen based on the location and thickness of your eczema. We always want to use the weakest medicated creams and ointments that will control your eczema.
chart, RN./patient/other (specify)

## 2018-10-30 LAB
ADALIMUMAB AB SERPL-MCNC: 34 NG/ML
ADALIMUMAB SERPL-MCNC: 13 UG/ML

## 2018-10-31 NOTE — PROGRESS NOTE ADULT - PROBLEM SELECTOR PLAN 5
Detail Level: Detailed
- DVT ppx  - SCDs for now
DVT ppx  - SCDs for now given active bleeding

## 2018-11-26 ENCOUNTER — RX RENEWAL (OUTPATIENT)
Age: 38
End: 2018-11-26

## 2019-01-18 ENCOUNTER — APPOINTMENT (OUTPATIENT)
Dept: RHEUMATOLOGY | Facility: CLINIC | Age: 39
End: 2019-01-18
Payer: MEDICAID

## 2019-01-18 PROCEDURE — 96413 CHEMO IV INFUSION 1 HR: CPT

## 2019-01-23 ENCOUNTER — MEDICATION RENEWAL (OUTPATIENT)
Age: 39
End: 2019-01-23

## 2019-01-23 DIAGNOSIS — R11.0 NAUSEA: ICD-10-CM

## 2019-01-23 RX ORDER — ADALIMUMAB 40MG/0.8ML
40 KIT SUBCUTANEOUS
Qty: 4 | Refills: 6 | Status: DISCONTINUED | COMMUNITY
Start: 2017-10-04 | End: 2019-01-23

## 2019-01-23 RX ORDER — ONDANSETRON 4 MG/1
4 TABLET ORAL EVERY 8 HOURS
Qty: 18 | Refills: 0 | Status: ACTIVE | COMMUNITY
Start: 2019-01-23 | End: 1900-01-01

## 2019-01-23 RX ORDER — ADALIMUMAB 40MG/0.8ML
40 KIT SUBCUTANEOUS
Qty: 2 | Refills: 6 | Status: DISCONTINUED | COMMUNITY
Start: 2017-10-30 | End: 2019-01-23

## 2019-01-31 ENCOUNTER — APPOINTMENT (OUTPATIENT)
Dept: RHEUMATOLOGY | Facility: CLINIC | Age: 39
End: 2019-01-31
Payer: MEDICAID

## 2019-01-31 PROCEDURE — 36415 COLL VENOUS BLD VENIPUNCTURE: CPT

## 2019-01-31 PROCEDURE — 96413 CHEMO IV INFUSION 1 HR: CPT

## 2019-02-06 ENCOUNTER — MESSAGE (OUTPATIENT)
Age: 39
End: 2019-02-06

## 2019-02-07 ENCOUNTER — MESSAGE (OUTPATIENT)
Age: 39
End: 2019-02-07

## 2019-02-07 ENCOUNTER — INPATIENT (INPATIENT)
Facility: HOSPITAL | Age: 39
LOS: 9 days | Discharge: HOME | DRG: 385 | End: 2019-02-17
Attending: INTERNAL MEDICINE | Admitting: INTERNAL MEDICINE
Payer: MEDICAID

## 2019-02-07 VITALS
RESPIRATION RATE: 20 BRPM | HEART RATE: 118 BPM | WEIGHT: 130.07 LBS | DIASTOLIC BLOOD PRESSURE: 81 MMHG | TEMPERATURE: 98 F | SYSTOLIC BLOOD PRESSURE: 121 MMHG | OXYGEN SATURATION: 99 % | HEIGHT: 65 IN

## 2019-02-07 DIAGNOSIS — K92.2 GASTROINTESTINAL HEMORRHAGE, UNSPECIFIED: ICD-10-CM

## 2019-02-07 DIAGNOSIS — K51.90 ULCERATIVE COLITIS, UNSPECIFIED, WITHOUT COMPLICATIONS: ICD-10-CM

## 2019-02-07 DIAGNOSIS — Z29.9 ENCOUNTER FOR PROPHYLACTIC MEASURES, UNSPECIFIED: ICD-10-CM

## 2019-02-07 DIAGNOSIS — N17.9 ACUTE KIDNEY FAILURE, UNSPECIFIED: ICD-10-CM

## 2019-02-07 LAB
ANION GAP SERPL CALC-SCNC: 15 MMOL/L — SIGNIFICANT CHANGE UP (ref 5–17)
BASOPHILS # BLD AUTO: 0.1 K/UL — SIGNIFICANT CHANGE UP (ref 0–0.2)
BASOPHILS NFR BLD AUTO: 0.6 % — SIGNIFICANT CHANGE UP (ref 0–2)
BUN SERPL-MCNC: 8 MG/DL — SIGNIFICANT CHANGE UP (ref 7–23)
CALCIUM SERPL-MCNC: 8.7 MG/DL — SIGNIFICANT CHANGE UP (ref 8.4–10.5)
CHLORIDE SERPL-SCNC: 101 MMOL/L — SIGNIFICANT CHANGE UP (ref 96–108)
CO2 SERPL-SCNC: 22 MMOL/L — SIGNIFICANT CHANGE UP (ref 22–31)
CREAT SERPL-MCNC: 1.37 MG/DL — HIGH (ref 0.5–1.3)
EOSINOPHIL # BLD AUTO: 0.1 K/UL — SIGNIFICANT CHANGE UP (ref 0–0.5)
EOSINOPHIL NFR BLD AUTO: 0.9 % — SIGNIFICANT CHANGE UP (ref 0–6)
GLUCOSE SERPL-MCNC: 94 MG/DL — SIGNIFICANT CHANGE UP (ref 70–99)
HCT VFR BLD CALC: 31 % — LOW (ref 39–50)
HGB BLD-MCNC: 9.4 G/DL — LOW (ref 13–17)
LYMPHOCYTES # BLD AUTO: 35.3 % — SIGNIFICANT CHANGE UP (ref 13–44)
LYMPHOCYTES # BLD AUTO: 4 K/UL — HIGH (ref 1–3.3)
MCHC RBC-ENTMCNC: 19.2 PG — LOW (ref 27–34)
MCHC RBC-ENTMCNC: 30.4 GM/DL — LOW (ref 32–36)
MCV RBC AUTO: 63.3 FL — LOW (ref 80–100)
MONOCYTES # BLD AUTO: 1 K/UL — HIGH (ref 0–0.9)
MONOCYTES NFR BLD AUTO: 9.1 % — SIGNIFICANT CHANGE UP (ref 2–14)
NEUTROPHILS # BLD AUTO: 6.2 K/UL — SIGNIFICANT CHANGE UP (ref 1.8–7.4)
NEUTROPHILS NFR BLD AUTO: 54.1 % — SIGNIFICANT CHANGE UP (ref 43–77)
PLATELET # BLD AUTO: 454 K/UL — HIGH (ref 150–400)
POTASSIUM SERPL-MCNC: 4.1 MMOL/L — SIGNIFICANT CHANGE UP (ref 3.5–5.3)
POTASSIUM SERPL-SCNC: 4.1 MMOL/L — SIGNIFICANT CHANGE UP (ref 3.5–5.3)
RBC # BLD: 4.9 M/UL — SIGNIFICANT CHANGE UP (ref 4.2–5.8)
RBC # FLD: 17.4 % — HIGH (ref 10.3–14.5)
SODIUM SERPL-SCNC: 138 MMOL/L — SIGNIFICANT CHANGE UP (ref 135–145)
WBC # BLD: 11.4 K/UL — HIGH (ref 3.8–10.5)
WBC # FLD AUTO: 11.4 K/UL — HIGH (ref 3.8–10.5)

## 2019-02-07 PROCEDURE — 99285 EMERGENCY DEPT VISIT HI MDM: CPT

## 2019-02-07 PROCEDURE — 99283 EMERGENCY DEPT VISIT LOW MDM: CPT

## 2019-02-07 RX ORDER — SODIUM CHLORIDE 9 MG/ML
1000 INJECTION, SOLUTION INTRAVENOUS
Qty: 0 | Refills: 0 | Status: DISCONTINUED | OUTPATIENT
Start: 2019-02-07 | End: 2019-02-13

## 2019-02-07 RX ORDER — PANTOPRAZOLE SODIUM 20 MG/1
40 TABLET, DELAYED RELEASE ORAL
Qty: 0 | Refills: 0 | Status: DISCONTINUED | OUTPATIENT
Start: 2019-02-07 | End: 2019-02-17

## 2019-02-07 RX ORDER — SODIUM CHLORIDE 9 MG/ML
1000 INJECTION INTRAMUSCULAR; INTRAVENOUS; SUBCUTANEOUS ONCE
Qty: 0 | Refills: 0 | Status: COMPLETED | OUTPATIENT
Start: 2019-02-07 | End: 2019-02-07

## 2019-02-07 RX ADMIN — SODIUM CHLORIDE 1000 MILLILITER(S): 9 INJECTION INTRAMUSCULAR; INTRAVENOUS; SUBCUTANEOUS at 19:10

## 2019-02-07 RX ADMIN — PANTOPRAZOLE SODIUM 40 MILLIGRAM(S): 20 TABLET, DELAYED RELEASE ORAL at 20:23

## 2019-02-07 RX ADMIN — SODIUM CHLORIDE 125 MILLILITER(S): 9 INJECTION, SOLUTION INTRAVENOUS at 20:23

## 2019-02-07 RX ADMIN — SODIUM CHLORIDE 4000 MILLILITER(S): 9 INJECTION INTRAMUSCULAR; INTRAVENOUS; SUBCUTANEOUS at 17:45

## 2019-02-07 NOTE — ED PROVIDER NOTE - OBJECTIVE STATEMENT
Reno MILLS MD PGY1: 38 M PMH UC x 5 years. Patient of Dr Franz referred to ED for admission for stabilization by GI on UC regimen and for flex sig in  the morning .Patient has been having multiple bloody bowel movement over the past few weeks (18 in the past three days) and has been resistant to multiple therapies for UC.

## 2019-02-07 NOTE — H&P ADULT - HISTORY OF PRESENT ILLNESS
Patient is a 38 M PMH UC x 5 years. Patient of Dr Franz referred to ED for admission for stabilization by GI on UC regimen and for flex sig in  the morning .Patient has been having multiple bloody bowel movement over the past few weeks (18 in the past three days) and has been resistant to multiple therapies for UC.

## 2019-02-07 NOTE — ED ADULT NURSE NOTE - OBJECTIVE STATEMENT
38 year old male with Hx of crohnns  presents to the ED complaining of bloody frequent diarrhea x 14-16 times a day. Pt was switched to ativia from humera in late January and has noticed an increase in bowel movement. Pt is A&O x 4, VSS, afebrile, ambulating independently. Pt denies fever, chills, NVD, SOB, or chest pain. 20GIV placed in , labs drawn, bed in low position, safety measures in place. Pt well appearing, speaking clearly.

## 2019-02-07 NOTE — H&P ADULT - NSHPLABSRESULTS_GEN_ALL_CORE
9.4    11.4  )-----------( 454      ( 07 Feb 2019 18:01 )             31.0               02-07    138  |  101  |  8   ----------------------------<  94  4.1   |  22  |  1.37<H>    Ca    8.7      07 Feb 2019 18:01

## 2019-02-07 NOTE — H&P ADULT - NSHPREVIEWOFSYSTEMS_GEN_ALL_CORE
REVIEW OF SYSTEMS:    CONSTITUTIONAL: No weakness, fevers or chills  EYES/ENT: No visual changes;  No vertigo or throat pain   NECK: No pain or stiffness  RESPIRATORY: No cough, wheezing, hemoptysis; No shortness of breath  CARDIOVASCULAR: No chest pain or palpitations  GASTROINTESTINAL: bloody BM   GENITOURINARY: No dysuria, frequency or hematuria  NEUROLOGICAL: No numbness or weakness  SKIN: No itching, burning, rashes, or lesions   All other review of systems is negative unless indicated above.

## 2019-02-07 NOTE — ED ADULT NURSE NOTE - NSIMPLEMENTINTERV_GEN_ALL_ED
Implemented All Universal Safety Interventions:  [place] to call system. Call bell, personal items and telephone within reach. Instruct patient to call for assistance. Room bathroom lighting operational. Non-slip footwear when patient is off stretcher. Physically safe environment: no spills, clutter or unnecessary equipment. Stretcher in lowest position, wheels locked, appropriate side rails in place.

## 2019-02-07 NOTE — ED PROVIDER NOTE - MEDICAL DECISION MAKING DETAILS
Attending MD Alvarenga: 38M with ho UC recently switched to Entyvio presents with acute on chronic bloody diarrhea, reportedly referred into ED by primary GI physician  for further evaluation of poorly controlled UC. Benign abdomen, nontoxic appearing, likely ongoing UC flare. Plan for labs, IV crystalloid, discussion with referring GI physician to determine ongoing treatment plan. Do not feel cross sectional imaging warranted at this juncture in this patient

## 2019-02-07 NOTE — ED PROVIDER NOTE - PHYSICAL EXAMINATION
Attending MD Lyle:    Gen:  no distress, sitting up in stretcher, oriented x 3, moist mucous membranes  Neck: supple, no swelling, trachea midline  CV: heart with reg rhythm, no obvious murmur appreciated   Resp: CTAB, breathing comfortably  Abd: soft, NT, ND  Extremities: extremities warm to the touch, no peripheral edema   Msk: no extremity deformities or bony tenderness  Pysch: appropriate affect    Neuro: moves all extremities spontaneously, no gross motor or sensory deficits
No

## 2019-02-07 NOTE — ED PROVIDER NOTE - ATTENDING CONTRIBUTION TO CARE
Attending MD Alvarenga:  I personally have seen and examined this patient.  Resident note reviewed and agree on plan of care and except where noted.  See HPI, PE, and MDM for details.

## 2019-02-07 NOTE — ED ADULT NURSE REASSESSMENT NOTE - NS ED NURSE REASSESS COMMENT FT1
Report received from FAMILIA Sharpe. Pt resting comfortably with family at bedside. Awaiting bed assignment. VSS. Safety maintained at all times, bed in lowest position, call bell in reach. Will continue to monitor closely.

## 2019-02-07 NOTE — H&P ADULT - NSHPPHYSICALEXAM_GEN_ALL_CORE
Vital Signs Last 24 Hrs  T(C): 36.9 (07 Feb 2019 16:16), Max: 36.9 (07 Feb 2019 16:16)  T(F): 98.5 (07 Feb 2019 16:16), Max: 98.5 (07 Feb 2019 16:16)  HR: 118 (07 Feb 2019 16:16) (118 - 118)  BP: 121/81 (07 Feb 2019 16:16) (121/81 - 121/81)  BP(mean): --  RR: 20 (07 Feb 2019 16:16) (20 - 20)  SpO2: 99% (07 Feb 2019 16:16) (99% - 99%)    Appearance: Normal	  HEENT:   Normal oral mucosa, PERRL, EOMI	  Lymphatic: No lymphadenopathy , no edema  Cardiovascular: Normal S1 S2, No JVD, No murmurs , Peripheral pulses palpable 2+ bilaterally  Respiratory: Lungs clear to auscultation, normal effort 	  Gastrointestinal:  Soft, Non-tender, + BS	  Skin: No rashes, No ecchymoses, No cyanosis, warm to touch  Musculoskeletal: Normal range of motion, normal strength  Psychiatry:  Mood & affect appropriate  Ext: No edema

## 2019-02-08 ENCOUNTER — RESULT REVIEW (OUTPATIENT)
Age: 39
End: 2019-02-08

## 2019-02-08 LAB
ALBUMIN SERPL ELPH-MCNC: 3.8 G/DL — SIGNIFICANT CHANGE UP (ref 3.3–5)
ALP SERPL-CCNC: 72 U/L — SIGNIFICANT CHANGE UP (ref 40–120)
ALT FLD-CCNC: 8 U/L — LOW (ref 10–45)
ANION GAP SERPL CALC-SCNC: 13 MMOL/L — SIGNIFICANT CHANGE UP (ref 5–17)
AST SERPL-CCNC: 9 U/L — LOW (ref 10–40)
BILIRUB SERPL-MCNC: 0.3 MG/DL — SIGNIFICANT CHANGE UP (ref 0.2–1.2)
BUN SERPL-MCNC: 5 MG/DL — LOW (ref 7–23)
C DIFF GDH STL QL: NEGATIVE — SIGNIFICANT CHANGE UP
C DIFF GDH STL QL: SIGNIFICANT CHANGE UP
CALCIUM SERPL-MCNC: 8.6 MG/DL — SIGNIFICANT CHANGE UP (ref 8.4–10.5)
CHLORIDE SERPL-SCNC: 104 MMOL/L — SIGNIFICANT CHANGE UP (ref 96–108)
CHOLEST SERPL-MCNC: 191 MG/DL — SIGNIFICANT CHANGE UP (ref 10–199)
CO2 SERPL-SCNC: 24 MMOL/L — SIGNIFICANT CHANGE UP (ref 22–31)
CREAT SERPL-MCNC: 1.27 MG/DL — SIGNIFICANT CHANGE UP (ref 0.5–1.3)
GLUCOSE SERPL-MCNC: 97 MG/DL — SIGNIFICANT CHANGE UP (ref 70–99)
HBA1C BLD-MCNC: 5.8 % — HIGH (ref 4–5.6)
HCT VFR BLD CALC: 28.3 % — LOW (ref 39–50)
HCT VFR BLD CALC: 30.6 % — LOW (ref 39–50)
HDLC SERPL-MCNC: 38 MG/DL — LOW
HGB BLD-MCNC: 8.9 G/DL — LOW (ref 13–17)
HGB BLD-MCNC: 9.1 G/DL — LOW (ref 13–17)
LIPID PNL WITH DIRECT LDL SERPL: 127 MG/DL — SIGNIFICANT CHANGE UP
MCHC RBC-ENTMCNC: 18.7 PG — LOW (ref 27–34)
MCHC RBC-ENTMCNC: 20.6 PG — LOW (ref 27–34)
MCHC RBC-ENTMCNC: 29.1 GM/DL — LOW (ref 32–36)
MCHC RBC-ENTMCNC: 32.1 GM/DL — SIGNIFICANT CHANGE UP (ref 32–36)
MCV RBC AUTO: 64.2 FL — LOW (ref 80–100)
MCV RBC AUTO: 64.3 FL — LOW (ref 80–100)
PLATELET # BLD AUTO: 387 K/UL — SIGNIFICANT CHANGE UP (ref 150–400)
PLATELET # BLD AUTO: 473 K/UL — HIGH (ref 150–400)
POTASSIUM SERPL-MCNC: 3.8 MMOL/L — SIGNIFICANT CHANGE UP (ref 3.5–5.3)
POTASSIUM SERPL-SCNC: 3.8 MMOL/L — SIGNIFICANT CHANGE UP (ref 3.5–5.3)
PROT SERPL-MCNC: 7.9 G/DL — SIGNIFICANT CHANGE UP (ref 6–8.3)
RBC # BLD: 4.41 M/UL — SIGNIFICANT CHANGE UP (ref 4.2–5.8)
RBC # BLD: 4.77 M/UL — SIGNIFICANT CHANGE UP (ref 4.2–5.8)
RBC # FLD: 17.3 % — HIGH (ref 10.3–14.5)
RBC # FLD: 18.6 % — HIGH (ref 10.3–14.5)
SODIUM SERPL-SCNC: 141 MMOL/L — SIGNIFICANT CHANGE UP (ref 135–145)
TOTAL CHOLESTEROL/HDL RATIO MEASUREMENT: 5 RATIO — SIGNIFICANT CHANGE UP (ref 3.4–9.6)
TRIGL SERPL-MCNC: 129 MG/DL — SIGNIFICANT CHANGE UP (ref 10–149)
WBC # BLD: 11.2 K/UL — HIGH (ref 3.8–10.5)
WBC # BLD: 13.22 K/UL — HIGH (ref 3.8–10.5)
WBC # FLD AUTO: 11.2 K/UL — HIGH (ref 3.8–10.5)
WBC # FLD AUTO: 13.22 K/UL — HIGH (ref 3.8–10.5)

## 2019-02-08 PROCEDURE — 88341 IMHCHEM/IMCYTCHM EA ADD ANTB: CPT | Mod: 26

## 2019-02-08 PROCEDURE — 88342 IMHCHEM/IMCYTCHM 1ST ANTB: CPT | Mod: 26

## 2019-02-08 PROCEDURE — 99222 1ST HOSP IP/OBS MODERATE 55: CPT | Mod: GC,25

## 2019-02-08 PROCEDURE — 45380 COLONOSCOPY AND BIOPSY: CPT | Mod: GC

## 2019-02-08 PROCEDURE — 88305 TISSUE EXAM BY PATHOLOGIST: CPT | Mod: 26

## 2019-02-08 RX ORDER — INFLUENZA VIRUS VACCINE 15; 15; 15; 15 UG/.5ML; UG/.5ML; UG/.5ML; UG/.5ML
0.5 SUSPENSION INTRAMUSCULAR ONCE
Qty: 0 | Refills: 0 | Status: DISCONTINUED | OUTPATIENT
Start: 2019-02-08 | End: 2019-02-17

## 2019-02-08 RX ADMIN — SODIUM CHLORIDE 125 MILLILITER(S): 9 INJECTION, SOLUTION INTRAVENOUS at 11:00

## 2019-02-08 RX ADMIN — Medication 20 MILLIGRAM(S): at 20:02

## 2019-02-08 RX ADMIN — SODIUM CHLORIDE 125 MILLILITER(S): 9 INJECTION, SOLUTION INTRAVENOUS at 18:52

## 2019-02-08 NOTE — CONSULT NOTE ADULT - ATTENDING COMMENTS
UC exacerbation  Flex sig severe colitis  Stool studies negative   patient agreeable with IV solumedrol

## 2019-02-08 NOTE — CONSULT NOTE ADULT - ASSESSMENT
Impression:  1)Ulcerative colitis flare s/p colonoscopy with biopsies showing severe colitis    Recommendations:  -f/u pathology from colonoscopy to r/o CMV  -will discuss with patient IV steroids verus remicade  -regular low fiber diet as tolerated, nutrition evaluation  -DVT ppx as patient is hypercoagulable with IBD    Please call with questions  Fifi Robertson  GI Fellow  Pager: [phone] Impression:  1)Ulcerative colitis flare s/p colonoscopy with biopsies showing severe colitis    Recommendations:  -f/u pathology from colonoscopy to r/o CMV  -start IV Solumedrol 20IV q8 hours (order in sunrise)  -regular low fiber diet as tolerated, nutrition evaluation  -DVT ppx as patient is hypercoagulable with IBD    Please call with questions  Fifi Robertson  GI Fellow  Pager: [phone]

## 2019-02-08 NOTE — PROGRESS NOTE ADULT - SUBJECTIVE AND OBJECTIVE BOX
Subjective: Patient seen and examined. No new events except as noted.     REVIEW OF SYSTEMS:    CONSTITUTIONAL: No weakness, fevers or chills  EYES/ENT: No visual changes;  No vertigo or throat pain   NECK: No pain or stiffness  RESPIRATORY: No cough, wheezing, hemoptysis; No shortness of breath  CARDIOVASCULAR: No chest pain or palpitations  GASTROINTESTINAL: No abdominal or epigastric pain. No nausea, vomiting, or hematemesis; No diarrhea or constipation. No melena or hematochezia.  GENITOURINARY: No dysuria, frequency or hematuria  NEUROLOGICAL: No numbness or weakness  SKIN: No itching, burning, rashes, or lesions   All other review of systems is negative unless indicated above.    MEDICATIONS:  MEDICATIONS  (STANDING):  influenza   Vaccine 0.5 milliLiter(s) IntraMuscular once  lactated ringers. 1000 milliLiter(s) (125 mL/Hr) IV Continuous <Continuous>  methylPREDNISolone sodium succinate Injectable 20 milliGRAM(s) IV Push every 8 hours  pantoprazole    Tablet 40 milliGRAM(s) Oral before breakfast      PHYSICAL EXAM:  T(C): 36.5 (02-08-19 @ 18:29), Max: 37.5 (02-08-19 @ 01:12)  HR: 97 (02-08-19 @ 19:03) (85 - 118)  BP: 113/74 (02-08-19 @ 18:29) (107/71 - 115/74)  RR: 18 (02-08-19 @ 18:29) (18 - 18)  SpO2: 100% (02-08-19 @ 18:29) (100% - 100%)  Wt(kg): --  I&O's Summary    Height (cm): 165.1 (02-08 @ 18:29)  Weight (kg): 69.6 (02-08 @ 18:29)  BMI (kg/m2): 25.5 (02-08 @ 18:29)  BSA (m2): 1.77 (02-08 @ 18:29)    Appearance: Normal	  HEENT:   Normal oral mucosa, PERRL, EOMI	  Lymphatic: No lymphadenopathy , no edema  Cardiovascular: Normal S1 S2, No JVD, No murmurs , Peripheral pulses palpable 2+ bilaterally  Respiratory: Lungs clear to auscultation, normal effort 	  Gastrointestinal:  Soft, Non-tender, + BS	  Skin: No rashes, No ecchymoses, No cyanosis, warm to touch  Musculoskeletal: Normal range of motion, normal strength  Psychiatry:  Mood & affect appropriate  Ext: No edema      All labs, Imaging and EKGs personally reviewed                           8.9    13.22 )-----------( 473      ( 08 Feb 2019 09:20 )             30.6               02-08    141  |  104  |  5<L>  ----------------------------<  97  3.8   |  24  |  1.27    Ca    8.6      08 Feb 2019 06:34    TPro  7.9  /  Alb  3.8  /  TBili  0.3  /  DBili  x   /  AST  9<L>  /  ALT  8<L>  /  AlkPhos  72  02-08

## 2019-02-08 NOTE — PROGRESS NOTE ADULT - SUBJECTIVE AND OBJECTIVE BOX
Pre-Endoscopy Evaluation      Referring Physician:                                    Procedure: Colonoscopy/flexible sigmoidoscopy    Indication for Procedure: IBD flare    Pertinent History:    Sedation by Anesthesia [ X]    PAST MEDICAL & SURGICAL HISTORY:  Ulcerative colitis without complications, unspecified location  No significant past surgical history      PMH of Gastroparesis [ ]  Gastric Surgery [ ]  Gastric Outlet Obstruction [ ]    Allergies    No Known Allergies    Intolerances        Latex allergy: [ ] yes [X ] no    Medications:MEDICATIONS  (STANDING):  influenza   Vaccine 0.5 milliLiter(s) IntraMuscular once  lactated ringers. 1000 milliLiter(s) (125 mL/Hr) IV Continuous <Continuous>  pantoprazole    Tablet 40 milliGRAM(s) Oral before breakfast    MEDICATIONS  (PRN):      Smoking: [ ] yes  [X ] no    AICD/PPM: [ ] yes   [ X] no    Pertinent lab data:                        8.9    13.22 )-----------( 473      ( 08 Feb 2019 09:20 )             30.6     02-08    141  |  104  |  5<L>  ----------------------------<  97  3.8   |  24  |  1.27    Ca    8.6      08 Feb 2019 06:34    TPro  7.9  /  Alb  3.8  /  TBili  0.3  /  DBili  x   /  AST  9<L>  /  ALT  8<L>  /  AlkPhos  72  02-08                  Physical Examination:  Daily Height in cm: 165.1 (08 Feb 2019 13:36)    Daily   Vital Signs Last 24 Hrs  T(C): 36.7 (08 Feb 2019 09:14), Max: 37.5 (08 Feb 2019 01:12)  T(F): 98.1 (08 Feb 2019 09:14), Max: 99.5 (08 Feb 2019 01:12)  HR: 85 (08 Feb 2019 09:14) (85 - 118)  BP: 115/74 (08 Feb 2019 09:14) (107/71 - 121/81)  BP(mean): --  RR: 18 (08 Feb 2019 09:14) (18 - 20)  SpO2: 100% (08 Feb 2019 09:14) (99% - 100%)    BP:                 HR:                  SPO2:               Temperature:    Constitutional: NAD    HEENT: PERRLA, EOMI,       Neck:  No JVD    Respiratory: CTAB/L    Cardiovascular: S1 and S2    Gastrointestinal: BS+, soft, NT/ND    Extremities: No peripheral edema    Neurological: A/O x 3, no focal deficits    Psychiatric: Normal mood, normal affect    : No Hamilton    Skin: No rashes    Comments:    ASA Class: I [ ]  II [ ]  III [X ]  IV [ ]    The patient is a suitable candidate for the planned procedure unless box checked [ ]  No, explain:

## 2019-02-08 NOTE — PRE-ANESTHESIA EVALUATION ADULT - NSANTHOSAYNRD_GEN_A_CORE
No. WARD screening performed.  STOP BANG Legend: 0-2 = LOW Risk; 3-4 = INTERMEDIATE Risk; 5-8 = HIGH Risk

## 2019-02-08 NOTE — CONSULT NOTE ADULT - SUBJECTIVE AND OBJECTIVE BOX
Chief Complaint:  Patient is a 38y old  Male who presents with a chief complaint of UC (08 Feb 2019 13:55)    HPI:  38M with Ulcerative colitis, last colonoscopy 2017, currently on Humira qweek presenting from home with several episodes of bloody diarrhea over the past few weeks. The patient reports up to 18 blood BM's per day associated with abdominal distension, bloating and cramping pain. He denies any nausea or vomiting. He denies fevers or chills but does note mild nasal congestion over the past week. He denies cough or dysuria. His primary GI is Dr. Franz who has discussed colectomy with him in the past but the patient refused. Last office visit in October 2018- at that time patient with symptoms concerning for lack of response to Humira.     Allergies:  No Known Allergies      Home Medications:  Humira weekly (over the past year)     Hospital Medications:  influenza   Vaccine 0.5 milliLiter(s) IntraMuscular once  lactated ringers. 1000 milliLiter(s) IV Continuous <Continuous>  pantoprazole    Tablet 40 milliGRAM(s) Oral before breakfast      PMHX/PSHX:  Ulcerative colitis without complications, unspecified location  No significant past surgical history      Family history:  No pertinent family history in first degree relatives      Social History: no tobacco, no ETOH, no IVDA     ROS:     General:  No wt loss, fevers, chills, night sweats, fatigue,   Eyes:  Good vision, no reported pain  ENT:  No sore throat, pain, runny nose, dysphagia  CV:  No pain, palpitations, hypo/hypertension  Resp:  No dyspnea, cough, tachypnea, wheezing  GI:  + pain, No nausea, No vomiting, No diarrhea, No constipation, No weight loss, No fever, No pruritis, + rectal bleeding, No tarry stools, No dysphagia,  :  No pain, bleeding, incontinence, nocturia  Muscle:  No pain, weakness  Neuro:  No weakness, tingling, memory problems  Psych:  No fatigue, insomnia, mood problems, depression  Endocrine:  No polyuria, polydipsia, cold/heat intolerance  Heme:  No petechiae, ecchymosis, easy bruisability  Skin:  No rash, tattoos, scars, edema      PHYSICAL EXAM:     GENERAL:  Appears stated age, well-groomed, well-nourished, no distress  HEENT:  NC/AT,  conjunctivae clear and pink, no thyromegaly, nodules, adenopathy, no JVD, sclera -anicteric  CHEST:  Full & symmetric excursion, no increased effort, breath sounds clear  HEART:  Regular rhythm, S1, S2, no murmur/rub/S3/S4, no abdominal bruit, no edema  ABDOMEN:  Soft, non-tender, non-distended, normoactive bowel sounds  EXTEREMITIES:  no cyanosis,clubbing or edema  SKIN:  No rash/erythema, intact   NEURO:  Alert, oriented, no asterixis, no tremor, no encephalopathy    Vital Signs:  Vital Signs Last 24 Hrs  T(C): 36.7 (08 Feb 2019 09:14), Max: 37.5 (08 Feb 2019 01:12)  T(F): 98.1 (08 Feb 2019 09:14), Max: 99.5 (08 Feb 2019 01:12)  HR: 85 (08 Feb 2019 09:14) (85 - 118)  BP: 115/74 (08 Feb 2019 09:14) (107/71 - 121/81)  BP(mean): --  RR: 18 (08 Feb 2019 09:14) (18 - 20)  SpO2: 100% (08 Feb 2019 09:14) (99% - 100%)  Daily Height in cm: 165.1 (08 Feb 2019 13:36)    Daily     LABS:                        8.9    13.22 )-----------( 473      ( 08 Feb 2019 09:20 )             30.6     Mean Cell Volume: 64.2 fl (02-08-19 @ 09:20)    02-08    141  |  104  |  5<L>  ----------------------------<  97  3.8   |  24  |  1.27    Ca    8.6      08 Feb 2019 06:34    TPro  7.9  /  Alb  3.8  /  TBili  0.3  /  DBili  x   /  AST  9<L>  /  ALT  8<L>  /  AlkPhos  72  02-08    LIVER FUNCTIONS - ( 08 Feb 2019 06:34 )  Alb: 3.8 g/dL / Pro: 7.9 g/dL / ALK PHOS: 72 U/L / ALT: 8 U/L / AST: 9 U/L / GGT: x                                       8.9    13.22 )-----------( 473      ( 08 Feb 2019 09:20 )             30.6                         9.1    11.2  )-----------( 387      ( 08 Feb 2019 01:19 )             28.3                         9.4    11.4  )-----------( 454      ( 07 Feb 2019 18:01 )             31.0     Imaging:  < from: Colonoscopy (02.08.19 @ 13:45) >  Findings:       The perianal exam findings include a skin tag.       The terminal ileum appeared normal. Estimated blood loss: none.       Inflammation characterized by erythema, friability, loss of vascularity, mucus and serpentine        ulcerations was found in a continuous and circumferential pattern from the rectum to the        cecum. Most intensive in the left colon. Mild luminal narrowing of the left colon. No sites        were spared. This was severe in severity, and when compared to previous examinations, the        findings are unchanged. Biopsies were taken with a cold forceps for histology. Biopsies were        taken with a cold forceps for histology. Estimated blood loss was minimal.                                                                                               Impression:          - Perianal skin tag found on perianal exam.                       Severe pan Ulcerative Colitis  Recommendation:      - Return patient to [facility] for ongoing care.                       - Low residue diet indefinitely.                       -Options for inpatient therapy: IV corticosteroids or Infliximab (Remicade)                       -Follow up biopsy results    < end of copied text > Chief Complaint:  Patient is a 38y old  Male who presents with a chief complaint of UC (08 Feb 2019 13:55)    HPI:  38M with Ulcerative colitis, last colonoscopy 2017, currently on Humira qweek presenting from home with several episodes of bloody diarrhea over the past few weeks. The patient reports up to 18 blood BM's per day associated with abdominal distension, bloating and cramping pain. He denies any nausea or vomiting. He denies fevers or chills but does note mild nasal congestion over the past week. He denies cough or dysuria. His primary GI is Dr. Franz who has discussed colectomy with him in the past but the patient refused. Last office visit in October 2018- at that time patient with symptoms concerning for lack of response to Humira.   C. Diff negative in ED.     Allergies:  No Known Allergies      Home Medications:  Humira weekly (over the past year)     Hospital Medications:  influenza   Vaccine 0.5 milliLiter(s) IntraMuscular once  lactated ringers. 1000 milliLiter(s) IV Continuous <Continuous>  pantoprazole    Tablet 40 milliGRAM(s) Oral before breakfast      PMHX/PSHX:  Ulcerative colitis without complications, unspecified location  No significant past surgical history      Family history:  No pertinent family history in first degree relatives      Social History: no tobacco, no ETOH, no IVDA     ROS:     General:  No wt loss, fevers, chills, night sweats, fatigue,   Eyes:  Good vision, no reported pain  ENT:  No sore throat, pain, runny nose, dysphagia  CV:  No pain, palpitations, hypo/hypertension  Resp:  No dyspnea, cough, tachypnea, wheezing  GI:  + pain, No nausea, No vomiting, No diarrhea, No constipation, No weight loss, No fever, No pruritis, + rectal bleeding, No tarry stools, No dysphagia,  :  No pain, bleeding, incontinence, nocturia  Muscle:  No pain, weakness  Neuro:  No weakness, tingling, memory problems  Psych:  No fatigue, insomnia, mood problems, depression  Endocrine:  No polyuria, polydipsia, cold/heat intolerance  Heme:  No petechiae, ecchymosis, easy bruisability  Skin:  No rash, tattoos, scars, edema      PHYSICAL EXAM:     GENERAL:  Appears stated age, well-groomed, well-nourished, no distress  HEENT:  NC/AT,  conjunctivae clear and pink, no thyromegaly, nodules, adenopathy, no JVD, sclera -anicteric  CHEST:  Full & symmetric excursion, no increased effort, breath sounds clear  HEART:  Regular rhythm, S1, S2, no murmur/rub/S3/S4, no abdominal bruit, no edema  ABDOMEN:  Soft, non-tender, non-distended, normoactive bowel sounds  EXTEREMITIES:  no cyanosis,clubbing or edema  SKIN:  No rash/erythema, intact   NEURO:  Alert, oriented, no asterixis, no tremor, no encephalopathy    Vital Signs:  Vital Signs Last 24 Hrs  T(C): 36.7 (08 Feb 2019 09:14), Max: 37.5 (08 Feb 2019 01:12)  T(F): 98.1 (08 Feb 2019 09:14), Max: 99.5 (08 Feb 2019 01:12)  HR: 85 (08 Feb 2019 09:14) (85 - 118)  BP: 115/74 (08 Feb 2019 09:14) (107/71 - 121/81)  BP(mean): --  RR: 18 (08 Feb 2019 09:14) (18 - 20)  SpO2: 100% (08 Feb 2019 09:14) (99% - 100%)  Daily Height in cm: 165.1 (08 Feb 2019 13:36)    Daily     LABS:                        8.9    13.22 )-----------( 473      ( 08 Feb 2019 09:20 )             30.6     Mean Cell Volume: 64.2 fl (02-08-19 @ 09:20)    02-08    141  |  104  |  5<L>  ----------------------------<  97  3.8   |  24  |  1.27    Ca    8.6      08 Feb 2019 06:34    TPro  7.9  /  Alb  3.8  /  TBili  0.3  /  DBili  x   /  AST  9<L>  /  ALT  8<L>  /  AlkPhos  72  02-08    LIVER FUNCTIONS - ( 08 Feb 2019 06:34 )  Alb: 3.8 g/dL / Pro: 7.9 g/dL / ALK PHOS: 72 U/L / ALT: 8 U/L / AST: 9 U/L / GGT: x                                       8.9    13.22 )-----------( 473      ( 08 Feb 2019 09:20 )             30.6                         9.1    11.2  )-----------( 387      ( 08 Feb 2019 01:19 )             28.3                         9.4    11.4  )-----------( 454      ( 07 Feb 2019 18:01 )             31.0     Imaging:  < from: Colonoscopy (02.08.19 @ 13:45) >  Findings:       The perianal exam findings include a skin tag.       The terminal ileum appeared normal. Estimated blood loss: none.       Inflammation characterized by erythema, friability, loss of vascularity, mucus and serpentine        ulcerations was found in a continuous and circumferential pattern from the rectum to the        cecum. Most intensive in the left colon. Mild luminal narrowing of the left colon. No sites        were spared. This was severe in severity, and when compared to previous examinations, the        findings are unchanged. Biopsies were taken with a cold forceps for histology. Biopsies were        taken with a cold forceps for histology. Estimated blood loss was minimal.                                                                                               Impression:          - Perianal skin tag found on perianal exam.                       Severe pan Ulcerative Colitis  Recommendation:      - Return patient to hospital [facility] for ongoing care.                       - Low residue diet indefinitely.                       -Options for inpatient therapy: IV corticosteroids or Infliximab (Remicade)                       -Follow up biopsy results    < end of copied text >

## 2019-02-08 NOTE — PROGRESS NOTE ADULT - SUBJECTIVE AND OBJECTIVE BOX
Pre-Endoscopy Evaluation      Referring Physician:  dr. anish mendez                                  Procedure: flexible sigmoidoscopy    Indication for Procedure: gib, UC flare    Pertinent History: 38y male PMH Ulcerative Colitis presented with multiple bloody bowel movements      Sedation by Anesthesia [X]    PAST MEDICAL & SURGICAL HISTORY:  Ulcerative colitis without complications, unspecified location  No significant past surgical history      PMH of Gastroparesis [ ]  Gastric Surgery [ ]  Gastric Outlet Obstruction [ ]    Allergies    No Known Allergies    Intolerances    Latex allergy: [ ] yes [X] no    Medications:MEDICATIONS  (STANDING):  influenza   Vaccine 0.5 milliLiter(s) IntraMuscular once  lactated ringers. 1000 milliLiter(s) (125 mL/Hr) IV Continuous <Continuous>  pantoprazole    Tablet 40 milliGRAM(s) Oral before breakfast    MEDICATIONS  (PRN):      Smoking: [ ] yes  [X] no    AICD/PPM: [ ] yes   [X] no    Pertinent lab data:                        8.9    13.22 )-----------( 473      ( 08 Feb 2019 09:20 )             30.6     02-08    141  |  104  |  5<L>  ----------------------------<  97  3.8   |  24  |  1.27    Ca    8.6      08 Feb 2019 06:34    TPro  7.9  /  Alb  3.8  /  TBili  0.3  /  DBili  x   /  AST  9<L>  /  ALT  8<L>  /  AlkPhos  72  02-08          Physical Examination:  Daily Height in cm: 165.1 (07 Feb 2019 16:16)    Daily   Vital Signs Last 24 Hrs  T(C): 36.7 (08 Feb 2019 09:14), Max: 37.5 (08 Feb 2019 01:12)  T(F): 98.1 (08 Feb 2019 09:14), Max: 99.5 (08 Feb 2019 01:12)  HR: 85 (08 Feb 2019 09:14) (85 - 118)  BP: 115/74 (08 Feb 2019 09:14) (107/71 - 121/81)  BP(mean): --  RR: 18 (08 Feb 2019 09:14) (18 - 20)  SpO2: 100% (08 Feb 2019 09:14) (99% - 100%)    Drug Dosing Weight  Height (cm): 165.1 (07 Feb 2019 16:16)  Weight (kg): 59 (07 Feb 2019 16:16)  BMI (kg/m2): 21.6 (07 Feb 2019 16:16)  BSA (m2): 1.65 (07 Feb 2019 16:16)    Constitutional: NAD     Neck:  No JVD    Respiratory: CTAB/L    Cardiovascular: S1 and S2    Gastrointestinal: BS+, soft, NT/ND    Extremities: No peripheral edema    Neurological: A/O x 3    : No Hamilton    Skin: No rashes    Comments:    ASA Class: I [ ]  II [X]  III [ ]  IV [ ]    The patient is a suitable candidate for the planned procedure unless box checked [ ]  No, explain:

## 2019-02-09 LAB — TSH SERPL-MCNC: 4.54 UIU/ML — HIGH (ref 0.27–4.2)

## 2019-02-09 PROCEDURE — 99232 SBSQ HOSP IP/OBS MODERATE 35: CPT | Mod: GC

## 2019-02-09 RX ORDER — HEPARIN SODIUM 5000 [USP'U]/ML
5000 INJECTION INTRAVENOUS; SUBCUTANEOUS EVERY 8 HOURS
Qty: 0 | Refills: 0 | Status: DISCONTINUED | OUTPATIENT
Start: 2019-02-09 | End: 2019-02-17

## 2019-02-09 RX ADMIN — SODIUM CHLORIDE 125 MILLILITER(S): 9 INJECTION, SOLUTION INTRAVENOUS at 20:10

## 2019-02-09 RX ADMIN — SODIUM CHLORIDE 125 MILLILITER(S): 9 INJECTION, SOLUTION INTRAVENOUS at 10:19

## 2019-02-09 RX ADMIN — Medication 20 MILLIGRAM(S): at 05:17

## 2019-02-09 RX ADMIN — Medication 1 APPLICATION(S): at 22:16

## 2019-02-09 RX ADMIN — PANTOPRAZOLE SODIUM 40 MILLIGRAM(S): 20 TABLET, DELAYED RELEASE ORAL at 06:18

## 2019-02-09 RX ADMIN — Medication 20 MILLIGRAM(S): at 11:50

## 2019-02-09 RX ADMIN — Medication 20 MILLIGRAM(S): at 20:48

## 2019-02-09 NOTE — PROGRESS NOTE ADULT - SUBJECTIVE AND OBJECTIVE BOX
Chief Complaint:  Patient is a 38y old  Male who presents with a chief complaint of UC (08 Feb 2019 21:41)      Interval Events: No events overnight.  Started on IV solumedrol    Allergies:  No Known Allergies      Hospital Medications:  influenza   Vaccine 0.5 milliLiter(s) IntraMuscular once  lactated ringers. 1000 milliLiter(s) IV Continuous <Continuous>  methylPREDNISolone sodium succinate Injectable 20 milliGRAM(s) IV Push every 8 hours  pantoprazole    Tablet 40 milliGRAM(s) Oral before breakfast      PMHX/PSHX:  Ulcerative colitis without complications, unspecified location  No significant past surgical history      Family history:  No pertinent family history in first degree relatives      ROS:     General:  No wt loss, fevers, chills, night sweats, fatigue,   Eyes:  Good vision, no reported pain  ENT:  No sore throat, pain, runny nose, dysphagia  CV:  No pain, palpitations, hypo/hypertension  Resp:  No dyspnea, cough, tachypnea, wheezing  GI:  See HPI  :  No pain, bleeding, incontinence, nocturia  Muscle:  No pain, weakness  Neuro:  No weakness, tingling, memory problems  Psych:  No fatigue, insomnia, mood problems, depression  Endocrine:  No polyuria, polydipsia, cold/heat intolerance  Heme:  No petechiae, ecchymosis, easy bruisability  Skin:  No rash, edema      PHYSICAL EXAM:     GENERAL:  NAD  HEENT:  NC/AT  CHEST:  Full & symmetric excursion, no increased effort  ABDOMEN:  Soft but TTP, nondistended  EXTREMITIES:  no cyanosis,clubbing or edema  SKIN:  No rash  NEURO:  Alert, oriented    Vital Signs:  Vital Signs Last 24 Hrs  T(C): 36.8 (09 Feb 2019 05:19), Max: 36.8 (09 Feb 2019 05:19)  T(F): 98.2 (09 Feb 2019 05:19), Max: 98.2 (09 Feb 2019 05:19)  HR: 86 (09 Feb 2019 05:19) (85 - 118)  BP: 99/65 (09 Feb 2019 05:19) (99/65 - 115/74)  BP(mean): --  RR: 18 (09 Feb 2019 05:19) (18 - 18)  SpO2: 98% (09 Feb 2019 05:19) (98% - 100%)  Daily Height in cm: 165.1 (08 Feb 2019 18:29)    Daily     LABS:                        8.9    13.22 )-----------( 473      ( 08 Feb 2019 09:20 )             30.6     02-08    141  |  104  |  5<L>  ----------------------------<  97  3.8   |  24  |  1.27    Ca    8.6      08 Feb 2019 06:34    TPro  7.9  /  Alb  3.8  /  TBili  0.3  /  DBili  x   /  AST  9<L>  /  ALT  8<L>  /  AlkPhos  72  02-08    LIVER FUNCTIONS - ( 08 Feb 2019 06:34 )  Alb: 3.8 g/dL / Pro: 7.9 g/dL / ALK PHOS: 72 U/L / ALT: 8 U/L / AST: 9 U/L / GGT: x                   Imaging:  < from: Colonoscopy (02.08.19 @ 13:45) >    [facility]  ____________________________________________________________________________________________________  Patient Name: Brayden Baumann              MRN: [id number]  Account Number: [id number]                     YOB: 1980  Room: Endoscopy Room 3                           Gender: Male  Attending MD: Rohith Franz MD                Procedure Date No Time: 2/8/2019  ____________________________________________________________________________________________________     Procedure:           Colonoscopy  Indications:         Follow-up of ulcerative colitis, For therapy of ulcerative colitis  Providers:           Rohith Franz MD, Fifi Robertson (Fellow)  Medicines:           Monitored Anesthesia Care  Complications:       No immediate complications.  ____________________________________________________________________________________________________  Procedure:           Pre-Anesthesia Assessment:                       - Immediately prior to administration of medications, the patient was                        re-assessed for adequacy to receive sedatives.                       After I obtained informed consent, the scope was passed under direct vision.                        Throughout the procedure, the patient's blood pressure, pulse, and oxygen                        saturations were monitored continuously. The Colonoscope was introduced                        through the anus and advanced to the terminal ileum. After I obtained                        informed consent, the scope was passed under direct vision. Throughout the                        procedure, the patient's blood pressure, pulse, and oxygen saturations were                        monitored continuously.The colonoscopy was performed without difficulty. The                        patient tolerated the procedure well. The quality of the bowel preparation                        was adequate.                                      Findings:       The perianal exam findings include a skin tag.       The terminal ileum appeared normal. Estimated blood loss: none.       Inflammation characterized by erythema, friability, loss of vascularity, mucus and serpentine        ulcerations was found in a continuous and circumferential pattern from the rectum to the        cecum. Most intensive in the left colon. Mild luminal narrowing of the left colon. No sites        were spared. This was severe in severity, and when compared to previous examinations, the        findings are unchanged. Biopsies were taken with a cold forceps for histology. Biopsies were        taken with a cold forceps for histology. Estimated blood loss was minimal.                                                                                               Impression:          - Perianal skin tag found on perianal exam.                       Severe pan Ulcerative Colitis  Recommendation:      - Return patient to [facility] for ongoing care.                       - Low residue diet indefinitely.                       -Options for inpatient therapy: IV corticosteroids or Infliximab (Remicade)                       -Follow up biopsy results                                                                                        Attending Participation:       I was present and participated during the entire procedure, including non-key portions.                                                             __________________  Rohith Franz MD  2/8/2019 2:25:21 PM  Number of Addenda: 0    Note Initiated On: 2/8/2019 1:45 PM    < end of copied text > Chief Complaint:  Patient is a 38y old  Male who presents with a chief complaint of UC (08 Feb 2019 21:41)      Interval Events: No events overnight.  Started on IV solumedrol.  Pt feeling better, urgency less, 3 bowel movements only.     Allergies:  No Known Allergies      Hospital Medications:  influenza   Vaccine 0.5 milliLiter(s) IntraMuscular once  lactated ringers. 1000 milliLiter(s) IV Continuous <Continuous>  methylPREDNISolone sodium succinate Injectable 20 milliGRAM(s) IV Push every 8 hours  pantoprazole    Tablet 40 milliGRAM(s) Oral before breakfast      PMHX/PSHX:  Ulcerative colitis without complications, unspecified location  No significant past surgical history      Family history:  No pertinent family history in first degree relatives      ROS:     General:  No wt loss, fevers, chills, night sweats, fatigue,   Eyes:  Good vision, no reported pain  ENT:  No sore throat, pain, runny nose, dysphagia  CV:  No pain, palpitations, hypo/hypertension  Resp:  No dyspnea, cough, tachypnea, wheezing  GI:  See HPI  :  No pain, bleeding, incontinence, nocturia  Muscle:  No pain, weakness  Neuro:  No weakness, tingling, memory problems  Psych:  No fatigue, insomnia, mood problems, depression  Endocrine:  No polyuria, polydipsia, cold/heat intolerance  Heme:  No petechiae, ecchymosis, easy bruisability  Skin:  No rash, edema      PHYSICAL EXAM:     GENERAL:  NAD  HEENT:  NC/AT  CHEST:  Full & symmetric excursion, no increased effort  ABDOMEN:  Soft but TTP, mildly distended  EXTREMITIES:  no cyanosis,clubbing or edema  SKIN:  No rash  NEURO:  Alert, oriented    Vital Signs:  Vital Signs Last 24 Hrs  T(C): 36.8 (09 Feb 2019 05:19), Max: 36.8 (09 Feb 2019 05:19)  T(F): 98.2 (09 Feb 2019 05:19), Max: 98.2 (09 Feb 2019 05:19)  HR: 86 (09 Feb 2019 05:19) (85 - 118)  BP: 99/65 (09 Feb 2019 05:19) (99/65 - 115/74)  BP(mean): --  RR: 18 (09 Feb 2019 05:19) (18 - 18)  SpO2: 98% (09 Feb 2019 05:19) (98% - 100%)  Daily Height in cm: 165.1 (08 Feb 2019 18:29)    Daily     LABS:                        8.9    13.22 )-----------( 473      ( 08 Feb 2019 09:20 )             30.6     02-08    141  |  104  |  5<L>  ----------------------------<  97  3.8   |  24  |  1.27    Ca    8.6      08 Feb 2019 06:34    TPro  7.9  /  Alb  3.8  /  TBili  0.3  /  DBili  x   /  AST  9<L>  /  ALT  8<L>  /  AlkPhos  72  02-08    LIVER FUNCTIONS - ( 08 Feb 2019 06:34 )  Alb: 3.8 g/dL / Pro: 7.9 g/dL / ALK PHOS: 72 U/L / ALT: 8 U/L / AST: 9 U/L / GGT: x                   Imaging:  < from: Colonoscopy (02.08.19 @ 13:45) >    [facility]  ____________________________________________________________________________________________________  Patient Name: Brayden Baumann              MRN: [id number]  Account Number: [id number]                     YOB: 1980  Room: Endoscopy Room 3                           Gender: Male  Attending MD: Rohith Franz MD                Procedure Date No Time: 2/8/2019  ____________________________________________________________________________________________________     Procedure:           Colonoscopy  Indications:         Follow-up of ulcerative colitis, For therapy of ulcerative colitis  Providers:           Rohith Franz MD, Fifi Robertson (Fellow)  Medicines:           Monitored Anesthesia Care  Complications:       No immediate complications.  ____________________________________________________________________________________________________  Procedure:           Pre-Anesthesia Assessment:                       - Immediately prior to administration of medications, the patient was                        re-assessed for adequacy to receive sedatives.                       After I obtained informed consent, the scope was passed under direct vision.                        Throughout the procedure, the patient's blood pressure, pulse, and oxygen                        saturations were monitored continuously. The Colonoscope was introduced                        through the anus and advanced to the terminal ileum. After I obtained                        informed consent, the scope was passed under direct vision. Throughout the                        procedure, the patient's blood pressure, pulse, and oxygen saturations were                        monitored continuously.The colonoscopy was performed without difficulty. The                        patient tolerated the procedure well. The quality of the bowel preparation                        was adequate.                                      Findings:       The perianal exam findings include a skin tag.       The terminal ileum appeared normal. Estimated blood loss: none.       Inflammation characterized by erythema, friability, loss of vascularity, mucus and serpentine        ulcerations was found in a continuous and circumferential pattern from the rectum to the        cecum. Most intensive in the left colon. Mild luminal narrowing of the left colon. No sites        were spared. This was severe in severity, and when compared to previous examinations, the        findings are unchanged. Biopsies were taken with a cold forceps for histology. Biopsies were        taken with a cold forceps for histology. Estimated blood loss was minimal.                                                                                               Impression:          - Perianal skin tag found on perianal exam.                       Severe pan Ulcerative Colitis  Recommendation:      - Return patient to [facility] for ongoing care.                       - Low residue diet indefinitely.                       -Options for inpatient therapy: IV corticosteroids or Infliximab (Remicade)                       -Follow up biopsy results                                                                                        Attending Participation:       I was present and participated during the entire procedure, including non-key portions.                                                             __________________  Rohith Franz MD  2/8/2019 2:25:21 PM  Number of Addenda: 0    Note Initiated On: 2/8/2019 1:45 PM    < end of copied text >

## 2019-02-09 NOTE — PROGRESS NOTE ADULT - SUBJECTIVE AND OBJECTIVE BOX
Subjective: Patient seen and examined. No new events except as noted.   S/P sigmoidoscopy   feeling better     REVIEW OF SYSTEMS:    CONSTITUTIONAL: No weakness, fevers or chills  EYES/ENT: No visual changes;  No vertigo or throat pain   NECK: No pain or stiffness  RESPIRATORY: No cough, wheezing, hemoptysis; No shortness of breath  CARDIOVASCULAR: No chest pain or palpitations  GASTROINTESTINAL: multiple bowel movements   GENITOURINARY: No dysuria, frequency or hematuria  NEUROLOGICAL: No numbness or weakness  SKIN: No itching, burning, rashes, or lesions   All other review of systems is negative unless indicated above.    MEDICATIONS:  MEDICATIONS  (STANDING):  heparin  Injectable 5000 Unit(s) SubCutaneous every 8 hours  influenza   Vaccine 0.5 milliLiter(s) IntraMuscular once  lactated ringers. 1000 milliLiter(s) (125 mL/Hr) IV Continuous <Continuous>  methylPREDNISolone sodium succinate Injectable 20 milliGRAM(s) IV Push every 8 hours  pantoprazole    Tablet 40 milliGRAM(s) Oral before breakfast      PHYSICAL EXAM:  T(C): 36.5 (02-09-19 @ 11:51), Max: 36.8 (02-09-19 @ 05:19)  HR: 82 (02-09-19 @ 11:51) (82 - 118)  BP: 110/71 (02-09-19 @ 11:51) (99/65 - 113/74)  RR: 18 (02-09-19 @ 11:51) (18 - 18)  SpO2: 99% (02-09-19 @ 11:51) (98% - 100%)  Wt(kg): --  I&O's Summary    09 Feb 2019 07:01  -  09 Feb 2019 16:45  --------------------------------------------------------  IN: 240 mL / OUT: 300 mL / NET: -60 mL      Height (cm): 165.1 (02-08 @ 18:29)  Weight (kg): 69.6 (02-08 @ 18:29)  BMI (kg/m2): 25.5 (02-08 @ 18:29)  BSA (m2): 1.77 (02-08 @ 18:29)    Appearance: Normal	  HEENT:   Normal oral mucosa, PERRL, EOMI	  Lymphatic: No lymphadenopathy , no edema  Cardiovascular: Normal S1 S2, No JVD, No murmurs , Peripheral pulses palpable 2+ bilaterally  Respiratory: Lungs clear to auscultation, normal effort 	  Gastrointestinal:  Soft, Non-tender, + BS	  Skin: No rashes, No ecchymoses, No cyanosis, warm to touch  Musculoskeletal: Normal range of motion, normal strength  Psychiatry:  Mood & affect appropriate  Ext: No edema      All labs, Imaging and EKGs personally reviewed                           8.9    13.22 )-----------( 473      ( 08 Feb 2019 09:20 )             30.6               02-08    141  |  104  |  5<L>  ----------------------------<  97  3.8   |  24  |  1.27    Ca    8.6      08 Feb 2019 06:34    TPro  7.9  /  Alb  3.8  /  TBili  0.3  /  DBili  x   /  AST  9<L>  /  ALT  8<L>  /  AlkPhos  72  02-08

## 2019-02-09 NOTE — PROGRESS NOTE ADULT - ASSESSMENT
Impression:  1)Ulcerative colitis flare s/p colonoscopy with biopsies showing severe colitis    Recommendations:  -f/u pathology from colonoscopy to r/o CMV  - continue IV Solumedrol 20IV q8 hours  -regular low fiber diet as tolerated, nutrition evaluation  -DVT ppx as patient is hypercoagulable with IBD  - trend CBC, CMP

## 2019-02-09 NOTE — PROVIDER CONTACT NOTE (MEDICATION) - ASSESSMENT
Pt is alert and oriented x4, no c /o pain. Patient reports multiple bloody stool. LR infusing at 125cc/hr. Vitals stable. Respirations are regular and unlabored.

## 2019-02-10 DIAGNOSIS — R21 RASH AND OTHER NONSPECIFIC SKIN ERUPTION: ICD-10-CM

## 2019-02-10 LAB
ANION GAP SERPL CALC-SCNC: 14 MMOL/L — SIGNIFICANT CHANGE UP (ref 5–17)
BUN SERPL-MCNC: 10 MG/DL — SIGNIFICANT CHANGE UP (ref 7–23)
CALCIUM SERPL-MCNC: 8.7 MG/DL — SIGNIFICANT CHANGE UP (ref 8.4–10.5)
CHLORIDE SERPL-SCNC: 106 MMOL/L — SIGNIFICANT CHANGE UP (ref 96–108)
CO2 SERPL-SCNC: 22 MMOL/L — SIGNIFICANT CHANGE UP (ref 22–31)
CREAT SERPL-MCNC: 1.16 MG/DL — SIGNIFICANT CHANGE UP (ref 0.5–1.3)
GLUCOSE SERPL-MCNC: 125 MG/DL — HIGH (ref 70–99)
HCT VFR BLD CALC: 28.4 % — LOW (ref 39–50)
HGB BLD-MCNC: 8.2 G/DL — LOW (ref 13–17)
MCHC RBC-ENTMCNC: 18.7 PG — LOW (ref 27–34)
MCHC RBC-ENTMCNC: 28.9 GM/DL — LOW (ref 32–36)
MCV RBC AUTO: 64.7 FL — LOW (ref 80–100)
PLATELET # BLD AUTO: 463 K/UL — HIGH (ref 150–400)
POTASSIUM SERPL-MCNC: 4.1 MMOL/L — SIGNIFICANT CHANGE UP (ref 3.5–5.3)
POTASSIUM SERPL-SCNC: 4.1 MMOL/L — SIGNIFICANT CHANGE UP (ref 3.5–5.3)
RBC # BLD: 4.39 M/UL — SIGNIFICANT CHANGE UP (ref 4.2–5.8)
RBC # FLD: 18.9 % — HIGH (ref 10.3–14.5)
SODIUM SERPL-SCNC: 142 MMOL/L — SIGNIFICANT CHANGE UP (ref 135–145)
WBC # BLD: 13.71 K/UL — HIGH (ref 3.8–10.5)
WBC # FLD AUTO: 13.71 K/UL — HIGH (ref 3.8–10.5)

## 2019-02-10 RX ADMIN — Medication 1 APPLICATION(S): at 17:45

## 2019-02-10 RX ADMIN — Medication 20 MILLIGRAM(S): at 04:54

## 2019-02-10 RX ADMIN — PANTOPRAZOLE SODIUM 40 MILLIGRAM(S): 20 TABLET, DELAYED RELEASE ORAL at 06:18

## 2019-02-10 RX ADMIN — Medication 20 MILLIGRAM(S): at 13:16

## 2019-02-10 RX ADMIN — Medication 1 APPLICATION(S): at 05:01

## 2019-02-10 RX ADMIN — Medication 20 MILLIGRAM(S): at 21:14

## 2019-02-10 NOTE — PROGRESS NOTE ADULT - SUBJECTIVE AND OBJECTIVE BOX
Subjective: Patient seen and examined. No new events except as noted.   decrease episodes of bowel movement , total of 3 today     REVIEW OF SYSTEMS:    CONSTITUTIONAL: No weakness, fevers or chills  EYES/ENT: No visual changes;  No vertigo or throat pain   NECK: No pain or stiffness  RESPIRATORY: No cough, wheezing, hemoptysis; No shortness of breath  CARDIOVASCULAR: No chest pain or palpitations  GASTROINTESTINAL: No abdominal or epigastric pain. No nausea, vomiting, or hematemesis; No diarrhea or constipation. No melena or hematochezia.  GENITOURINARY: No dysuria, frequency or hematuria  NEUROLOGICAL: No numbness or weakness  SKIN: No itching, burning, rashes, or lesions   All other review of systems is negative unless indicated above.    MEDICATIONS:  MEDICATIONS  (STANDING):  clotrimazole 1% Cream 1 Application(s) Topical two times a day  heparin  Injectable 5000 Unit(s) SubCutaneous every 8 hours  influenza   Vaccine 0.5 milliLiter(s) IntraMuscular once  lactated ringers. 1000 milliLiter(s) (125 mL/Hr) IV Continuous <Continuous>  methylPREDNISolone sodium succinate Injectable 20 milliGRAM(s) IV Push every 8 hours  pantoprazole    Tablet 40 milliGRAM(s) Oral before breakfast      PHYSICAL EXAM:  T(C): 36.4 (02-10-19 @ 11:12), Max: 36.8 (02-09-19 @ 20:37)  HR: 66 (02-10-19 @ 11:12) (66 - 94)  BP: 102/61 (02-10-19 @ 11:12) (102/61 - 115/80)  RR: 18 (02-10-19 @ 11:12) (18 - 18)  SpO2: 100% (02-10-19 @ 11:12) (100% - 100%)  Wt(kg): --  I&O's Summary    09 Feb 2019 07:01  -  10 Feb 2019 07:00  --------------------------------------------------------  IN: 600 mL / OUT: 300 mL / NET: 300 mL    10 Feb 2019 07:01  -  10 Feb 2019 17:25  --------------------------------------------------------  IN: 360 mL / OUT: 0 mL / NET: 360 mL          Appearance: Normal	  HEENT:   Normal oral mucosa, PERRL, EOMI	  Lymphatic: No lymphadenopathy , no edema  Cardiovascular: Normal S1 S2, No JVD, No murmurs , Peripheral pulses palpable 2+ bilaterally  Respiratory: Lungs clear to auscultation, normal effort 	  Gastrointestinal:  Soft, Non-tender, + BS	  Skin: No rashes, No ecchymoses, No cyanosis, warm to touch  Musculoskeletal: Normal range of motion, normal strength  Psychiatry:  Mood & affect appropriate  Ext: No edema      All labs, Imaging and EKGs personally reviewed                             8.2    13.71 )-----------( 463      ( 10 Feb 2019 08:26 )             28.4               02-10    142  |  106  |  10  ----------------------------<  125<H>  4.1   |  22  |  1.16    Ca    8.7      10 Feb 2019 07:30

## 2019-02-11 ENCOUNTER — TRANSCRIPTION ENCOUNTER (OUTPATIENT)
Age: 39
End: 2019-02-11

## 2019-02-11 DIAGNOSIS — B36.0 PITYRIASIS VERSICOLOR: ICD-10-CM

## 2019-02-11 DIAGNOSIS — D64.9 ANEMIA, UNSPECIFIED: ICD-10-CM

## 2019-02-11 DIAGNOSIS — K13.0 DISEASES OF LIPS: ICD-10-CM

## 2019-02-11 LAB
ANION GAP SERPL CALC-SCNC: 12 MMOL/L — SIGNIFICANT CHANGE UP (ref 5–17)
BUN SERPL-MCNC: 14 MG/DL — SIGNIFICANT CHANGE UP (ref 7–23)
CALCIUM SERPL-MCNC: 8.3 MG/DL — LOW (ref 8.4–10.5)
CHLORIDE SERPL-SCNC: 105 MMOL/L — SIGNIFICANT CHANGE UP (ref 96–108)
CO2 SERPL-SCNC: 24 MMOL/L — SIGNIFICANT CHANGE UP (ref 22–31)
CREAT SERPL-MCNC: 1.16 MG/DL — SIGNIFICANT CHANGE UP (ref 0.5–1.3)
GLUCOSE SERPL-MCNC: 164 MG/DL — HIGH (ref 70–99)
HCT VFR BLD CALC: 26.9 % — LOW (ref 39–50)
HGB BLD-MCNC: 8.4 G/DL — LOW (ref 13–17)
MCHC RBC-ENTMCNC: 20.1 PG — LOW (ref 27–34)
MCHC RBC-ENTMCNC: 31.1 GM/DL — LOW (ref 32–36)
MCV RBC AUTO: 64.8 FL — LOW (ref 80–100)
PLATELET # BLD AUTO: 428 K/UL — HIGH (ref 150–400)
POTASSIUM SERPL-MCNC: 3.9 MMOL/L — SIGNIFICANT CHANGE UP (ref 3.5–5.3)
POTASSIUM SERPL-SCNC: 3.9 MMOL/L — SIGNIFICANT CHANGE UP (ref 3.5–5.3)
RBC # BLD: 4.15 M/UL — LOW (ref 4.2–5.8)
RBC # FLD: 17.2 % — HIGH (ref 10.3–14.5)
SODIUM SERPL-SCNC: 141 MMOL/L — SIGNIFICANT CHANGE UP (ref 135–145)
WBC # BLD: 14.1 K/UL — HIGH (ref 3.8–10.5)
WBC # FLD AUTO: 14.1 K/UL — HIGH (ref 3.8–10.5)

## 2019-02-11 PROCEDURE — 99232 SBSQ HOSP IP/OBS MODERATE 35: CPT | Mod: GC

## 2019-02-11 RX ORDER — KETOCONAZOLE 20 MG/G
1 AEROSOL, FOAM TOPICAL
Refills: 0 | Status: DISCONTINUED | OUTPATIENT
Start: 2019-02-11 | End: 2019-02-17

## 2019-02-11 RX ORDER — HYDROCORTISONE 1 %
1 OINTMENT (GRAM) TOPICAL
Refills: 0 | Status: DISCONTINUED | OUTPATIENT
Start: 2019-02-11 | End: 2019-02-17

## 2019-02-11 RX ADMIN — Medication 1 APPLICATION(S): at 18:20

## 2019-02-11 RX ADMIN — Medication 20 MILLIGRAM(S): at 04:56

## 2019-02-11 RX ADMIN — Medication 20 MILLIGRAM(S): at 21:06

## 2019-02-11 RX ADMIN — SODIUM CHLORIDE 125 MILLILITER(S): 9 INJECTION, SOLUTION INTRAVENOUS at 21:07

## 2019-02-11 RX ADMIN — SODIUM CHLORIDE 125 MILLILITER(S): 9 INJECTION, SOLUTION INTRAVENOUS at 13:18

## 2019-02-11 RX ADMIN — PANTOPRAZOLE SODIUM 40 MILLIGRAM(S): 20 TABLET, DELAYED RELEASE ORAL at 06:11

## 2019-02-11 RX ADMIN — KETOCONAZOLE 1 APPLICATION(S): 20 AEROSOL, FOAM TOPICAL at 18:22

## 2019-02-11 RX ADMIN — Medication 1 APPLICATION(S): at 06:12

## 2019-02-11 RX ADMIN — Medication 100 MILLIGRAM(S): at 23:16

## 2019-02-11 RX ADMIN — Medication 100 MILLIGRAM(S): at 17:54

## 2019-02-11 RX ADMIN — Medication 20 MILLIGRAM(S): at 12:39

## 2019-02-11 NOTE — DISCHARGE NOTE ADULT - HOSPITAL COURSE
Ulcerative colitis flare s/p colonoscopy with biopsies showing severe colitis now with some symptomatic improvement on IV steroids.      - f/u pathology from colonoscopy to r/o CMV  - He is already getting Entyvio and is on course to receive 3rd loading dose later this month  - regular low fiber diet as tolerated, nutrition evaluation  - DVT ppx as patient is hypercoagulable with IBD  - switch to oral steroids prednisone 40mg daily to be tapered as outpatient Ulcerative colitis flare s/p colonoscopy with biopsies showing severe colitis now with some symptomatic improvement on IV steroids.      - f/u pathology from colonoscopy to r/o CMV  - He is already getting Entyvio and is on course to receive 3rd loading dose later this month  - regular low fiber diet as tolerated, nutrition evaluation  - DVT ppx as patient is hypercoagulable with IBD  - switch to oral steroids prednisone 40mg daily to be tapered as outpatient     -tinea versicolor on neck and back, ddx eczematous dermatitis vs tinea corporis  - seen by derm, outpt f/u

## 2019-02-11 NOTE — DISCHARGE NOTE ADULT - CARE PROVIDERS DIRECT ADDRESSES
,DirectAddress_Unknown,DirectAddress_Unknown ,DirectAddress_Unknown,DirectAddress_Unknown,[email or web address] ,DirectAddress_Unknown,DirectAddress_Unknown,[email or web address]

## 2019-02-11 NOTE — DISCHARGE NOTE ADULT - PATIENT PORTAL LINK FT
You can access the [facility] Patient Portal, offered by [facility], by registering with the following website: [email or web address]

## 2019-02-11 NOTE — PROGRESS NOTE ADULT - SUBJECTIVE AND OBJECTIVE BOX
Chief Complaint:  Patient is a 38y old  Male who presents with a chief complaint of UC (10 Feb 2019 17:24)      Interval Events:   Patient continues on IV solumedrol. Surgical pathology still pending.    Allergies:  No Known Allergies      Hospital Medications:  clotrimazole 1% Cream 1 Application(s) Topical two times a day  heparin  Injectable 5000 Unit(s) SubCutaneous every 8 hours  influenza   Vaccine 0.5 milliLiter(s) IntraMuscular once  lactated ringers. 1000 milliLiter(s) IV Continuous <Continuous>  methylPREDNISolone sodium succinate Injectable 20 milliGRAM(s) IV Push every 8 hours  pantoprazole    Tablet 40 milliGRAM(s) Oral before breakfast      PMHX/PSHX:  Ulcerative colitis without complications, unspecified location  No significant past surgical history      Family history:  No pertinent family history in first degree relatives          PHYSICAL EXAM:     GENERAL:  Appears stated age, well-groomed, well-nourished, no distress  HEENT:  NC/AT,  conjunctivae clear, sclera -anicteric  CHEST:  Full & symmetric excursion, no increased  HEART:  Regular rhythm  ABDOMEN:  Soft, non-tender, non-distended, normoactive bowel sounds,  no masses ,no hepato-splenomegaly,   EXTREMITIES:  no cyanosis,clubbing or edema  SKIN:  No rash/erythema/ecchymoses/petechiae/wounds/abscess/warm/dry  NEURO:  Alert, oriented    Vital Signs:  Vital Signs Last 24 Hrs  T(C): 36.4 (11 Feb 2019 05:18), Max: 36.7 (10 Feb 2019 20:22)  T(F): 97.5 (11 Feb 2019 05:18), Max: 98 (10 Feb 2019 20:22)  HR: 74 (11 Feb 2019 05:18) (66 - 83)  BP: 121/85 (11 Feb 2019 05:18) (102/61 - 121/85)  BP(mean): --  RR: 18 (11 Feb 2019 05:18) (18 - 18)  SpO2: 100% (11 Feb 2019 05:18) (100% - 100%)  Daily     Daily     LABS:                        8.4    14.1  )-----------( 428      ( 11 Feb 2019 06:25 )             26.9     02-11    141  |  105  |  14  ----------------------------<  164<H>  3.9   |  24  |  1.16    Ca    8.3<L>      11 Feb 2019 06:25                Imaging: Chief Complaint:  Patient is a 38y old  Male who presents with a chief complaint of UC (10 Feb 2019 17:24)      Interval Events:   Patient continues on IV solumedrol. Surgical pathology still pending.  Patients notes he has had 2 BM's since midnight, and about 8 in the past 24 with a small amount of blood.  He notes this is significantly  improved from when he came in when it was more that 20 BM's a day with more blood.      Allergies:  No Known Allergies      Hospital Medications:  clotrimazole 1% Cream 1 Application(s) Topical two times a day  heparin  Injectable 5000 Unit(s) SubCutaneous every 8 hours  influenza   Vaccine 0.5 milliLiter(s) IntraMuscular once  lactated ringers. 1000 milliLiter(s) IV Continuous <Continuous>  methylPREDNISolone sodium succinate Injectable 20 milliGRAM(s) IV Push every 8 hours  pantoprazole    Tablet 40 milliGRAM(s) Oral before breakfast      PMHX/PSHX:  Ulcerative colitis without complications, unspecified location  No significant past surgical history      Family history:  No pertinent family history in first degree relatives          PHYSICAL EXAM:     GENERAL:  Appears stated age, well-groomed, well-nourished, no distress  HEENT:  NC/AT,  conjunctivae clear, sclera -anicteric  CHEST:  Full & symmetric excursion, no increased  HEART:  Regular rhythm  ABDOMEN:  Soft, non-tender, non-distended, normoactive bowel sounds,  no masses ,no hepato-splenomegaly,   EXTREMITIES:  no cyanosis,clubbing or edema  SKIN:  No rash/erythema/ecchymoses/petechiae/wounds/abscess/warm/dry  NEURO:  Alert, oriented    Vital Signs:  Vital Signs Last 24 Hrs  T(C): 36.4 (11 Feb 2019 05:18), Max: 36.7 (10 Feb 2019 20:22)  T(F): 97.5 (11 Feb 2019 05:18), Max: 98 (10 Feb 2019 20:22)  HR: 74 (11 Feb 2019 05:18) (66 - 83)  BP: 121/85 (11 Feb 2019 05:18) (102/61 - 121/85)  BP(mean): --  RR: 18 (11 Feb 2019 05:18) (18 - 18)  SpO2: 100% (11 Feb 2019 05:18) (100% - 100%)  Daily     Daily     LABS:                        8.4    14.1  )-----------( 428      ( 11 Feb 2019 06:25 )             26.9     02-11    141  |  105  |  14  ----------------------------<  164<H>  3.9   |  24  |  1.16    Ca    8.3<L>      11 Feb 2019 06:25                Imaging:

## 2019-02-11 NOTE — DISCHARGE NOTE ADULT - CARE PLAN
Principal Discharge DX:	Ulcerative colitis without complications, unspecified location  Goal:	resolution of symptoms  Assessment and plan of treatment:	HOME CARE INSTRUCTIONS  Get plenty of rest.  Drink enough water and fluids to keep your urine clear or pale yellow.  Eat a well-balanced diet.  Call your caregiver for follow-up as recommended.  SEEK IMMEDIATE MEDICAL CARE IF:  You develop chills.  You have an oral temperature above _____________________, not controlled by medicine.  You have extreme weakness, fainting, or dehydration.  You have repeated vomiting.  You develop severe belly (abdominal) pain or are passing bloody or tarry stools  Secondary Diagnosis:	Gastrointestinal hemorrhage, unspecified gastrointestinal hemorrhage type  Assessment and plan of treatment:	There are 2 common types of GI Bleed, Upper GI Bleed and Lower GI Bleed.  Upper GI Bleed affects the esophagus, stomach, and first part of the small intestine. Lower GI Bleed affects the colon and rectum.  Upper GI Bleed signs and symptoms to notify your Health Care Provider are vomiting blood, or coffee ground vomitus, and bowel movements that look like black tar.  Lower GI Bleed signs and symptoms to notify your health care provider are bright red bloody bowel movements.   Take your medications as prescribed by your Gastroenterologist.  If you have had an Endoscopy or Colonoscopy, follow up with your Gastroenterologist for Pathology results.  Avoid NSAIDs unless your Health Care Provider tells you that it is ok (Aspirin, Ibuprofen, Advil, Motrin, Aleve).  Follow up with your Gastroenterologist within 1-2 weeks of discharge.  Secondary Diagnosis:	CORNELL (acute kidney injury)  Assessment and plan of treatment:	Resolved Principal Discharge DX:	Ulcerative colitis without complications, unspecified location  Goal:	resolution of symptoms  Assessment and plan of treatment:	HOME CARE INSTRUCTIONS  Get plenty of rest.  Drink enough water and fluids to keep your urine clear or pale yellow.  Eat a well-balanced diet.  Call your caregiver for follow-up as recommended.  SEEK IMMEDIATE MEDICAL CARE IF:  You develop chills.  You have an oral temperature above _____________________, not controlled by medicine.  You have extreme weakness, fainting, or dehydration.  You have repeated vomiting.  You develop severe belly (abdominal) pain or are passing bloody or tarry stools  Secondary Diagnosis:	Gastrointestinal hemorrhage, unspecified gastrointestinal hemorrhage type  Assessment and plan of treatment:	There are 2 common types of GI Bleed, Upper GI Bleed and Lower GI Bleed.  Upper GI Bleed affects the esophagus, stomach, and first part of the small intestine. Lower GI Bleed affects the colon and rectum.  Upper GI Bleed signs and symptoms to notify your Health Care Provider are vomiting blood, or coffee ground vomitus, and bowel movements that look like black tar.  Lower GI Bleed signs and symptoms to notify your health care provider are bright red bloody bowel movements.   Take your medications as prescribed by your Gastroenterologist.  If you have had an Endoscopy or Colonoscopy, follow up with your Gastroenterologist for Pathology results.  Avoid NSAIDs unless your Health Care Provider tells you that it is ok (Aspirin, Ibuprofen, Advil, Motrin, Aleve).  Follow up with your Gastroenterologist within 1-2 weeks of discharge.  Secondary Diagnosis:	CORNELL (acute kidney injury)  Assessment and plan of treatment:	Resolved  Secondary Diagnosis:	Tinea versicolor  Assessment and plan of treatment:	tinea versicolor on neck and back-  -apply ketoconazole shampoo every other day, apply for 5-7 minutes and then rinse off, can decrease frequency to once weekly to prevent recurrence  -apply ketoconazole cream twice daily to AA on neck and back x 4-6 weeks  angular cheilitis -   -apply ketoconazole cream twice daily to AA around lips, mix with hydrocortisone 2.5% ointment x 1-2 weeks, then continue with ketoconazole cream  -use Vaseline or Aquaphor multiple times daily to create barrier and moisturize lips Principal Discharge DX:	Ulcerative colitis without complications, unspecified location  Goal:	resolution of symptoms  Assessment and plan of treatment:	HOME CARE INSTRUCTIONS  Get plenty of rest.  Drink enough water and fluids to keep your urine clear or pale yellow.  Eat a well-balanced diet.  Call your caregiver for follow-up as recommended.  SEEK IMMEDIATE MEDICAL CARE IF:  You develop chills.  You have an oral temperature above ________102_____________, not controlled by medicine.  You have extreme weakness, fainting, or dehydration.  You have repeated vomiting.  You develop severe belly (abdominal) pain or are passing bloody or tarry stools  Secondary Diagnosis:	Gastrointestinal hemorrhage, unspecified gastrointestinal hemorrhage type  Assessment and plan of treatment:	There are 2 common types of GI Bleed, Upper GI Bleed and Lower GI Bleed.  Upper GI Bleed affects the esophagus, stomach, and first part of the small intestine. Lower GI Bleed affects the colon and rectum.  Upper GI Bleed signs and symptoms to notify your Health Care Provider are vomiting blood, or coffee ground vomitus, and bowel movements that look like black tar.  Lower GI Bleed signs and symptoms to notify your health care provider are bright red bloody bowel movements.   Take your medications as prescribed by your Gastroenterologist.  If you have had an Endoscopy or Colonoscopy, follow up with your Gastroenterologist for Pathology results.  Avoid NSAIDs unless your Health Care Provider tells you that it is ok (Aspirin, Ibuprofen, Advil, Motrin, Aleve).  Follow up with your Gastroenterologist within 1-2 weeks of discharge.  Secondary Diagnosis:	CORNELL (acute kidney injury)  Assessment and plan of treatment:	Resolved  Secondary Diagnosis:	Tinea versicolor  Assessment and plan of treatment:	tinea versicolor on neck and back-  -apply ketoconazole shampoo every other day, apply for 5-7 minutes and then rinse off, can decrease frequency to once weekly to prevent recurrence  -apply ketoconazole cream twice daily to AA on neck and back x 4-6 weeks  angular cheilitis -   -apply ketoconazole cream twice daily to AA around lips, mix with hydrocortisone 2.5% ointment x 1-2 weeks, then continue with ketoconazole cream  -use Vaseline or Aquaphor multiple times daily to create barrier and moisturize lips

## 2019-02-11 NOTE — DISCHARGE NOTE ADULT - ADDITIONAL INSTRUCTIONS
Follow up with PMD in 1 week  Follow up with GI in 2 weeks Follow up with PMD in 1 week  Follow up with GI in 2 weeks  Follow up with dermatology at [address] [place] [address], [facility]. call [phone] to schedule appointment with Dr. Raza in 1-2 months after discharge f/u with Dr. Franz outpatient in 1-2 weeks.  Follow up with PMD in 1 week  Follow up with GI in 2 weeks  Follow up with dermatology at [address] [place] [address], [facility]. call [phone] to schedule appointment with Dr. Raza in 1-2 months after discharge

## 2019-02-11 NOTE — CONSULT NOTE ADULT - ASSESSMENT
39 y/o M carlos  here for UC flare management. Patient on Humira and solumedrol putting him at an increased risk for skin infections.      tinea versicolor on neck, ddx eczematous dermatitis vs tinea corporis  -apply ketoconazole cream twice daily to AA on neck x 4-6 weeks    angular cheilitis -   -apply metroniazole gel twice daily to AA around lips  -use Vaseline or Aquaphor multiple times daily to create barrier and moisturize lips  -if no improvement in 1 week, recommend hydrocortisone 2.5% ointment twice daily to lips x 5-7 days and then stop 37 y/o M carlos  here for UC flare management. Patient on Humira and solumedrol putting him at an increased risk for fungal skin infections.      tinea versicolor on neck and back, ddx eczematous dermatitis vs tinea corporis  -apply ketoconazole shampoo every other day, apply for 5-7 minutes and then rinse off, can decrease frequency to once weekly to prevent recurrence  -apply ketoconazole cream twice daily to AA on neck and back x 4-6 weeks    angular cheilitis -   -apply ketoconazole cream twice daily to AA around lips, mix with hydrocortisone 2.5% ointment x 1-2 weeks, then continue with ketoconazole cream  -use Vaseline or Aquaphor multiple times daily to create barrier and moisturize lips 37 y/o M carlos  here for UC flare management. Patient on Humira and solumedrol putting him at an increased risk for fungal skin infections.      tinea versicolor on neck and back, ddx eczematous dermatitis vs tinea corporis  -apply ketoconazole shampoo every other day, apply for 5-7 minutes and then rinse off, can decrease frequency to once weekly to prevent recurrence  -apply ketoconazole cream twice daily to AA on neck and back x 4-6 weeks    angular cheilitis -   -apply ketoconazole cream twice daily to AA around lips, mix with hydrocortisone 2.5% ointment x 1-2 weeks, then continue with ketoconazole cream  -use Vaseline or Aquaphor multiple times daily to create barrier and moisturize lips      Patient staffed with Dr. Raza    Derm will sign off. Please page [phone] with questions or if patient's skin concern needs to be re-evaluated.    Patient can follow up with dermatology at [address] [place] [address], [facility]. Patient should call [phone] to schedule appointment with Dr. Raza in 1-2 months after discharge .    Amina Donald MD PGY-2  [facility] Dermatology  Pager: [phone]  Office: [phone]

## 2019-02-11 NOTE — CONSULT NOTE ADULT - SUBJECTIVE AND OBJECTIVE BOX
HPI:  Patient is a 38 M PMH UC x 5 years. Patient of Dr Franz referred to ED for admission for stabilization by GI on UC regimen and for flex sig in  the morning .Patient has been having multiple bloody bowel movement over the past few weeks (18 in the past three days) and has been resistant to multiple therapies for UC. (07 Feb 2019 18:32) Dermatology consulted for rash on neck and dry lips. Patient notes nonpruritic rash on neck for 3-4 months since patient started Humira. Patient has not tried any treatment and denies hx of similar rash. Patient also notes right oral commissure is often dry, cracked and painful. Patient has uses Carmex without improvement. Patient denies blisters or tingling.       PAST MEDICAL & SURGICAL HISTORY:  Ulcerative colitis without complications, unspecified location  No significant past surgical history      REVIEW OF SYSTEMS      General: no fevers/chills, no lethargy	    Skin/Breast: see HPI  	  Ophthalmologic: no eye pain or change in vision  	  ENMT: no dysphagia or change in hearing    Respiratory and Thorax: no SOB or cough  	  Cardiovascular: no palpitations or chest pain    Gastrointestinal: +UC flare    Genitourinary: no dysuria or frequency    Musculoskeletal: no joint pains or weakness	    Neurological:no weakness, numbness , or tingling    MEDICATIONS  (STANDING):  clotrimazole 1% Cream 1 Application(s) Topical two times a day  heparin  Injectable 5000 Unit(s) SubCutaneous every 8 hours  influenza   Vaccine 0.5 milliLiter(s) IntraMuscular once  lactated ringers. 1000 milliLiter(s) (125 mL/Hr) IV Continuous <Continuous>  methylPREDNISolone sodium succinate Injectable 20 milliGRAM(s) IV Push every 8 hours  pantoprazole    Tablet 40 milliGRAM(s) Oral before breakfast    MEDICATIONS  (PRN):      Allergies    No Known Allergies    Intolerances        SOCIAL HISTORY:    FAMILY HISTORY:  No pertinent family history in first degree relatives      Vital Signs Last 24 Hrs  T(C): 36.6 (11 Feb 2019 11:59), Max: 36.7 (10 Feb 2019 20:22)  T(F): 97.8 (11 Feb 2019 11:59), Max: 98 (10 Feb 2019 20:22)  HR: 75 (11 Feb 2019 11:59) (74 - 83)  BP: 116/78 (11 Feb 2019 11:59) (109/73 - 121/85)  BP(mean): --  RR: 18 (11 Feb 2019 11:59) (18 - 18)  SpO2: 99% (11 Feb 2019 11:59) (99% - 100%)    PHYSICAL EXAM:     The patient was alert and oriented X 3, well nourished, and in no  apparent distress.  OP showed no ulcerations  There was no visible lymphadenopathy.  Conjunctiva were non injected  There was no clubbing or edema of extremities.  The scalp, hair, face, eyebrows, lips, OP, neck, chest, back,   extremities X 4, nails were examined.  There was no hyperhidrosis or bromhidrosis.    Of note on skin exam:     hyperpigmented patches on anterior neck, fine scale, back clear  right oral commissure with mildly lichenified pink eczematous plaque and fissure    LABS:                        8.4    14.1  )-----------( 428      ( 11 Feb 2019 06:25 )             26.9     02-11    141  |  105  |  14  ----------------------------<  164<H>  3.9   |  24  |  1.16    Ca    8.3<L>      11 Feb 2019 06:25            RADIOLOGY & ADDITIONAL STUDIES: HPI:  Patient is a 38 M PMH UC x 5 years. Patient of Dr Franz referred to ED for admission for stabilization by GI on UC regimen and for flex sig in  the morning .Patient has been having multiple bloody bowel movement over the past few weeks (18 in the past three days) and has been resistant to multiple therapies for UC. (07 Feb 2019 18:32) Dermatology consulted for rash on neck and dry lips. Patient notes nonpruritic rash on neck for 3-4 months since patient started Humira. Patient has not tried any treatment and denies hx of similar rash. Patient also notes right oral commissure is often dry, cracked and painful. Patient has uses Carmex without improvement. Patient denies blisters or tingling.       PAST MEDICAL & SURGICAL HISTORY:  Ulcerative colitis without complications, unspecified location  No significant past surgical history      REVIEW OF SYSTEMS      General: no fevers/chills, no lethargy	    Skin/Breast: see HPI  	  Ophthalmologic: no eye pain or change in vision  	  ENMT: no dysphagia or change in hearing    Respiratory and Thorax: no SOB or cough  	  Cardiovascular: no palpitations or chest pain    Gastrointestinal: +UC flare    Genitourinary: no dysuria or frequency    Musculoskeletal: no joint pains or weakness	    Neurological:no weakness, numbness , or tingling    MEDICATIONS  (STANDING):  clotrimazole 1% Cream 1 Application(s) Topical two times a day  heparin  Injectable 5000 Unit(s) SubCutaneous every 8 hours  influenza   Vaccine 0.5 milliLiter(s) IntraMuscular once  lactated ringers. 1000 milliLiter(s) (125 mL/Hr) IV Continuous <Continuous>  methylPREDNISolone sodium succinate Injectable 20 milliGRAM(s) IV Push every 8 hours  pantoprazole    Tablet 40 milliGRAM(s) Oral before breakfast    MEDICATIONS  (PRN):      Allergies    No Known Allergies    Intolerances        SOCIAL HISTORY:    FAMILY HISTORY:  No pertinent family history in first degree relatives      Vital Signs Last 24 Hrs  T(C): 36.6 (11 Feb 2019 11:59), Max: 36.7 (10 Feb 2019 20:22)  T(F): 97.8 (11 Feb 2019 11:59), Max: 98 (10 Feb 2019 20:22)  HR: 75 (11 Feb 2019 11:59) (74 - 83)  BP: 116/78 (11 Feb 2019 11:59) (109/73 - 121/85)  BP(mean): --  RR: 18 (11 Feb 2019 11:59) (18 - 18)  SpO2: 99% (11 Feb 2019 11:59) (99% - 100%)    PHYSICAL EXAM:     The patient was alert and oriented X 3, well nourished, and in no  apparent distress.  OP showed no ulcerations  There was no visible lymphadenopathy.  Conjunctiva were non injected  There was no clubbing or edema of extremities.  The scalp, hair, face, eyebrows, lips, OP, neck, chest, back,   extremities X 4, nails were examined.  There was no hyperhidrosis or bromhidrosis.    Of note on skin exam:     hyperpigmented oval macules and patches on anterior neck and back, fine scale,  right oral commissure with mildly lichenified pink eczematous plaque and fissure    LABS:                        8.4    14.1  )-----------( 428      ( 11 Feb 2019 06:25 )             26.9     02-11    141  |  105  |  14  ----------------------------<  164<H>  3.9   |  24  |  1.16    Ca    8.3<L>      11 Feb 2019 06:25            RADIOLOGY & ADDITIONAL STUDIES:

## 2019-02-11 NOTE — DISCHARGE NOTE ADULT - CARE PROVIDER_API CALL
Kb Altman ()  Medicine  [address] [facility] [address]  [place] [address]  Phone: [phone]  Fax: [phone]  Follow Up Time:     Fifi Robertson)  Internal Medicine  [address] [place] [address]  Phone: [phone]  Fax: [phone]  Follow Up Time: Kb Altman ()  Medicine  [address] [facility] [address]  [place] [address]  Phone: [phone]  Fax: [phone]  Follow Up Time:     Fifi Robertson)  Internal Medicine  [address] [place] [address]  Phone: [phone]  Fax: [phone]  Follow Up Time:     Grace Raza)  Dermatology  [address] [place] [address], [address]  [place] [address]  Phone: [phone]  Fax: [phone]  Follow Up Time: Kb Altman (DO)  Medicine  [address] [facility], [address]  [place] [address]  Phone: [phone]  Fax: [phone]  Follow Up Time:     Fifi Robertson)  Internal Medicine  [address] [place] [address]  Phone: [phone]  Fax: [phone]  Follow Up Time:     Grace Raza)  Dermatology  [address] [place] [address], [address]  [place] [address]  Phone: [phone]  Fax: [phone]  Follow Up Time:     Rohith Franz (MD)  Gastroenterology; Internal Medicine  [address] [facility], [address]  [place] [address]  Phone: [phone]  Fax: [phone]  Follow Up Time:

## 2019-02-11 NOTE — PROGRESS NOTE ADULT - SUBJECTIVE AND OBJECTIVE BOX
Subjective: Patient seen and examined. No new events except as noted.   worsened diarrhea last night     REVIEW OF SYSTEMS:    CONSTITUTIONAL: No weakness, fevers or chills  EYES/ENT: No visual changes;  No vertigo or throat pain   NECK: No pain or stiffness  RESPIRATORY: No cough, wheezing, hemoptysis; No shortness of breath  CARDIOVASCULAR: No chest pain or palpitations  GASTROINTESTINAL: + diarrhea   GENITOURINARY: No dysuria, frequency or hematuria  NEUROLOGICAL: No numbness or weakness  SKIN: No itching, burning, rashes, or lesions   All other review of systems is negative unless indicated above.    MEDICATIONS:  MEDICATIONS  (STANDING):  clotrimazole 1% Cream 1 Application(s) Topical two times a day  heparin  Injectable 5000 Unit(s) SubCutaneous every 8 hours  influenza   Vaccine 0.5 milliLiter(s) IntraMuscular once  lactated ringers. 1000 milliLiter(s) (125 mL/Hr) IV Continuous <Continuous>  methylPREDNISolone sodium succinate Injectable 20 milliGRAM(s) IV Push every 8 hours  pantoprazole    Tablet 40 milliGRAM(s) Oral before breakfast      PHYSICAL EXAM:  T(C): 36.4 (02-11-19 @ 05:18), Max: 36.7 (02-10-19 @ 20:22)  HR: 74 (02-11-19 @ 05:18) (66 - 83)  BP: 121/85 (02-11-19 @ 05:18) (102/61 - 121/85)  RR: 18 (02-11-19 @ 05:18) (18 - 18)  SpO2: 100% (02-11-19 @ 05:18) (100% - 100%)  Wt(kg): --  I&O's Summary    10 Feb 2019 07:01  -  11 Feb 2019 07:00  --------------------------------------------------------  IN: 1850 mL / OUT: 0 mL / NET: 1850 mL          Appearance: Normal	  HEENT:   Normal oral mucosa, PERRL, EOMI	  Lymphatic: No lymphadenopathy , no edema  Cardiovascular: Normal S1 S2, No JVD, No murmurs , Peripheral pulses palpable 2+ bilaterally  Respiratory: Lungs clear to auscultation, normal effort 	  Gastrointestinal:  Soft, Non-tender, + BS	  Skin: B/L neck skin discoloration and rash   Musculoskeletal: Normal range of motion, normal strength  Psychiatry:  Mood & affect appropriate  Ext: No edema      All labs, Imaging and EKGs personally reviewed                             8.4    14.1  )-----------( 428      ( 11 Feb 2019 06:25 )             26.9               02-11    141  |  105  |  14  ----------------------------<  164<H>  3.9   |  24  |  1.16    Ca    8.3<L>      11 Feb 2019 06:25

## 2019-02-11 NOTE — DISCHARGE NOTE ADULT - MEDICATION SUMMARY - MEDICATIONS TO TAKE
I will START or STAY ON the medications listed below when I get home from the hospital:    predniSONE 20 mg oral tablet  -- 2 tab(s) by mouth once a day  -- Indication: For Ulcerative colitis without complications    Zofran ODT 4 mg oral tablet, disintegrating  -- 1 tab(s) by mouth every 8 hours, As Needed   -- Indication: For Antiemetic    benzonatate 100 mg oral capsule  -- 1 cap(s) by mouth every 8 hours, As needed, Cough  -- Indication: For cough    ketoconazole 2% topical cream  -- 1 application on skin 2 times a day  -- Indication: For Tinea versicolor    ketoconazole 2% topical shampoo  -- Apply on skin to affected area once a week  -- Indication: For Tinea versicolor    hydrocortisone 2.5% topical ointment  -- 1 application on skin 2 times a day  -- Indication: For Tinea versicolor    clotrimazole 1% topical cream  -- 1 application on skin 2 times a day  -- Indication: For Tinea versicolor    ferrous sulfate 325 mg (65 mg elemental iron) oral tablet  -- 1 tab(s) by mouth once a day  -- Indication: For iron supplement    fluticasone 50 mcg/inh nasal spray  -- 1 spray(s) into nose 2 times a day  -- Indication: For Allergies    pantoprazole 40 mg oral delayed release tablet  -- 1 tab(s) by mouth once a day (before a meal)  -- Indication: For Gerd    calcium-vitamin D 500 mg-200 intl units oral tablet  -- 1 tab(s) by mouth once a day  -- Indication: For Supplement I will START or STAY ON the medications listed below when I get home from the hospital:    predniSONE 20 mg oral tablet  -- 2 tab(s) by mouth once a day  -- Indication: For Ulcerative colitis without complications    Zofran ODT 4 mg oral tablet, disintegrating  -- 1 tab(s) by mouth every 8 hours, As Needed   -- Indication: For Antiemetic    benzonatate 100 mg oral capsule  -- 1 cap(s) by mouth every 8 hours, As needed, Cough  -- Indication: For cough    ketoconazole 2% topical cream  -- 1 application on skin 2 times a day  -- Indication: For Tinea versicolor    ketoconazole 2% topical shampoo  -- Apply on skin to affected area once a week  -- Indication: For Tinea versicolor    hydrocortisone 2.5% topical ointment  -- 1 application on skin 2 times a day  -- Indication: For Tinea versicolor    clotrimazole 1% topical cream  -- 1 application on skin 2 times a day  -- Indication: For Tinea versicolor    ferrous sulfate 325 mg (65 mg elemental iron) oral tablet  -- 1 tab(s) by mouth 3 times a day  -- Indication: For iron supplement    fluticasone 50 mcg/inh nasal spray  -- 1 spray(s) into nose 2 times a day  -- Indication: For Allergies     pantoprazole 40 mg oral delayed release tablet  -- 1 tab(s) by mouth once a day (before a meal)  -- Indication: For GERD    calcium-vitamin D 500 mg-200 intl units oral tablet  -- 1 tab(s) by mouth once a day  -- Indication: For Supplement

## 2019-02-11 NOTE — PROGRESS NOTE ADULT - ASSESSMENT
Impression:  1)Ulcerative colitis flare s/p colonoscopy with biopsies showing severe colitis    Recommendations:  - f/u pathology from colonoscopy to r/o CMV  - continue IV Solumedrol 20IV q8 hours  - regular low fiber diet as tolerated, nutrition evaluation  - DVT ppx as patient is hypercoagulable with IBD  - trend CBC, CMP    Lis Rabago, PGY-4  Gastroenterology Fellow  Pager x [phone] or [phone]  (After 5 pm or on weekends please page GI on call)

## 2019-02-11 NOTE — DISCHARGE NOTE ADULT - SECONDARY DIAGNOSIS.
Gastrointestinal hemorrhage, unspecified gastrointestinal hemorrhage type CORNELL (acute kidney injury) Tinea versicolor

## 2019-02-11 NOTE — DISCHARGE NOTE ADULT - PLAN OF CARE
resolution of symptoms HOME CARE INSTRUCTIONS  Get plenty of rest.  Drink enough water and fluids to keep your urine clear or pale yellow.  Eat a well-balanced diet.  Call your caregiver for follow-up as recommended.  SEEK IMMEDIATE MEDICAL CARE IF:  You develop chills.  You have an oral temperature above _____________________, not controlled by medicine.  You have extreme weakness, fainting, or dehydration.  You have repeated vomiting.  You develop severe belly (abdominal) pain or are passing bloody or tarry stools There are 2 common types of GI Bleed, Upper GI Bleed and Lower GI Bleed.  Upper GI Bleed affects the esophagus, stomach, and first part of the small intestine. Lower GI Bleed affects the colon and rectum.  Upper GI Bleed signs and symptoms to notify your Health Care Provider are vomiting blood, or coffee ground vomitus, and bowel movements that look like black tar.  Lower GI Bleed signs and symptoms to notify your health care provider are bright red bloody bowel movements.   Take your medications as prescribed by your Gastroenterologist.  If you have had an Endoscopy or Colonoscopy, follow up with your Gastroenterologist for Pathology results.  Avoid NSAIDs unless your Health Care Provider tells you that it is ok (Aspirin, Ibuprofen, Advil, Motrin, Aleve).  Follow up with your Gastroenterologist within 1-2 weeks of discharge. Resolved tinea versicolor on neck and back-  -apply ketoconazole shampoo every other day, apply for 5-7 minutes and then rinse off, can decrease frequency to once weekly to prevent recurrence  -apply ketoconazole cream twice daily to AA on neck and back x 4-6 weeks  angular cheilitis -   -apply ketoconazole cream twice daily to AA around lips, mix with hydrocortisone 2.5% ointment x 1-2 weeks, then continue with ketoconazole cream  -use Vaseline or Aquaphor multiple times daily to create barrier and moisturize lips HOME CARE INSTRUCTIONS  Get plenty of rest.  Drink enough water and fluids to keep your urine clear or pale yellow.  Eat a well-balanced diet.  Call your caregiver for follow-up as recommended.  SEEK IMMEDIATE MEDICAL CARE IF:  You develop chills.  You have an oral temperature above ________102_____________, not controlled by medicine.  You have extreme weakness, fainting, or dehydration.  You have repeated vomiting.  You develop severe belly (abdominal) pain or are passing bloody or tarry stools

## 2019-02-12 LAB
ANION GAP SERPL CALC-SCNC: 13 MMOL/L — SIGNIFICANT CHANGE UP (ref 5–17)
BUN SERPL-MCNC: 15 MG/DL — SIGNIFICANT CHANGE UP (ref 7–23)
CALCIUM SERPL-MCNC: 8.7 MG/DL — SIGNIFICANT CHANGE UP (ref 8.4–10.5)
CHLORIDE SERPL-SCNC: 103 MMOL/L — SIGNIFICANT CHANGE UP (ref 96–108)
CO2 SERPL-SCNC: 24 MMOL/L — SIGNIFICANT CHANGE UP (ref 22–31)
CREAT SERPL-MCNC: 0.97 MG/DL — SIGNIFICANT CHANGE UP (ref 0.5–1.3)
GLUCOSE SERPL-MCNC: 113 MG/DL — HIGH (ref 70–99)
HCT VFR BLD CALC: 27 % — LOW (ref 39–50)
HGB BLD-MCNC: 8.2 G/DL — LOW (ref 13–17)
MCHC RBC-ENTMCNC: 19.6 PG — LOW (ref 27–34)
MCHC RBC-ENTMCNC: 30.4 GM/DL — LOW (ref 32–36)
MCV RBC AUTO: 64.4 FL — LOW (ref 80–100)
PLATELET # BLD AUTO: 509 K/UL — HIGH (ref 150–400)
POTASSIUM SERPL-MCNC: 3.8 MMOL/L — SIGNIFICANT CHANGE UP (ref 3.5–5.3)
POTASSIUM SERPL-SCNC: 3.8 MMOL/L — SIGNIFICANT CHANGE UP (ref 3.5–5.3)
RBC # BLD: 4.19 M/UL — LOW (ref 4.2–5.8)
RBC # FLD: 18.6 % — HIGH (ref 10.3–14.5)
SODIUM SERPL-SCNC: 140 MMOL/L — SIGNIFICANT CHANGE UP (ref 135–145)
WBC # BLD: 13.7 K/UL — HIGH (ref 3.8–10.5)
WBC # FLD AUTO: 13.7 K/UL — HIGH (ref 3.8–10.5)

## 2019-02-12 PROCEDURE — 99232 SBSQ HOSP IP/OBS MODERATE 35: CPT | Mod: GC

## 2019-02-12 RX ADMIN — Medication 1 APPLICATION(S): at 05:01

## 2019-02-12 RX ADMIN — Medication 100 MILLIGRAM(S): at 17:55

## 2019-02-12 RX ADMIN — KETOCONAZOLE 1 APPLICATION(S): 20 AEROSOL, FOAM TOPICAL at 09:28

## 2019-02-12 RX ADMIN — PANTOPRAZOLE SODIUM 40 MILLIGRAM(S): 20 TABLET, DELAYED RELEASE ORAL at 06:06

## 2019-02-12 RX ADMIN — Medication 1 APPLICATION(S): at 17:55

## 2019-02-12 RX ADMIN — SODIUM CHLORIDE 125 MILLILITER(S): 9 INJECTION, SOLUTION INTRAVENOUS at 06:21

## 2019-02-12 RX ADMIN — KETOCONAZOLE 1 APPLICATION(S): 20 AEROSOL, FOAM TOPICAL at 05:00

## 2019-02-12 RX ADMIN — SODIUM CHLORIDE 125 MILLILITER(S): 9 INJECTION, SOLUTION INTRAVENOUS at 14:25

## 2019-02-12 RX ADMIN — Medication 100 MILLIGRAM(S): at 21:09

## 2019-02-12 RX ADMIN — SODIUM CHLORIDE 125 MILLILITER(S): 9 INJECTION, SOLUTION INTRAVENOUS at 09:29

## 2019-02-12 RX ADMIN — Medication 20 MILLIGRAM(S): at 14:25

## 2019-02-12 RX ADMIN — SODIUM CHLORIDE 125 MILLILITER(S): 9 INJECTION, SOLUTION INTRAVENOUS at 21:06

## 2019-02-12 RX ADMIN — Medication 20 MILLIGRAM(S): at 21:07

## 2019-02-12 RX ADMIN — KETOCONAZOLE 1 APPLICATION(S): 20 AEROSOL, FOAM TOPICAL at 17:56

## 2019-02-12 RX ADMIN — Medication 1 APPLICATION(S): at 05:00

## 2019-02-12 RX ADMIN — Medication 100 MILLIGRAM(S): at 06:59

## 2019-02-12 RX ADMIN — Medication 20 MILLIGRAM(S): at 04:49

## 2019-02-12 RX ADMIN — Medication 1 APPLICATION(S): at 17:57

## 2019-02-12 NOTE — PROGRESS NOTE ADULT - ASSESSMENT
Impression:  1)Ulcerative colitis flare s/p colonoscopy with biopsies showing severe colitis now with some symptomatic improvement on IV steroids.      Recommendations:  - f/u pathology from colonoscopy to r/o CMV  - continue IV Solumedrol 20IV q8 hours  - regular low fiber diet as tolerated, nutrition evaluation  - DVT ppx as patient is hypercoagulable with IBD    Lis Rabago, PGY-4  Gastroenterology Fellow  Pager x [phone] or [phone]  (After 5 pm or on weekends please page GI on call) Impression:  1)Ulcerative colitis flare s/p colonoscopy with biopsies showing severe colitis now with some symptomatic improvement on IV steroids.      Recommendations:  - f/u pathology from colonoscopy to r/o CMV  - continue IV Solumedrol 20IV q8 hours, will likely switch to PO tomorrow  - regular low fiber diet as tolerated, nutrition evaluation  - DVT ppx as patient is hypercoagulable with IBD    Lis Rabago, PGY-4  Gastroenterology Fellow  Pager x [phone] or [phone]  (After 5 pm or on weekends please page GI on call)

## 2019-02-12 NOTE — PROGRESS NOTE ADULT - SUBJECTIVE AND OBJECTIVE BOX
Chief Complaint:  Patient is a 38y old  Male who presents with a chief complaint of UC (11 Feb 2019 15:42)      Interval Events:   Yesterday patient stated that his diarrhea was improving.      Allergies:  No Known Allergies      Hospital Medications:  clotrimazole 1% Cream 1 Application(s) Topical two times a day  heparin  Injectable 5000 Unit(s) SubCutaneous every 8 hours  hydrocortisone 2.5% Ointment 1 Application(s) Topical two times a day  influenza   Vaccine 0.5 milliLiter(s) IntraMuscular once  ketoconazole 2% Cream 1 Application(s) Topical two times a day  ketoconazole 2% Shampoo 1 Application(s) Topical <User Schedule>  lactated ringers. 1000 milliLiter(s) IV Continuous <Continuous>  methylPREDNISolone sodium succinate Injectable 20 milliGRAM(s) IV Push every 8 hours  pantoprazole    Tablet 40 milliGRAM(s) Oral before breakfast      PMHX/PSHX:  Ulcerative colitis without complications, unspecified location  No significant past surgical history      Family history:  No pertinent family history in first degree relatives          PHYSICAL EXAM:     GENERAL:  Appears stated age, well-groomed, well-nourished, no distress  HEENT:  NC/AT,  conjunctivae clear, sclera -anicteric  CHEST:  Full & symmetric excursion, no increased  HEART:  Regular rhythm  ABDOMEN:  Soft, non-tender, non-distended, normoactive bowel sounds,  no masses ,no hepato-splenomegaly,   EXTREMITIES:  no cyanosis,clubbing or edema  SKIN:  No rash/erythema/ecchymoses/petechiae/wounds/abscess/warm/dry  NEURO:  Alert, oriented    Vital Signs:  Vital Signs Last 24 Hrs  T(C): 36.8 (12 Feb 2019 04:57), Max: 36.8 (12 Feb 2019 04:57)  T(F): 98.2 (12 Feb 2019 04:57), Max: 98.2 (12 Feb 2019 04:57)  HR: 79 (12 Feb 2019 04:57) (75 - 86)  BP: 117/79 (12 Feb 2019 04:57) (116/78 - 123/90)  BP(mean): --  RR: 18 (12 Feb 2019 04:57) (18 - 18)  SpO2: 99% (12 Feb 2019 04:57) (99% - 100%)  Daily     Daily     LABS:                        8.4    14.1  )-----------( 428      ( 11 Feb 2019 06:25 )             26.9     02-12    140  |  103  |  15  ----------------------------<  113<H>  3.8   |  24  |  0.97    Ca    8.7      12 Feb 2019 06:59                Imaging: Chief Complaint:  Patient is a 38y old  Male who presents with a chief complaint of UC (11 Feb 2019 15:42)      Interval Events:   Yesterday patient stated that his diarrhea was improving.  Today he is starting to have slightly formed stool and has had about 6 BM's in 24 hours.    Allergies:  No Known Allergies      Hospital Medications:  clotrimazole 1% Cream 1 Application(s) Topical two times a day  heparin  Injectable 5000 Unit(s) SubCutaneous every 8 hours  hydrocortisone 2.5% Ointment 1 Application(s) Topical two times a day  influenza   Vaccine 0.5 milliLiter(s) IntraMuscular once  ketoconazole 2% Cream 1 Application(s) Topical two times a day  ketoconazole 2% Shampoo 1 Application(s) Topical <User Schedule>  lactated ringers. 1000 milliLiter(s) IV Continuous <Continuous>  methylPREDNISolone sodium succinate Injectable 20 milliGRAM(s) IV Push every 8 hours  pantoprazole    Tablet 40 milliGRAM(s) Oral before breakfast      PMHX/PSHX:  Ulcerative colitis without complications, unspecified location  No significant past surgical history      Family history:  No pertinent family history in first degree relatives          PHYSICAL EXAM:     GENERAL:  Appears stated age, well-groomed, well-nourished, no distress  HEENT:  NC/AT,  conjunctivae clear, sclera -anicteric  CHEST:  Full & symmetric excursion, no increased  HEART:  Regular rhythm  ABDOMEN:  Soft, non-tender, non-distended, normoactive bowel sounds,  no masses ,no hepato-splenomegaly,   EXTREMITIES:  no cyanosis,clubbing or edema  SKIN:  No rash/erythema/ecchymoses/petechiae/wounds/abscess/warm/dry  NEURO:  Alert, oriented    Vital Signs:  Vital Signs Last 24 Hrs  T(C): 36.8 (12 Feb 2019 04:57), Max: 36.8 (12 Feb 2019 04:57)  T(F): 98.2 (12 Feb 2019 04:57), Max: 98.2 (12 Feb 2019 04:57)  HR: 79 (12 Feb 2019 04:57) (75 - 86)  BP: 117/79 (12 Feb 2019 04:57) (116/78 - 123/90)  BP(mean): --  RR: 18 (12 Feb 2019 04:57) (18 - 18)  SpO2: 99% (12 Feb 2019 04:57) (99% - 100%)  Daily     Daily     LABS:                        8.4    14.1  )-----------( 428      ( 11 Feb 2019 06:25 )             26.9     02-12    140  |  103  |  15  ----------------------------<  113<H>  3.8   |  24  |  0.97    Ca    8.7      12 Feb 2019 06:59                Imaging:

## 2019-02-12 NOTE — PROGRESS NOTE ADULT - SUBJECTIVE AND OBJECTIVE BOX
Subjective: Patient seen and examined. No new events except as noted.     REVIEW OF SYSTEMS:    CONSTITUTIONAL: No weakness, fevers or chills  EYES/ENT: No visual changes;  No vertigo or throat pain   NECK: No pain or stiffness  RESPIRATORY: No cough, wheezing, hemoptysis; No shortness of breath  CARDIOVASCULAR: No chest pain or palpitations  GASTROINTESTINAL: No abdominal or epigastric pain. No nausea, vomiting, or hematemesis; No diarrhea or constipation. No melena or hematochezia.  GENITOURINARY: No dysuria, frequency or hematuria  NEUROLOGICAL: No numbness or weakness  SKIN: No itching, burning, rashes, or lesions   All other review of systems is negative unless indicated above.    MEDICATIONS:  MEDICATIONS  (STANDING):  clotrimazole 1% Cream 1 Application(s) Topical two times a day  heparin  Injectable 5000 Unit(s) SubCutaneous every 8 hours  hydrocortisone 2.5% Ointment 1 Application(s) Topical two times a day  influenza   Vaccine 0.5 milliLiter(s) IntraMuscular once  ketoconazole 2% Cream 1 Application(s) Topical two times a day  ketoconazole 2% Shampoo 1 Application(s) Topical <User Schedule>  lactated ringers. 1000 milliLiter(s) (125 mL/Hr) IV Continuous <Continuous>  methylPREDNISolone sodium succinate Injectable 20 milliGRAM(s) IV Push every 8 hours  pantoprazole    Tablet 40 milliGRAM(s) Oral before breakfast      PHYSICAL EXAM:  T(C): 36.8 (02-12-19 @ 12:15), Max: 36.8 (02-12-19 @ 04:57)  HR: 82 (02-12-19 @ 12:15) (79 - 86)  BP: 117/78 (02-12-19 @ 12:15) (117/78 - 123/90)  RR: 18 (02-12-19 @ 12:15) (18 - 18)  SpO2: 100% (02-12-19 @ 12:15) (99% - 100%)  Wt(kg): --  I&O's Summary    11 Feb 2019 07:01  -  12 Feb 2019 07:00  --------------------------------------------------------  IN: 2760 mL / OUT: 0 mL / NET: 2760 mL    12 Feb 2019 07:01  -  12 Feb 2019 16:26  --------------------------------------------------------  IN: 720 mL / OUT: 0 mL / NET: 720 mL          Appearance: Normal	  HEENT:   Normal oral mucosa, PERRL, EOMI	  Lymphatic: No lymphadenopathy , no edema  Cardiovascular: Normal S1 S2, No JVD, No murmurs , Peripheral pulses palpable 2+ bilaterally  Respiratory: Lungs clear to auscultation, normal effort 	  Gastrointestinal:  Soft, Non-tender, + BS	  Skin: No rashes, No ecchymoses, No cyanosis, warm to touch  Musculoskeletal: Normal range of motion, normal strength  Psychiatry:  Mood & affect appropriate  Ext: No edema      All labs, Imaging and EKGs personally reviewed                         8.2    13.70 )-----------( 509      ( 12 Feb 2019 07:34 )             27.0               02-12    140  |  103  |  15  ----------------------------<  113<H>  3.8   |  24  |  0.97    Ca    8.7      12 Feb 2019 06:59

## 2019-02-12 NOTE — PROVIDER CONTACT NOTE (MEDICATION) - ASSESSMENT
Patient is alert and orientedx4, no acute distress noted. Vital signs stable. Patient understands what medication is for

## 2019-02-12 NOTE — PROGRESS NOTE ADULT - ATTENDING COMMENTS
Patient seen and examined. Agree with above. At baseline, patient has about 8 BMs a day. Overall on IV steroids patient is feeling his stool frequency is near baseline, and pain is better. Would continue for now. He has failed Humira in the past and is currently being loaded with Entyvio, next dose (3rd dose) is later this month. If patient remains stable near his baseline with BM/abdominal pain, will consider switching to PO prednisone soon.

## 2019-02-12 NOTE — PROVIDER CONTACT NOTE (MEDICATION) - BACKGROUND
Patient is a 39YO male admitted with ulcerative colitis. PMH includes Ulcerative colitis, Tinea versicolor

## 2019-02-13 DIAGNOSIS — D72.829 ELEVATED WHITE BLOOD CELL COUNT, UNSPECIFIED: ICD-10-CM

## 2019-02-13 LAB
ANION GAP SERPL CALC-SCNC: 10 MMOL/L — SIGNIFICANT CHANGE UP (ref 5–17)
BUN SERPL-MCNC: 22 MG/DL — SIGNIFICANT CHANGE UP (ref 7–23)
CALCIUM SERPL-MCNC: 8.6 MG/DL — SIGNIFICANT CHANGE UP (ref 8.4–10.5)
CHLORIDE SERPL-SCNC: 105 MMOL/L — SIGNIFICANT CHANGE UP (ref 96–108)
CO2 SERPL-SCNC: 25 MMOL/L — SIGNIFICANT CHANGE UP (ref 22–31)
CREAT SERPL-MCNC: 0.97 MG/DL — SIGNIFICANT CHANGE UP (ref 0.5–1.3)
GLUCOSE SERPL-MCNC: 120 MG/DL — HIGH (ref 70–99)
HCT VFR BLD CALC: 26.9 % — LOW (ref 39–50)
HGB BLD-MCNC: 8.1 G/DL — LOW (ref 13–17)
MCHC RBC-ENTMCNC: 19.4 PG — LOW (ref 27–34)
MCHC RBC-ENTMCNC: 30.1 GM/DL — LOW (ref 32–36)
MCV RBC AUTO: 64.5 FL — LOW (ref 80–100)
PLATELET # BLD AUTO: 528 K/UL — HIGH (ref 150–400)
POTASSIUM SERPL-MCNC: 3.8 MMOL/L — SIGNIFICANT CHANGE UP (ref 3.5–5.3)
POTASSIUM SERPL-SCNC: 3.8 MMOL/L — SIGNIFICANT CHANGE UP (ref 3.5–5.3)
RBC # BLD: 4.17 M/UL — LOW (ref 4.2–5.8)
RBC # FLD: 18.6 % — HIGH (ref 10.3–14.5)
SODIUM SERPL-SCNC: 140 MMOL/L — SIGNIFICANT CHANGE UP (ref 135–145)
WBC # BLD: 15.72 K/UL — HIGH (ref 3.8–10.5)
WBC # FLD AUTO: 15.72 K/UL — HIGH (ref 3.8–10.5)

## 2019-02-13 PROCEDURE — 99223 1ST HOSP IP/OBS HIGH 75: CPT

## 2019-02-13 PROCEDURE — 99232 SBSQ HOSP IP/OBS MODERATE 35: CPT | Mod: GC

## 2019-02-13 RX ORDER — LOPERAMIDE HCL 2 MG
2 TABLET ORAL
Qty: 0 | Refills: 0 | Status: DISCONTINUED | OUTPATIENT
Start: 2019-02-13 | End: 2019-02-17

## 2019-02-13 RX ORDER — MAGNESIUM OXIDE 400 MG ORAL TABLET 241.3 MG
400 TABLET ORAL ONCE
Qty: 0 | Refills: 0 | Status: COMPLETED | OUTPATIENT
Start: 2019-02-13 | End: 2019-02-13

## 2019-02-13 RX ORDER — POTASSIUM CHLORIDE 20 MEQ
20 PACKET (EA) ORAL ONCE
Qty: 0 | Refills: 0 | Status: COMPLETED | OUTPATIENT
Start: 2019-02-13 | End: 2019-02-13

## 2019-02-13 RX ORDER — FERROUS SULFATE 325(65) MG
325 TABLET ORAL DAILY
Qty: 0 | Refills: 0 | Status: DISCONTINUED | OUTPATIENT
Start: 2019-02-13 | End: 2019-02-17

## 2019-02-13 RX ADMIN — Medication 325 MILLIGRAM(S): at 13:09

## 2019-02-13 RX ADMIN — SODIUM CHLORIDE 125 MILLILITER(S): 9 INJECTION, SOLUTION INTRAVENOUS at 09:17

## 2019-02-13 RX ADMIN — PANTOPRAZOLE SODIUM 40 MILLIGRAM(S): 20 TABLET, DELAYED RELEASE ORAL at 05:32

## 2019-02-13 RX ADMIN — MAGNESIUM OXIDE 400 MG ORAL TABLET 400 MILLIGRAM(S): 241.3 TABLET ORAL at 17:45

## 2019-02-13 RX ADMIN — Medication 1 TABLET(S): at 13:09

## 2019-02-13 RX ADMIN — KETOCONAZOLE 1 APPLICATION(S): 20 AEROSOL, FOAM TOPICAL at 17:45

## 2019-02-13 RX ADMIN — Medication 1 APPLICATION(S): at 17:45

## 2019-02-13 RX ADMIN — Medication 1 APPLICATION(S): at 05:35

## 2019-02-13 RX ADMIN — Medication 1 APPLICATION(S): at 05:33

## 2019-02-13 RX ADMIN — Medication 20 MILLIGRAM(S): at 05:32

## 2019-02-13 RX ADMIN — Medication 20 MILLIEQUIVALENT(S): at 13:09

## 2019-02-13 RX ADMIN — Medication 100 MILLIGRAM(S): at 05:31

## 2019-02-13 RX ADMIN — Medication 20 MILLIGRAM(S): at 13:10

## 2019-02-13 RX ADMIN — KETOCONAZOLE 1 APPLICATION(S): 20 AEROSOL, FOAM TOPICAL at 05:35

## 2019-02-13 RX ADMIN — Medication 20 MILLIGRAM(S): at 22:02

## 2019-02-13 NOTE — PROGRESS NOTE ADULT - SUBJECTIVE AND OBJECTIVE BOX
Subjective: Patient seen and examined. No new events except as noted.   cont to have bloody bowel movement     REVIEW OF SYSTEMS:    CONSTITUTIONAL: No weakness, fevers or chills  EYES/ENT: No visual changes;  No vertigo or throat pain   NECK: No pain or stiffness  RESPIRATORY: No cough, wheezing, hemoptysis; No shortness of breath  CARDIOVASCULAR: No chest pain or palpitations  GASTROINTESTINAL: No abdominal or epigastric pain. No nausea, vomiting, or hematemesis; No diarrhea or constipation. No melena or hematochezia.  GENITOURINARY: No dysuria, frequency or hematuria  NEUROLOGICAL: No numbness or weakness  SKIN: No itching, burning, rashes, or lesions   All other review of systems is negative unless indicated above.    MEDICATIONS:  MEDICATIONS  (STANDING):  calcium carbonate 1250 mG  + Vitamin D (OsCal 500 + D) 1 Tablet(s) Oral daily  clotrimazole 1% Cream 1 Application(s) Topical two times a day  ferrous    sulfate 325 milliGRAM(s) Oral daily  heparin  Injectable 5000 Unit(s) SubCutaneous every 8 hours  hydrocortisone 2.5% Ointment 1 Application(s) Topical two times a day  influenza   Vaccine 0.5 milliLiter(s) IntraMuscular once  ketoconazole 2% Cream 1 Application(s) Topical two times a day  ketoconazole 2% Shampoo 1 Application(s) Topical <User Schedule>  methylPREDNISolone sodium succinate Injectable 20 milliGRAM(s) IV Push every 8 hours  pantoprazole    Tablet 40 milliGRAM(s) Oral before breakfast      PHYSICAL EXAM:  T(C): 36.8 (02-13-19 @ 12:01), Max: 36.8 (02-13-19 @ 12:01)  HR: 85 (02-13-19 @ 12:01) (79 - 90)  BP: 124/90 (02-13-19 @ 12:01) (120/78 - 138/88)  RR: 17 (02-13-19 @ 12:01) (17 - 18)  SpO2: 99% (02-13-19 @ 12:01) (99% - 100%)  Wt(kg): --  I&O's Summary    12 Feb 2019 07:01  -  13 Feb 2019 07:00  --------------------------------------------------------  IN: 4300 mL / OUT: 0 mL / NET: 4300 mL    13 Feb 2019 07:01  -  13 Feb 2019 16:25  --------------------------------------------------------  IN: 440 mL / OUT: 0 mL / NET: 440 mL          Appearance: Normal	  HEENT:   Normal oral mucosa, PERRL, EOMI	  Lymphatic: No lymphadenopathy , no edema  Cardiovascular: Normal S1 S2, No JVD, No murmurs , Peripheral pulses palpable 2+ bilaterally  Respiratory: Lungs clear to auscultation, normal effort 	  Gastrointestinal:  Soft, Non-tender, + BS	  Skin: No rashes, No ecchymoses, No cyanosis, warm to touch  Musculoskeletal: Normal range of motion, normal strength  Psychiatry:  Mood & affect appropriate  Ext: No edema      All labs, Imaging and EKGs personally reviewed                           8.1    15.72 )-----------( 528      ( 13 Feb 2019 08:40 )             26.9               02-13    140  |  105  |  22  ----------------------------<  120<H>  3.8   |  25  |  0.97    Ca    8.6      13 Feb 2019 07:32  Phos  3.6     02-13  Mg     1.7     02-13

## 2019-02-13 NOTE — PROGRESS NOTE ADULT - ASSESSMENT
Impression:  1)Ulcerative colitis flare s/p colonoscopy with biopsies showing severe colitis now with some symptomatic improvement on IV steroids.      Recommendations:  - f/u pathology from colonoscopy to r/o CMV  - can attempt switching to oral prednisone 40 mg daily today  - regular low fiber diet as tolerated, nutrition evaluation  - DVT ppx as patient is hypercoagulable with IBD    Lis Rabago, PGY-4  Gastroenterology Fellow  Pager x [phone] or [phone]  (After 5 pm or on weekends please page GI on call) Impression:  1)Ulcerative colitis flare s/p colonoscopy with biopsies showing severe colitis now with some symptomatic improvement on IV steroids.      Recommendations:  - f/u pathology from colonoscopy to r/o CMV  - can continue IV steroids today and attempt switching to oral prednisone 40 mg daily tomorrow  - regular low fiber diet as tolerated, nutrition evaluation  - DVT ppx as patient is hypercoagulable with IBD    Lis Rabago, PGY-4  Gastroenterology Fellow  Pager x [phone] or [phone]  (After 5 pm or on weekends please page GI on call) Impression:  1)Ulcerative colitis flare s/p colonoscopy with biopsies showing severe colitis now with some symptomatic improvement on IV steroids.      Recommendations:  - f/u pathology from colonoscopy to r/o CMV  - continue IV steroids today; as patient still is not near baseline, would not switch to oral steroids today yet; will re-assess tomorrow  - He is already getting Entyvio and is on course to receive 3rd loading dose later this month  - regular low fiber diet as tolerated, nutrition evaluation  - DVT ppx as patient is hypercoagulable with IBD  - Hopefully he is put into remission with steroids soon and will continue Entyvio with Dr. Franz as outpatient    Lis Rabago, PGY-4  Gastroenterology Fellow  Pager x [phone] or [phone]  (After 5 pm or on weekends please page GI on call)

## 2019-02-13 NOTE — PROGRESS NOTE ADULT - PROBLEM SELECTOR PLAN 4
? 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

## 2019-02-13 NOTE — PROGRESS NOTE ADULT - PROBLEM SELECTOR PLAN 3
active type and screen  transfuse to keep Hg>7  Iron supplement Acute blood loss anemia secondary to UC   active type and screen  transfuse to keep Hg>7  Iron supplement

## 2019-02-13 NOTE — PROGRESS NOTE ADULT - SUBJECTIVE AND OBJECTIVE BOX
Chief Complaint:  Patient is a 38y old  Male who presents with a chief complaint of UC (12 Feb 2019 16:26)      Interval Events:   Patient with 6 BM's over the lat 24 hours, small amount of blood seen and they are more formed.    Allergies:  No Known Allergies      Hospital Medications:  benzonatate 100 milliGRAM(s) Oral three times a day PRN  clotrimazole 1% Cream 1 Application(s) Topical two times a day  heparin  Injectable 5000 Unit(s) SubCutaneous every 8 hours  hydrocortisone 2.5% Ointment 1 Application(s) Topical two times a day  influenza   Vaccine 0.5 milliLiter(s) IntraMuscular once  ketoconazole 2% Cream 1 Application(s) Topical two times a day  ketoconazole 2% Shampoo 1 Application(s) Topical <User Schedule>  lactated ringers. 1000 milliLiter(s) IV Continuous <Continuous>  methylPREDNISolone sodium succinate Injectable 20 milliGRAM(s) IV Push every 8 hours  pantoprazole    Tablet 40 milliGRAM(s) Oral before breakfast      PMHX/PSHX:  Ulcerative colitis without complications, unspecified location  No significant past surgical history      Family history:  No pertinent family history in first degree relatives          PHYSICAL EXAM:     GENERAL:  Appears stated age, well-groomed, well-nourished, no distress  HEENT:  NC/AT,  conjunctivae clear, sclera -anicteric  CHEST:  Full & symmetric excursion, no increased  HEART:  Regular rhythm  ABDOMEN:  Soft, non-tender, non-distended, normoactive bowel sounds,  no masses ,no hepato-splenomegaly,   EXTREMITIES:  no cyanosis,clubbing or edema  SKIN:  No rash/erythema/ecchymoses/petechiae/wounds/abscess/warm/dry  NEURO:  Alert, oriented    Vital Signs:  Vital Signs Last 24 Hrs  T(C): 36.7 (13 Feb 2019 05:10), Max: 36.8 (12 Feb 2019 12:15)  T(F): 98.1 (13 Feb 2019 05:10), Max: 98.2 (12 Feb 2019 12:15)  HR: 79 (13 Feb 2019 05:10) (79 - 90)  BP: 120/78 (13 Feb 2019 05:10) (117/78 - 138/88)  BP(mean): --  RR: 17 (13 Feb 2019 05:10) (17 - 18)  SpO2: 99% (13 Feb 2019 05:10) (99% - 100%)  Daily     Daily     LABS:                        8.2    13.70 )-----------( 509      ( 12 Feb 2019 07:34 )             27.0     02-13    140  |  105  |  22  ----------------------------<  120<H>  3.8   |  25  |  0.97    Ca    8.6      13 Feb 2019 07:32                Imaging: Chief Complaint:  Patient is a 38y old  Male who presents with a chief complaint of UC (12 Feb 2019 16:26)      Interval Events:   Patient with 6 BM's over the last 24 hours, small amount of blood seen and they are more formed. However the amount and consistency of stools now is not patient's baseline.    Allergies:  No Known Allergies      Hospital Medications:  benzonatate 100 milliGRAM(s) Oral three times a day PRN  clotrimazole 1% Cream 1 Application(s) Topical two times a day  heparin  Injectable 5000 Unit(s) SubCutaneous every 8 hours  hydrocortisone 2.5% Ointment 1 Application(s) Topical two times a day  influenza   Vaccine 0.5 milliLiter(s) IntraMuscular once  ketoconazole 2% Cream 1 Application(s) Topical two times a day  ketoconazole 2% Shampoo 1 Application(s) Topical <User Schedule>  lactated ringers. 1000 milliLiter(s) IV Continuous <Continuous>  methylPREDNISolone sodium succinate Injectable 20 milliGRAM(s) IV Push every 8 hours  pantoprazole    Tablet 40 milliGRAM(s) Oral before breakfast      PMHX/PSHX:  Ulcerative colitis without complications, unspecified location  No significant past surgical history      Family history:  No pertinent family history in first degree relatives          PHYSICAL EXAM:     GENERAL:  Appears stated age, well-groomed, well-nourished, no distress  HEENT:  NC/AT,  conjunctivae clear, sclera -anicteric  CHEST:  Full & symmetric excursion, no increased  HEART:  Regular rhythm  ABDOMEN:  Soft, non-tender, non-distended, normoactive bowel sounds,  no masses ,no hepato-splenomegaly,   EXTREMITIES:  no cyanosis,clubbing or edema  SKIN:  No rash/erythema/ecchymoses/petechiae/wounds/abscess/warm/dry  NEURO:  Alert, oriented    Vital Signs:  Vital Signs Last 24 Hrs  T(C): 36.7 (13 Feb 2019 05:10), Max: 36.8 (12 Feb 2019 12:15)  T(F): 98.1 (13 Feb 2019 05:10), Max: 98.2 (12 Feb 2019 12:15)  HR: 79 (13 Feb 2019 05:10) (79 - 90)  BP: 120/78 (13 Feb 2019 05:10) (117/78 - 138/88)  BP(mean): --  RR: 17 (13 Feb 2019 05:10) (17 - 18)  SpO2: 99% (13 Feb 2019 05:10) (99% - 100%)  Daily     Daily     LABS:                        8.2    13.70 )-----------( 509      ( 12 Feb 2019 07:34 )             27.0     02-13    140  |  105  |  22  ----------------------------<  120<H>  3.8   |  25  |  0.97    Ca    8.6      13 Feb 2019 07:32

## 2019-02-14 LAB
BLD GP AB SCN SERPL QL: NEGATIVE — SIGNIFICANT CHANGE UP
HCT VFR BLD CALC: 26.8 % — LOW (ref 39–50)
HGB BLD-MCNC: 7.8 G/DL — LOW (ref 13–17)
MCHC RBC-ENTMCNC: 18.5 PG — LOW (ref 27–34)
MCHC RBC-ENTMCNC: 29.1 GM/DL — LOW (ref 32–36)
MCV RBC AUTO: 63.5 FL — LOW (ref 80–100)
PLATELET # BLD AUTO: 509 K/UL — HIGH (ref 150–400)
RBC # BLD: 4.22 M/UL — SIGNIFICANT CHANGE UP (ref 4.2–5.8)
RBC # FLD: 18.8 % — HIGH (ref 10.3–14.5)
RH IG SCN BLD-IMP: POSITIVE — SIGNIFICANT CHANGE UP
SURGICAL PATHOLOGY STUDY: SIGNIFICANT CHANGE UP
WBC # BLD: 16.84 K/UL — HIGH (ref 3.8–10.5)
WBC # FLD AUTO: 16.84 K/UL — HIGH (ref 3.8–10.5)

## 2019-02-14 PROCEDURE — 99232 SBSQ HOSP IP/OBS MODERATE 35: CPT | Mod: GC

## 2019-02-14 RX ORDER — SODIUM CHLORIDE 0.65 %
1 AEROSOL, SPRAY (ML) NASAL
Qty: 0 | Refills: 0 | Status: DISCONTINUED | OUTPATIENT
Start: 2019-02-14 | End: 2019-02-17

## 2019-02-14 RX ORDER — FLUTICASONE PROPIONATE 50 MCG
1 SPRAY, SUSPENSION NASAL
Qty: 0 | Refills: 0 | Status: DISCONTINUED | OUTPATIENT
Start: 2019-02-14 | End: 2019-02-17

## 2019-02-14 RX ADMIN — Medication 20 MILLIGRAM(S): at 06:25

## 2019-02-14 RX ADMIN — KETOCONAZOLE 1 APPLICATION(S): 20 AEROSOL, FOAM TOPICAL at 16:07

## 2019-02-14 RX ADMIN — KETOCONAZOLE 1 APPLICATION(S): 20 AEROSOL, FOAM TOPICAL at 06:25

## 2019-02-14 RX ADMIN — PANTOPRAZOLE SODIUM 40 MILLIGRAM(S): 20 TABLET, DELAYED RELEASE ORAL at 06:25

## 2019-02-14 RX ADMIN — Medication 20 MILLIGRAM(S): at 21:42

## 2019-02-14 RX ADMIN — Medication 1 TABLET(S): at 11:59

## 2019-02-14 RX ADMIN — Medication 325 MILLIGRAM(S): at 11:59

## 2019-02-14 RX ADMIN — KETOCONAZOLE 1 APPLICATION(S): 20 AEROSOL, FOAM TOPICAL at 16:51

## 2019-02-14 RX ADMIN — Medication 1 APPLICATION(S): at 06:25

## 2019-02-14 RX ADMIN — Medication 20 MILLIGRAM(S): at 14:19

## 2019-02-14 RX ADMIN — Medication 1 SPRAY(S): at 17:58

## 2019-02-14 RX ADMIN — Medication 1 APPLICATION(S): at 16:52

## 2019-02-14 RX ADMIN — Medication 1 APPLICATION(S): at 16:51

## 2019-02-14 NOTE — CHART NOTE - NSCHARTNOTEFT_GEN_A_CORE
Noted Hb 7.8/26.8. Pt with intermittent blood streaks in stool. Not with SOB/chest discomfort/dizziness/weakness. Hemodynamically stable; Refusing PRBC transfusion at this time. Apparently had a transfusion reaction in the past. Attending aware. Pt on Iron supplements orally.

## 2019-02-14 NOTE — PROGRESS NOTE ADULT - SUBJECTIVE AND OBJECTIVE BOX
Subjective: Patient seen and examined. No new events except as noted.   cont to have multiple BM pr day     REVIEW OF SYSTEMS:    CONSTITUTIONAL: No weakness, fevers or chills  EYES/ENT: No visual changes;  No vertigo or throat pain   NECK: No pain or stiffness  RESPIRATORY: No cough, wheezing, hemoptysis; No shortness of breath  CARDIOVASCULAR: No chest pain or palpitations  GASTROINTESTINAL: + multiple BM   GENITOURINARY: No dysuria, frequency or hematuria  NEUROLOGICAL: No numbness or weakness  SKIN: No itching, burning, rashes, or lesions   All other review of systems is negative unless indicated above.    MEDICATIONS:  MEDICATIONS  (STANDING):  calcium carbonate 1250 mG  + Vitamin D (OsCal 500 + D) 1 Tablet(s) Oral daily  clotrimazole 1% Cream 1 Application(s) Topical two times a day  ferrous    sulfate 325 milliGRAM(s) Oral daily  fluticasone propionate 50 MICROgram(s)/spray Nasal Spray 1 Spray(s) Both Nostrils two times a day  heparin  Injectable 5000 Unit(s) SubCutaneous every 8 hours  hydrocortisone 2.5% Ointment 1 Application(s) Topical two times a day  influenza   Vaccine 0.5 milliLiter(s) IntraMuscular once  ketoconazole 2% Cream 1 Application(s) Topical two times a day  ketoconazole 2% Shampoo 1 Application(s) Topical <User Schedule>  methylPREDNISolone sodium succinate Injectable 20 milliGRAM(s) IV Push every 8 hours  pantoprazole    Tablet 40 milliGRAM(s) Oral before breakfast  sodium chloride 0.65% Nasal 1 Spray(s) Both Nostrils two times a day      PHYSICAL EXAM:  T(C): 36.4 (02-14-19 @ 12:41), Max: 36.9 (02-13-19 @ 20:36)  HR: 63 (02-14-19 @ 12:41) (63 - 86)  BP: 101/73 (02-14-19 @ 12:41) (101/73 - 122/86)  RR: 18 (02-14-19 @ 12:41) (18 - 18)  SpO2: 100% (02-14-19 @ 12:41) (100% - 100%)  Wt(kg): --  I&O's Summary    13 Feb 2019 07:01  -  14 Feb 2019 07:00  --------------------------------------------------------  IN: 920 mL / OUT: 400 mL / NET: 520 mL    14 Feb 2019 07:01  -  14 Feb 2019 18:03  --------------------------------------------------------  IN: 960 mL / OUT: 0 mL / NET: 960 mL          Appearance: Normal	  HEENT:   Normal oral mucosa, PERRL, EOMI	  Lymphatic: No lymphadenopathy , no edema  Cardiovascular: Normal S1 S2, No JVD, No murmurs , Peripheral pulses palpable 2+ bilaterally  Respiratory: Lungs clear to auscultation, normal effort 	  Gastrointestinal:  Soft, Non-tender, + BS	  Skin: improved neck rash   Musculoskeletal: Normal range of motion, normal strength  Psychiatry:  Mood & affect appropriate  Ext: No edema      All labs, Imaging and EKGs personally reviewed                             7.8    16.84 )-----------( 509      ( 14 Feb 2019 07:58 )             26.8               02-13    140  |  105  |  22  ----------------------------<  120<H>  3.8   |  25  |  0.97    Ca    8.6      13 Feb 2019 07:32  Phos  3.6     02-13  Mg     1.7     02-13

## 2019-02-14 NOTE — PROGRESS NOTE ADULT - SUBJECTIVE AND OBJECTIVE BOX
Chief Complaint:  Patient is a 38y old  Male who presents with a chief complaint of UC (13 Feb 2019 16:25)      Interval Events:   Yesterday patient continued to have 6 BM's a day with some blood, was continues on IV steroids.      Allergies:  No Known Allergies      Hospital Medications:  benzonatate 100 milliGRAM(s) Oral three times a day PRN  calcium carbonate 1250 mG  + Vitamin D (OsCal 500 + D) 1 Tablet(s) Oral daily  clotrimazole 1% Cream 1 Application(s) Topical two times a day  ferrous    sulfate 325 milliGRAM(s) Oral daily  heparin  Injectable 5000 Unit(s) SubCutaneous every 8 hours  hydrocortisone 2.5% Ointment 1 Application(s) Topical two times a day  influenza   Vaccine 0.5 milliLiter(s) IntraMuscular once  ketoconazole 2% Cream 1 Application(s) Topical two times a day  ketoconazole 2% Shampoo 1 Application(s) Topical <User Schedule>  loperamide 2 milliGRAM(s) Oral four times a day PRN  methylPREDNISolone sodium succinate Injectable 20 milliGRAM(s) IV Push every 8 hours  pantoprazole    Tablet 40 milliGRAM(s) Oral before breakfast      PMHX/PSHX:  Ulcerative colitis without complications, unspecified location  No significant past surgical history      Family history:  No pertinent family history in first degree relatives          PHYSICAL EXAM:     GENERAL:  Appears stated age, well-groomed, well-nourished, no distress  HEENT:  NC/AT,  conjunctivae clear, sclera -anicteric  CHEST:  Full & symmetric excursion, no increased  HEART:  Regular rhythm  ABDOMEN:  Soft, non-tender, non-distended, normoactive bowel sounds,  no masses ,no hepato-splenomegaly,   EXTREMITIES:  no cyanosis,clubbing or edema  SKIN:  No rash/erythema/ecchymoses/petechiae/wounds/abscess/warm/dry  NEURO:  Alert, oriented    Vital Signs:  Vital Signs Last 24 Hrs  T(C): 36.9 (14 Feb 2019 04:48), Max: 36.9 (13 Feb 2019 20:36)  T(F): 98.4 (14 Feb 2019 04:48), Max: 98.4 (13 Feb 2019 20:36)  HR: 73 (14 Feb 2019 04:48) (73 - 86)  BP: 118/79 (14 Feb 2019 04:48) (118/79 - 124/90)  BP(mean): --  RR: 18 (14 Feb 2019 04:48) (17 - 18)  SpO2: 100% (14 Feb 2019 04:48) (99% - 100%)  Daily     Daily     LABS:                        8.1    15.72 )-----------( 528      ( 13 Feb 2019 08:40 )             26.9     02-13    140  |  105  |  22  ----------------------------<  120<H>  3.8   |  25  |  0.97    Ca    8.6      13 Feb 2019 07:32  Phos  3.6     02-13  Mg     1.7     02-13                Imaging: Chief Complaint:  Patient is a 38y old  Male who presents with a chief complaint of UC (13 Feb 2019 16:25)      Interval Events:   Yesterday patient continued to have 6 BM's a day but the last few bowel movements were without any blood. Continues on IV steroids.      Allergies:  No Known Allergies      Hospital Medications:  benzonatate 100 milliGRAM(s) Oral three times a day PRN  calcium carbonate 1250 mG  + Vitamin D (OsCal 500 + D) 1 Tablet(s) Oral daily  clotrimazole 1% Cream 1 Application(s) Topical two times a day  ferrous    sulfate 325 milliGRAM(s) Oral daily  heparin  Injectable 5000 Unit(s) SubCutaneous every 8 hours  hydrocortisone 2.5% Ointment 1 Application(s) Topical two times a day  influenza   Vaccine 0.5 milliLiter(s) IntraMuscular once  ketoconazole 2% Cream 1 Application(s) Topical two times a day  ketoconazole 2% Shampoo 1 Application(s) Topical <User Schedule>  loperamide 2 milliGRAM(s) Oral four times a day PRN  methylPREDNISolone sodium succinate Injectable 20 milliGRAM(s) IV Push every 8 hours  pantoprazole    Tablet 40 milliGRAM(s) Oral before breakfast      PMHX/PSHX:  Ulcerative colitis without complications, unspecified location  No significant past surgical history      Family history:  No pertinent family history in first degree relatives          PHYSICAL EXAM:     GENERAL:  Appears stated age, well-groomed, well-nourished, no distress  HEENT:  NC/AT,  conjunctivae clear, sclera -anicteric  CHEST:  Full & symmetric excursion, no increased  HEART:  Regular rhythm  ABDOMEN:  Soft, non-tender, non-distended, normoactive bowel sounds,  no masses ,no hepato-splenomegaly,   EXTREMITIES:  no cyanosis,clubbing or edema  SKIN:  No rash/erythema/ecchymoses/petechiae/wounds/abscess/warm/dry  NEURO:  Alert, oriented    Vital Signs:  Vital Signs Last 24 Hrs  T(C): 36.9 (14 Feb 2019 04:48), Max: 36.9 (13 Feb 2019 20:36)  T(F): 98.4 (14 Feb 2019 04:48), Max: 98.4 (13 Feb 2019 20:36)  HR: 73 (14 Feb 2019 04:48) (73 - 86)  BP: 118/79 (14 Feb 2019 04:48) (118/79 - 124/90)  BP(mean): --  RR: 18 (14 Feb 2019 04:48) (17 - 18)  SpO2: 100% (14 Feb 2019 04:48) (99% - 100%)  Daily     Daily     LABS:                        8.1    15.72 )-----------( 528      ( 13 Feb 2019 08:40 )             26.9     02-13    140  |  105  |  22  ----------------------------<  120<H>  3.8   |  25  |  0.97    Ca    8.6      13 Feb 2019 07:32  Phos  3.6     02-13  Mg     1.7     02-13                Imaging:

## 2019-02-14 NOTE — PROGRESS NOTE ADULT - ASSESSMENT
Impression:  1)Ulcerative colitis flare s/p colonoscopy with biopsies showing severe colitis now with some symptomatic improvement on IV steroids.      Recommendations:  - f/u pathology from colonoscopy to r/o CMV  - He is already getting Entyvio and is on course to receive 3rd loading dose later this month  - regular low fiber diet as tolerated, nutrition evaluation  - DVT ppx as patient is hypercoagulable with IBD  - Hopefully he is put into remission with steroids soon and will continue Entyvio with Dr. Franz as outpatient    Lis Rabago, PGY-4  Gastroenterology Fellow  Pager x [phone] or [phone]  (After 5 pm or on weekends please page GI on call) Impression:  1)Ulcerative colitis flare s/p colonoscopy with biopsies showing severe colitis now with some symptomatic improvement on IV steroids.      Recommendations:  - f/u pathology from colonoscopy to r/o CMV  - He is already getting Entyvio and is on course to receive 3rd loading dose later this month  - regular low fiber diet as tolerated, nutrition evaluation  - DVT ppx as patient is hypercoagulable with IBD  - His baseline frequency of bowel movements is at least 4-5 a day; if still no blood tomorrow, plan to switch to oral steroids tomorrow prednisone 40mg daily to be tapered as outpatient    Lis Rabago, PGY-4  Gastroenterology Fellow  Pager x [phone] or [phone]  (After 5 pm or on weekends please page GI on call)

## 2019-02-15 LAB
HCT VFR BLD CALC: 28.2 % — LOW (ref 39–50)
HGB BLD-MCNC: 8.6 G/DL — LOW (ref 13–17)
MCHC RBC-ENTMCNC: 19.5 PG — LOW (ref 27–34)
MCHC RBC-ENTMCNC: 30.5 GM/DL — LOW (ref 32–36)
MCV RBC AUTO: 63.9 FL — LOW (ref 80–100)
PLATELET # BLD AUTO: 585 K/UL — HIGH (ref 150–400)
RBC # BLD: 4.41 M/UL — SIGNIFICANT CHANGE UP (ref 4.2–5.8)
RBC # FLD: 18.4 % — HIGH (ref 10.3–14.5)
WBC # BLD: 17.14 K/UL — HIGH (ref 3.8–10.5)
WBC # FLD AUTO: 17.14 K/UL — HIGH (ref 3.8–10.5)

## 2019-02-15 PROCEDURE — 99232 SBSQ HOSP IP/OBS MODERATE 35: CPT | Mod: GC

## 2019-02-15 RX ADMIN — Medication 1 APPLICATION(S): at 06:02

## 2019-02-15 RX ADMIN — Medication 1 APPLICATION(S): at 17:13

## 2019-02-15 RX ADMIN — KETOCONAZOLE 1 APPLICATION(S): 20 AEROSOL, FOAM TOPICAL at 06:02

## 2019-02-15 RX ADMIN — Medication 1 APPLICATION(S): at 17:14

## 2019-02-15 RX ADMIN — Medication 1 TABLET(S): at 12:47

## 2019-02-15 RX ADMIN — PANTOPRAZOLE SODIUM 40 MILLIGRAM(S): 20 TABLET, DELAYED RELEASE ORAL at 06:03

## 2019-02-15 RX ADMIN — Medication 20 MILLIGRAM(S): at 06:03

## 2019-02-15 RX ADMIN — KETOCONAZOLE 1 APPLICATION(S): 20 AEROSOL, FOAM TOPICAL at 17:13

## 2019-02-15 RX ADMIN — Medication 1 SPRAY(S): at 17:13

## 2019-02-15 RX ADMIN — Medication 325 MILLIGRAM(S): at 12:47

## 2019-02-15 RX ADMIN — Medication 1 SPRAY(S): at 06:03

## 2019-02-15 RX ADMIN — Medication 40 MILLIGRAM(S): at 14:32

## 2019-02-15 NOTE — CHART NOTE - NSCHARTNOTEFT_GEN_A_CORE
Upon Nutritional Assessment by the Registered Dietitian your patient was determined to meet criteria / has evidence of the following diagnosis/diagnoses:          [ ]  Mild Protein Calorie Malnutrition        [ ]  Moderate Protein Calorie Malnutrition        [x] Severe Protein Calorie Malnutrition        [ ] Unspecified Protein Calorie Malnutrition        [ ] Underweight / BMI <19        [ ] Morbid Obesity / BMI > 40      Findings as based on:  [x] Comprehensive nutrition assessment   [ ] Nutrition Focused Physical Exam  [x] Other: PO intake <75% and 12% weight loss x 5 months PTA      Nutrition Plan/Recommendations:    1. Recommend continue low fiber diet.   2. Encourage PO intake and obtain food preferences.  3. Provided education on low fiber, no irritants diet. Left handout.   4. Provided recommendations to help maintain BG while on steroids and to help prevent further muscle/weight loss.   5. Obtain current weight to identify changes if any.       RD remains available,   Tana Lr MS, RDN, #[phone]     PROVIDER Section:     By signing this assessment you are acknowledging and agree with the diagnosis/diagnoses assigned by the Registered Dietitian    Comments:

## 2019-02-15 NOTE — PROGRESS NOTE ADULT - PROBLEM SELECTOR PLAN 1
S/P sigm  awaiting for biopsy results   GI evaluation appreciated  solumedrol IV, DCed, started on prednison 40 daily with plan for discharge with 14 days of prednisone 40 mg daily   F/U GI recs  pain control
S/P sigm  awaiting for biopsy results   GI evaluation appreciated  solumedrol IV, will switch to PO from tomorrow   F/U GI recs  pain control
S/P sigm  awaiting for biopsy results   GI evaluation appreciated  solumedrol started   F/U GI recs  pain control
S/P sigm today  GI evaluation appreciated  solumedrol started   F/U GI recs  pain control
S/P sigm  awaiting for biopsy results   GI evaluation appreciated  solumedrol IV, cont   F/U GI recs  pain control
S/P sigm  awaiting for biopsy results   GI evaluation appreciated  solumedrol IV, cont   F/U GI recs  pain control

## 2019-02-15 NOTE — DIETITIAN INITIAL EVALUATION ADULT. - NS AS NUTRI INTERV COLLABORAT
Obtain current weight to identify changes if any. Malnutrition notification placed in chart - spoke to provider. Obtain current weight to identify changes if any.

## 2019-02-15 NOTE — PROGRESS NOTE ADULT - PROBLEM SELECTOR PROBLEM 3
Anemia
CORNELL (acute kidney injury)
CORNELL (acute kidney injury)
Skin rash
Anemia

## 2019-02-15 NOTE — DIETITIAN INITIAL EVALUATION ADULT. - NS AS NUTRI INTERV MEALS SNACK
Recommend continue low fiber diet. Encourage PO intake and obtain food preferences, Recommend continue low fiber diet. Encourage PO intake and obtain food preferences.

## 2019-02-15 NOTE — PROGRESS NOTE ADULT - SUBJECTIVE AND OBJECTIVE BOX
Subjective: Patient seen and examined. No new events except as noted.   decrease # of BM     REVIEW OF SYSTEMS:    CONSTITUTIONAL: No weakness, fevers or chills  EYES/ENT: No visual changes;  No vertigo or throat pain   NECK: No pain or stiffness  RESPIRATORY: No cough, wheezing, hemoptysis; No shortness of breath  CARDIOVASCULAR: No chest pain or palpitations  GASTROINTESTINAL: No abdominal or epigastric pain. No nausea, vomiting, or hematemesis; No diarrhea or constipation. No melena or hematochezia.  GENITOURINARY: No dysuria, frequency or hematuria  NEUROLOGICAL: No numbness or weakness  SKIN: No itching, burning, rashes, or lesions   All other review of systems is negative unless indicated above.    MEDICATIONS:  MEDICATIONS  (STANDING):  calcium carbonate 1250 mG  + Vitamin D (OsCal 500 + D) 1 Tablet(s) Oral daily  clotrimazole 1% Cream 1 Application(s) Topical two times a day  ferrous    sulfate 325 milliGRAM(s) Oral daily  fluticasone propionate 50 MICROgram(s)/spray Nasal Spray 1 Spray(s) Both Nostrils two times a day  heparin  Injectable 5000 Unit(s) SubCutaneous every 8 hours  hydrocortisone 2.5% Ointment 1 Application(s) Topical two times a day  influenza   Vaccine 0.5 milliLiter(s) IntraMuscular once  ketoconazole 2% Cream 1 Application(s) Topical two times a day  ketoconazole 2% Shampoo 1 Application(s) Topical <User Schedule>  pantoprazole    Tablet 40 milliGRAM(s) Oral before breakfast  predniSONE   Tablet 40 milliGRAM(s) Oral daily  sodium chloride 0.65% Nasal 1 Spray(s) Both Nostrils two times a day      PHYSICAL EXAM:  T(C): 36.8 (02-15-19 @ 11:42), Max: 36.8 (02-15-19 @ 11:42)  HR: 65 (02-15-19 @ 11:42) (62 - 97)  BP: 120/79 (02-15-19 @ 11:42) (109/69 - 124/85)  RR: 17 (02-15-19 @ 11:42) (17 - 18)  SpO2: 99% (02-15-19 @ 11:42) (99% - 100%)  Wt(kg): --  I&O's Summary    14 Feb 2019 07:01  -  15 Feb 2019 07:00  --------------------------------------------------------  IN: 1080 mL / OUT: 0 mL / NET: 1080 mL    15 Feb 2019 07:01  -  15 Feb 2019 14:56  --------------------------------------------------------  IN: 240 mL / OUT: 0 mL / NET: 240 mL          Appearance: Normal	  HEENT:   Normal oral mucosa, PERRL, EOMI	  Lymphatic: No lymphadenopathy , no edema  Cardiovascular: Normal S1 S2, No JVD, No murmurs , Peripheral pulses palpable 2+ bilaterally  Respiratory: Lungs clear to auscultation, normal effort 	  Gastrointestinal:  Soft, Non-tender, + BS	  Skin: No rashes, No ecchymoses, No cyanosis, warm to touch  Musculoskeletal: Normal range of motion, normal strength  Psychiatry:  Mood & affect appropriate  Ext: No edema      All labs, Imaging and EKGs personally reviewed                           8.6    17.14 )-----------( 585      ( 15 Feb 2019 08:02 )             28.2

## 2019-02-15 NOTE — DIETITIAN INITIAL EVALUATION ADULT. - ORAL INTAKE PTA
Pt reports good appetite and PO intake at home. Confirms NKFA./good poor/Pt reports poor appetite and PO intake x 5 months PTA due to colitis, states consuming <50% of meals. Confirms NKFA.

## 2019-02-15 NOTE — DIETITIAN INITIAL EVALUATION ADULT. - ENERGY NEEDS
Ht: 65 inches Wt: 153.4 pounds BMI: 25.5 kg/m2 IBW: 136 (+/-10%) 112.5 %IBW  Pertinent information: Pt 39 y/o M with PMH: ulcerative colitis x 5 years, admitted with bloody BM, ulcerative colitis, S/P colonoscopy with biopsy on (02/08) showing severe colitis, awaiting biopsy results, CORNELL due to dehydration.   Noted +2 bilateral arm, leg, and ankle edema as per flow sheets. Skin: no noted pressure injuries as per documentation.

## 2019-02-15 NOTE — DIETITIAN INITIAL EVALUATION ADULT. - NS FNS WEIGHT CHANGE REASON
Pt reports weight has been fluctuating in the last 5 years due to ulcerative colitis and steroids. Reports 20 pounds weight loss in the last 5 months PTA due to poor PO intake, from 170 to 150 pounds. Weight as per flow sheets (02/08) 153.4 pounds -?accuracy due to fluid shifts.

## 2019-02-15 NOTE — PROGRESS NOTE ADULT - ATTENDING COMMENTS
Patient seen and examined. Agree with above. Would change steroids to PO prednisone 40mg daily, as patient is at his baseline frequency/consistency of bowel movements. Hopefully he can go home on prednisone PO and continue to receive Entyvio as outpatient, in hopes it will bring him into full remission. Discussed with medicine attending.

## 2019-02-15 NOTE — DIETITIAN INITIAL EVALUATION ADULT. - OTHER INFO
Pt seen for length of stay initial assessment. Pt reports good appetite and PO intake. Noted 100% PO intake as per flow sheets. Denies difficulty chewing/swallowing. Pt denies nausea, vomiting, diarrhea, or constipation, reports last BM today (02/15). Pt seen for length of stay initial assessment. Pt reports good appetite and PO intake during hospitalization due to medical treatment and tolerating low fiber diet. Noted 100% PO intake as per flow sheets. Denies difficulty chewing/swallowing. Pt denies nausea, vomiting, diarrhea, or constipation, reports last BM today (02/15).

## 2019-02-15 NOTE — PROGRESS NOTE ADULT - PROBLEM SELECTOR PROBLEM 1
Ulcerative colitis without complications, unspecified location

## 2019-02-15 NOTE — DIETITIAN INITIAL EVALUATION ADULT. - NS AS NUTRI INTERV ED CONTENT
Provided education on low fiber diet, stressed the importance of this diet to help lessen GI inflammation, reviewed foods high in fiber and foods recommended within therapeutic diet, reviewed foods considered as common GI irritants. Pt amenable for education. RD remains available. 1) Provided education on low fiber, no irritants diet, stressed the importance of this diet to help lessen GI inflammation, reviewed foods high in fiber and foods recommended within therapeutic diet, reviewed foods considered as common GI irritants. Provided handout with education. 2) Encouraged pt to maintain balanced meals while on steroids to help maintain good BG control; reviewed portion sizes, and encouraged protein intake to help prevent further muscle/weight loss. Pt and mom amenable for education. RD remains available.

## 2019-02-15 NOTE — DIETITIAN INITIAL EVALUATION ADULT. - SOURCE
patient/Medical record/other (specify) patient/family/significant other/other (specify)/Medical record; mom at bedside

## 2019-02-15 NOTE — PROGRESS NOTE ADULT - ASSESSMENT
Impression:  1)Ulcerative colitis flare s/p colonoscopy with biopsies showing severe colitis now with some symptomatic improvement on IV steroids.      Recommendations:  - f/u pathology from colonoscopy to r/o CMV  - He is already getting Entyvio and is on course to receive 3rd loading dose later this month  - regular low fiber diet as tolerated, nutrition evaluation  - DVT ppx as patient is hypercoagulable with IBD  - switch to oral steroids tomorrow prednisone 40mg daily to be tapered as outpatient    Lis Rabago, PGY-4  Gastroenterology Fellow  Pager x [phone] or [phone]  (After 5 pm or on weekends please page GI on call) Impression:  1)Ulcerative colitis flare s/p colonoscopy with biopsies showing severe colitis now with some symptomatic improvement on IV steroids.      Recommendations:  - f/u pathology from colonoscopy to r/o CMV (immunostains are pending)  - He is already getting Entyvio and is on course to receive 3rd loading dose later this month  - regular low fiber diet as tolerated, nutrition evaluation  - DVT ppx as patient is hypercoagulable with IBD  - switch to oral steroids tomorrow prednisone 40mg daily to be tapered as outpatient    Lis Rabago, PGY-4  Gastroenterology Fellow  Pager x [phone] or [phone]  (After 5 pm or on weekends please page GI on call)

## 2019-02-15 NOTE — DIETITIAN INITIAL EVALUATION ADULT. - ADHERENCE
good poor/Pt reports not following any type of diet or restriction at home. Reports taking Calcium, Omega 3, Magnesium, Multivitamin, and Vitamin D PTA.

## 2019-02-15 NOTE — PROVIDER CONTACT NOTE (MEDICATION) - BACKGROUND
patient is a 37YO Male admitted with ulcerative colitis without complications. PMH includes anemia, CORNELL, GIB

## 2019-02-15 NOTE — PROGRESS NOTE ADULT - PROBLEM SELECTOR PROBLEM 2
GIB (gastrointestinal bleeding)

## 2019-02-15 NOTE — PROGRESS NOTE ADULT - PROBLEM SELECTOR PLAN 2
UC flair   Gi F/U  IV hydration  active type and screen  patient refused HG PRBC transfusion  Iron supplement
UC flair   Gi F/U  IV hydration  active type and screen
UC flair   Gi F/U  IV hydration  active type and screen  patient refused HG PRBC transfusion  Iron supplement
UC flair   Gi F/U  IV hydration  active type and screen

## 2019-02-15 NOTE — PROGRESS NOTE ADULT - PROVIDER SPECIALTY LIST ADULT
Gastroenterology
Internal Medicine
Gastroenterology
Gastroenterology
Internal Medicine
Internal Medicine

## 2019-02-16 LAB
HCT VFR BLD CALC: 30.7 % — LOW (ref 39–50)
HGB BLD-MCNC: 9.4 G/DL — LOW (ref 13–17)
MCHC RBC-ENTMCNC: 19.7 PG — LOW (ref 27–34)
MCHC RBC-ENTMCNC: 30.6 GM/DL — LOW (ref 32–36)
MCV RBC AUTO: 64.4 FL — LOW (ref 80–100)
PLATELET # BLD AUTO: 621 K/UL — HIGH (ref 150–400)
RBC # BLD: 4.77 M/UL — SIGNIFICANT CHANGE UP (ref 4.2–5.8)
RBC # FLD: 18.7 % — HIGH (ref 10.3–14.5)
WBC # BLD: 29.04 K/UL — HIGH (ref 3.8–10.5)
WBC # FLD AUTO: 29.04 K/UL — HIGH (ref 3.8–10.5)

## 2019-02-16 RX ORDER — ADALIMUMAB 40MG/0.8ML
0 KIT SUBCUTANEOUS
Qty: 0 | Refills: 0 | COMMUNITY

## 2019-02-16 RX ORDER — KETOCONAZOLE 20 MG/G
1 AEROSOL, FOAM TOPICAL
Qty: 120 | Refills: 0 | OUTPATIENT
Start: 2019-02-16 | End: 2019-03-15

## 2019-02-16 RX ORDER — FERROUS SULFATE 325(65) MG
1 TABLET ORAL
Qty: 0 | Refills: 0 | COMMUNITY
Start: 2019-02-16

## 2019-02-16 RX ORDER — KETOCONAZOLE 20 MG/G
1 AEROSOL, FOAM TOPICAL
Qty: 0 | Refills: 0 | COMMUNITY
Start: 2019-02-16

## 2019-02-16 RX ORDER — KETOCONAZOLE 20 MG/G
1 AEROSOL, FOAM TOPICAL
Qty: 60 | Refills: 0 | OUTPATIENT
Start: 2019-02-16 | End: 2019-03-15

## 2019-02-16 RX ORDER — HYDROCORTISONE 1 %
1 OINTMENT (GRAM) TOPICAL
Qty: 30 | Refills: 0 | OUTPATIENT
Start: 2019-02-16 | End: 2019-03-01

## 2019-02-16 RX ORDER — FLUTICASONE PROPIONATE 50 MCG
1 SPRAY, SUSPENSION NASAL
Qty: 0 | Refills: 0 | COMMUNITY
Start: 2019-02-16

## 2019-02-16 RX ORDER — HYDROCORTISONE 1 %
1 OINTMENT (GRAM) TOPICAL
Qty: 0 | Refills: 0 | COMMUNITY
Start: 2019-02-16

## 2019-02-16 RX ADMIN — KETOCONAZOLE 1 APPLICATION(S): 20 AEROSOL, FOAM TOPICAL at 19:01

## 2019-02-16 RX ADMIN — Medication 1 SPRAY(S): at 05:42

## 2019-02-16 RX ADMIN — KETOCONAZOLE 1 APPLICATION(S): 20 AEROSOL, FOAM TOPICAL at 08:39

## 2019-02-16 RX ADMIN — Medication 40 MILLIGRAM(S): at 05:42

## 2019-02-16 RX ADMIN — Medication 1 SPRAY(S): at 19:00

## 2019-02-16 RX ADMIN — Medication 1 SPRAY(S): at 19:01

## 2019-02-16 RX ADMIN — Medication 1 APPLICATION(S): at 19:01

## 2019-02-16 RX ADMIN — KETOCONAZOLE 1 APPLICATION(S): 20 AEROSOL, FOAM TOPICAL at 05:43

## 2019-02-16 RX ADMIN — PANTOPRAZOLE SODIUM 40 MILLIGRAM(S): 20 TABLET, DELAYED RELEASE ORAL at 05:42

## 2019-02-16 RX ADMIN — Medication 325 MILLIGRAM(S): at 12:25

## 2019-02-16 RX ADMIN — Medication 1 APPLICATION(S): at 05:43

## 2019-02-16 RX ADMIN — Medication 1 TABLET(S): at 12:25

## 2019-02-16 RX ADMIN — Medication 1 SPRAY(S): at 05:43

## 2019-02-16 NOTE — PROVIDER CONTACT NOTE (MEDICATION) - RECOMMENDATIONS
Educate patient on the importance of the medication, ensure the patient continues to ambulate as tolerated

## 2019-02-17 VITALS
OXYGEN SATURATION: 99 % | DIASTOLIC BLOOD PRESSURE: 70 MMHG | HEART RATE: 106 BPM | RESPIRATION RATE: 18 BRPM | SYSTOLIC BLOOD PRESSURE: 103 MMHG | TEMPERATURE: 98 F

## 2019-02-17 PROCEDURE — 87449 NOS EACH ORGANISM AG IA: CPT

## 2019-02-17 PROCEDURE — 86901 BLOOD TYPING SEROLOGIC RH(D): CPT

## 2019-02-17 PROCEDURE — 84443 ASSAY THYROID STIM HORMONE: CPT

## 2019-02-17 PROCEDURE — 94640 AIRWAY INHALATION TREATMENT: CPT

## 2019-02-17 PROCEDURE — 86900 BLOOD TYPING SEROLOGIC ABO: CPT

## 2019-02-17 PROCEDURE — 84100 ASSAY OF PHOSPHORUS: CPT

## 2019-02-17 PROCEDURE — 87324 CLOSTRIDIUM AG IA: CPT

## 2019-02-17 PROCEDURE — 83036 HEMOGLOBIN GLYCOSYLATED A1C: CPT

## 2019-02-17 PROCEDURE — 80048 BASIC METABOLIC PNL TOTAL CA: CPT

## 2019-02-17 PROCEDURE — 80061 LIPID PANEL: CPT

## 2019-02-17 PROCEDURE — 83735 ASSAY OF MAGNESIUM: CPT

## 2019-02-17 PROCEDURE — 80053 COMPREHEN METABOLIC PANEL: CPT

## 2019-02-17 PROCEDURE — 88342 IMHCHEM/IMCYTCHM 1ST ANTB: CPT

## 2019-02-17 PROCEDURE — 88305 TISSUE EXAM BY PATHOLOGIST: CPT

## 2019-02-17 PROCEDURE — 36415 COLL VENOUS BLD VENIPUNCTURE: CPT

## 2019-02-17 PROCEDURE — 86850 RBC ANTIBODY SCREEN: CPT

## 2019-02-17 PROCEDURE — 85027 COMPLETE CBC AUTOMATED: CPT

## 2019-02-17 PROCEDURE — 99285 EMERGENCY DEPT VISIT HI MDM: CPT

## 2019-02-17 PROCEDURE — 88341 IMHCHEM/IMCYTCHM EA ADD ANTB: CPT

## 2019-02-17 RX ORDER — FERROUS SULFATE 325(65) MG
1 TABLET ORAL
Qty: 42 | Refills: 0 | OUTPATIENT
Start: 2019-02-17 | End: 2019-03-02

## 2019-02-17 RX ORDER — KETOCONAZOLE 20 MG/G
1 AEROSOL, FOAM TOPICAL
Qty: 60 | Refills: 0 | OUTPATIENT
Start: 2019-02-17 | End: 2019-03-16

## 2019-02-17 RX ORDER — FLUTICASONE PROPIONATE 50 MCG
1 SPRAY, SUSPENSION NASAL
Qty: 1 | Refills: 0 | OUTPATIENT
Start: 2019-02-17

## 2019-02-17 RX ORDER — KETOCONAZOLE 20 MG/G
1 AEROSOL, FOAM TOPICAL
Qty: 120 | Refills: 0 | OUTPATIENT
Start: 2019-02-17 | End: 2019-03-16

## 2019-02-17 RX ORDER — HYDROCORTISONE 1 %
1 OINTMENT (GRAM) TOPICAL
Qty: 30 | Refills: 0 | OUTPATIENT
Start: 2019-02-17 | End: 2019-03-02

## 2019-02-17 RX ORDER — PANTOPRAZOLE SODIUM 20 MG/1
1 TABLET, DELAYED RELEASE ORAL
Qty: 14 | Refills: 0 | OUTPATIENT
Start: 2019-02-17 | End: 2019-03-02

## 2019-02-17 RX ADMIN — Medication 1 APPLICATION(S): at 05:35

## 2019-02-17 RX ADMIN — KETOCONAZOLE 1 APPLICATION(S): 20 AEROSOL, FOAM TOPICAL at 05:35

## 2019-02-17 RX ADMIN — Medication 1 SPRAY(S): at 05:35

## 2019-02-17 RX ADMIN — Medication 1 TABLET(S): at 12:54

## 2019-02-17 RX ADMIN — Medication 325 MILLIGRAM(S): at 12:52

## 2019-02-17 RX ADMIN — PANTOPRAZOLE SODIUM 40 MILLIGRAM(S): 20 TABLET, DELAYED RELEASE ORAL at 05:34

## 2019-02-17 RX ADMIN — Medication 40 MILLIGRAM(S): at 05:37

## 2019-02-17 NOTE — CHART NOTE - NSCHARTNOTEFT_GEN_A_CORE
Gastroenterology Brief Note   - team planning on discharging patient today   - okay to d/c patient on 40 mg prednisone   - patient should f/u with Dr. Franz outpatient in 1-2 weeks

## 2019-02-26 ENCOUNTER — APPOINTMENT (OUTPATIENT)
Dept: GASTROENTEROLOGY | Facility: CLINIC | Age: 39
End: 2019-02-26
Payer: MEDICAID

## 2019-02-26 ENCOUNTER — APPOINTMENT (OUTPATIENT)
Dept: RHEUMATOLOGY | Facility: CLINIC | Age: 39
End: 2019-02-26
Payer: MEDICAID

## 2019-02-26 VITALS
TEMPERATURE: 97.8 F | RESPIRATION RATE: 14 BRPM | SYSTOLIC BLOOD PRESSURE: 122 MMHG | WEIGHT: 147 LBS | OXYGEN SATURATION: 98 % | HEIGHT: 65 IN | DIASTOLIC BLOOD PRESSURE: 82 MMHG | BODY MASS INDEX: 24.49 KG/M2 | HEART RATE: 109 BPM

## 2019-02-26 DIAGNOSIS — T38.0X5A OTHER OSTEOPOROSIS W/OUT CURRENT PATHOLOGICAL FRACTURE: ICD-10-CM

## 2019-02-26 DIAGNOSIS — M81.8 OTHER OSTEOPOROSIS W/OUT CURRENT PATHOLOGICAL FRACTURE: ICD-10-CM

## 2019-02-26 PROCEDURE — 96413 CHEMO IV INFUSION 1 HR: CPT

## 2019-02-26 PROCEDURE — 99214 OFFICE O/P EST MOD 30 MIN: CPT

## 2019-02-26 NOTE — REASON FOR VISIT
[Post Hospitalization] : a post hospitalization visit [Parent] : parent [FreeTextEntry1] : Ulcerative colitis

## 2019-02-26 NOTE — ASSESSMENT
[FreeTextEntry1] : 38-year-old male severe ulcerative colitis, continued disease activity confirmed by colonoscopy at hospital\par  loss of therapeutic response to Humira. \par Dr Franz came in answered all his questions. Recommended to continue Entyvio infusion. \par Informed him to alysia off prednisone slowly.\par Informed him the risks of prednisone therapy including but not limited to spontaneous bone fracture, osteoporosis, sleep disturbance, future risk of cataracts, personality changes, hyperglycemia reviewed with patient.\par Recommended doing bone scan. \par Recommended him to do surgical F/U.\par Discussed with him the treatment options if with Entyvio infusion is not effective with his condition he has to think of taking  Xeljanz or  Surgery \par He and his mother stated understood agree with Surgical F/U our office will make appointment for him. \par F/U OV in 3 - 4 week earlier if needed.

## 2019-02-26 NOTE — HISTORY OF PRESENT ILLNESS
[de-identified] : 38 year old male with Ulcerative colitis was admitted in hospital with Exacerbation. He had colonoscopy while hospitalized resulted active colitis and negative CMV Immunostains. He was on IV steroids His symptoms improved. On 2/17/19 he was  discharged home on Prednisone 40 mg daily. Since discharge he feels better but still has urgency, frequency , blood in stool, 5- 9 bowel movement daily. His appetite is better. He has more energy. \par He was advised to stop  Mesalamine due slight increase in creatinine . \par Injection Humira was discontinued due to poor symptoms control, \par He was started on entyvio from 1/18/19. He is due for his 3rd dose Entyvio loading dose today.  \par \par Social history nonsmoker

## 2019-02-26 NOTE — PHYSICAL EXAM
[General Appearance - Alert] : alert [General Appearance - In No Acute Distress] : in no acute distress [General Appearance - Well Nourished] : well nourished [General Appearance - Well Developed] : well developed [Sclera] : the sclera and conjunctiva were normal [Outer Ear] : the ears and nose were normal in appearance [Neck Appearance] : the appearance of the neck was normal [Respiration, Rhythm And Depth] : normal respiratory rhythm and effort [Exaggerated Use Of Accessory Muscles For Inspiration] : no accessory muscle use [Auscultation Breath Sounds / Voice Sounds] : lungs were clear to auscultation bilaterally [Apical Impulse] : the apical impulse was normal [Heart Rate And Rhythm] : heart rate was normal and rhythm regular [Heart Sounds] : normal S1 and S2 [Heart Sounds Gallop] : no gallops [Edema] : there was no peripheral edema [Bowel Sounds] : normal bowel sounds [Abdomen Soft] : soft [Abdomen Tenderness] : non-tender [Abnormal Walk] : normal gait [Skin Color & Pigmentation] : normal skin color and pigmentation [Skin Turgor] : normal skin turgor [] : no rash [Skin Lesions] : no skin lesions [Oriented To Time, Place, And Person] : oriented to person, place, and time [Impaired Insight] : insight and judgment were intact [Affect] : the affect was normal [Mood] : the mood was normal

## 2019-03-14 ENCOUNTER — CHART COPY (OUTPATIENT)
Age: 39
End: 2019-03-14

## 2019-03-26 ENCOUNTER — APPOINTMENT (OUTPATIENT)
Dept: COLORECTAL SURGERY | Facility: CLINIC | Age: 39
End: 2019-03-26
Payer: MEDICAID

## 2019-03-26 ENCOUNTER — APPOINTMENT (OUTPATIENT)
Dept: GASTROENTEROLOGY | Facility: CLINIC | Age: 39
End: 2019-03-26
Payer: MEDICAID

## 2019-03-26 ENCOUNTER — LABORATORY RESULT (OUTPATIENT)
Age: 39
End: 2019-03-26

## 2019-03-26 ENCOUNTER — APPOINTMENT (OUTPATIENT)
Dept: DERMATOLOGY | Facility: CLINIC | Age: 39
End: 2019-03-26
Payer: MEDICAID

## 2019-03-26 VITALS
DIASTOLIC BLOOD PRESSURE: 89 MMHG | WEIGHT: 147 LBS | RESPIRATION RATE: 14 BRPM | BODY MASS INDEX: 24.49 KG/M2 | HEART RATE: 85 BPM | HEIGHT: 65 IN | SYSTOLIC BLOOD PRESSURE: 133 MMHG | OXYGEN SATURATION: 99 %

## 2019-03-26 VITALS
BODY MASS INDEX: 24.49 KG/M2 | HEIGHT: 65 IN | DIASTOLIC BLOOD PRESSURE: 78 MMHG | WEIGHT: 147 LBS | SYSTOLIC BLOOD PRESSURE: 120 MMHG

## 2019-03-26 VITALS
OXYGEN SATURATION: 99 % | WEIGHT: 157 LBS | HEART RATE: 93 BPM | BODY MASS INDEX: 26.16 KG/M2 | RESPIRATION RATE: 15 BRPM | TEMPERATURE: 98 F | SYSTOLIC BLOOD PRESSURE: 110 MMHG | HEIGHT: 65 IN | DIASTOLIC BLOOD PRESSURE: 74 MMHG

## 2019-03-26 DIAGNOSIS — B36.0 PITYRIASIS VERSICOLOR: ICD-10-CM

## 2019-03-26 DIAGNOSIS — L73.0 ACNE KELOID: ICD-10-CM

## 2019-03-26 DIAGNOSIS — K13.0 DISEASES OF LIPS: ICD-10-CM

## 2019-03-26 DIAGNOSIS — L28.0 LICHEN SIMPLEX CHRONICUS: ICD-10-CM

## 2019-03-26 DIAGNOSIS — L65.9 NONSCARRING HAIR LOSS, UNSPECIFIED: ICD-10-CM

## 2019-03-26 PROCEDURE — 99213 OFFICE O/P EST LOW 20 MIN: CPT

## 2019-03-26 PROCEDURE — 99215 OFFICE O/P EST HI 40 MIN: CPT

## 2019-03-26 PROCEDURE — 99214 OFFICE O/P EST MOD 30 MIN: CPT

## 2019-03-26 RX ORDER — MESALAMINE 375 MG/1
0.38 CAPSULE, EXTENDED RELEASE ORAL
Qty: 360 | Refills: 3 | Status: DISCONTINUED | COMMUNITY
Start: 2017-06-01 | End: 2019-03-26

## 2019-03-26 RX ORDER — FERROUS SULFATE 325(65) MG
325 TABLET ORAL
Refills: 0 | Status: ACTIVE | COMMUNITY

## 2019-03-26 RX ORDER — CLINDAMYCIN PHOSPHATE 10 MG/ML
1 LOTION TOPICAL DAILY
Qty: 1 | Refills: 3 | Status: ACTIVE | COMMUNITY
Start: 2019-03-26 | End: 1900-01-01

## 2019-03-26 RX ORDER — MESALAMINE 1.2 G/1
1.2 TABLET, DELAYED RELEASE ORAL DAILY
Qty: 360 | Refills: 1 | Status: DISCONTINUED | COMMUNITY
Start: 2017-05-10 | End: 2019-03-26

## 2019-03-26 RX ORDER — MESALAMINE 1000 MG/1
1000 SUPPOSITORY RECTAL
Qty: 1 | Refills: 5 | Status: DISCONTINUED | COMMUNITY
Start: 2017-04-25 | End: 2019-03-26

## 2019-03-26 NOTE — HISTORY OF PRESENT ILLNESS
[de-identified] : 38-year-old male hospital followup, ulcerative colitis\par Patient has completed entyvio induction, awaiting his next maintenance treatment (first eight-week treatment) 4 weeks from now\par Patient currently on prednisone 10 mg daily\par Has had notable improvement of his bowel habit down to 4-6 bowel movements a day, much less blood, more formation\par Still complaining of some gas\par \par Bone density reportedly showing osteopenia, report unavailable at this time

## 2019-03-26 NOTE — CONSULT LETTER
[Dear  ___] : Dear  [unfilled], [Consult Letter:] : I had the pleasure of evaluating your patient, [unfilled]. [( Thank you for referring [unfilled] for consultation for _____ )] : Thank you for referring [unfilled] for consultation for [unfilled] [Please see my note below.] : Please see my note below. [Consult Closing:] : Thank you very much for allowing me to participate in the care of this patient.  If you have any questions, please do not hesitate to contact me. [Sincerely,] : Sincerely, [FreeTextEntry2] : Rohith Franz [FreeTextEntry3] : Rishabh Delgado MD FACS\par Chief Colon and Rectal Surgery\par [facility]

## 2019-03-26 NOTE — ASSESSMENT
[FreeTextEntry1] : Worsening through colitis\par -We discussed the surgical management of this report is at length. This included 2 and 3 stage total abdominal/proctocolectomy with J. pouch creation. We discussed risks and benefits of this procedure at length including bleeding infection, anastomotic leak, pouch failure, multiple bowel movements, erectile dysfunction, and Crohn's conversion. We also discussed the benefits of proctocolectomy\par -All questions were answered at length\par -Patient will continue Entyvio to monitor response and induction of remission. He is aware of the prolonged steroid use will likely necessitate surgical intervention\par -Patient to followup as needed

## 2019-03-26 NOTE — PHYSICAL EXAM
[General Appearance - Alert] : alert [General Appearance - In No Acute Distress] : in no acute distress [Sclera] : the sclera and conjunctiva were normal [PERRL With Normal Accommodation] : pupils were equal in size, round, and reactive to light [Extraocular Movements] : extraocular movements were intact [Outer Ear] : the ears and nose were normal in appearance [Oropharynx] : the oropharynx was normal [Neck Appearance] : the appearance of the neck was normal [Neck Cervical Mass (___cm)] : no neck mass was observed [Jugular Venous Distention Increased] : there was no jugular-venous distention [Thyroid Diffuse Enlargement] : the thyroid was not enlarged [Thyroid Nodule] : there were no palpable thyroid nodules [] : no respiratory distress [Auscultation Breath Sounds / Voice Sounds] : lungs were clear to auscultation bilaterally [Heart Rate And Rhythm] : heart rate was normal and rhythm regular [Heart Sounds] : normal S1 and S2 [Heart Sounds Gallop] : no gallops [Murmurs] : no murmurs [Heart Sounds Pericardial Friction Rub] : no pericardial rub [Edema] : there was no peripheral edema [Oriented To Time, Place, And Person] : oriented to person, place, and time [Impaired Insight] : insight and judgment were intact [Affect] : the affect was normal

## 2019-03-26 NOTE — HISTORY OF PRESENT ILLNESS
[FreeTextEntry1] : 37yo male referred by Dr. Franz, with hx of UC presents for surgical consultation.  Pt was first diagnosed with UC in 2014.  Pt has tried Canasa, Lialda, Humira which all failed.  Pt currently on Entivyo (3 doses) and Prednisone 10mg.  Pt was hospitalized with a UC flare up Feb 7-16.2019.  Pt underwent colonoscopy in the hospital which revealed severe pan UC.  Pt currently having 4-6 BM daily, intermittent episodes of BRBPR, occasionally loose stools, c/o gas pains.

## 2019-03-26 NOTE — PHYSICAL EXAM
[Normal Breath Sounds] : Normal breath sounds [Normal Heart Sounds] : normal heart sounds [Normal Rate and Rhythm] : normal rate and rhythm [Alert] : alert [Oriented to Person] : oriented to person [Oriented to Place] : oriented to place [Oriented to Time] : oriented to time [Calm] : calm [de-identified] : soft, NT/ND/ +BS [de-identified] : well nourished male [de-identified] : NC/AT [de-identified] : +ROM/MARIPOSA [de-identified] : intact

## 2019-03-26 NOTE — ASSESSMENT
[FreeTextEntry1] : 38-year-old male ulcerative colitis prior failure of Humira, failure of mesalamine, with some initial signs of improvement with entyvio\par Osteopenia likely related to frequent steroid use, ongoing disease activity\par \par Plan\par Continue infusions as ordered\par Continue calcium supplement,\par recommend 1500 mg a day\par Check vitamin D level today\par Routine followup blood work today\par Patient  has consultation with colorectal today to discuss candidacy for colectomy and J-pouch if medical therapy ineffective\par Office visit 6 weeks

## 2019-03-26 NOTE — REVIEW OF SYSTEMS
[Feeling Tired] : feeling tired [As Noted in HPI] : as noted in HPI [Negative] : Respiratory [Fever] : no fever [Chills] : no chills

## 2019-03-27 ENCOUNTER — MEDICATION RENEWAL (OUTPATIENT)
Age: 39
End: 2019-03-27

## 2019-03-27 ENCOUNTER — CLINICAL ADVICE (OUTPATIENT)
Age: 39
End: 2019-03-27

## 2019-03-27 LAB
25(OH)D3 SERPL-MCNC: 25.3 NG/ML
ALBUMIN SERPL ELPH-MCNC: 4.3 G/DL
ALP BLD-CCNC: 65 U/L
ALT SERPL-CCNC: 17 U/L
ANION GAP SERPL CALC-SCNC: 12 MMOL/L
AST SERPL-CCNC: 16 U/L
BASOPHILS # BLD AUTO: 0.05 K/UL
BASOPHILS NFR BLD AUTO: 0.3 %
BILIRUB SERPL-MCNC: <0.2 MG/DL
BUN SERPL-MCNC: 19 MG/DL
CALCIUM SERPL-MCNC: 9.1 MG/DL
CHLORIDE SERPL-SCNC: 103 MMOL/L
CO2 SERPL-SCNC: 23 MMOL/L
CREAT SERPL-MCNC: 1.08 MG/DL
CRP SERPL-MCNC: 0.59 MG/DL
EOSINOPHIL # BLD AUTO: 0.02 K/UL
EOSINOPHIL NFR BLD AUTO: 0.1 %
GLUCOSE SERPL-MCNC: 105 MG/DL
HCT VFR BLD CALC: 35.4 %
HGB BLD-MCNC: 10 G/DL
IMM GRANULOCYTES NFR BLD AUTO: 0.4 %
IRON SATN MFR SERPL: 5 %
IRON SERPL-MCNC: 16 UG/DL
LYMPHOCYTES # BLD AUTO: 4.27 K/UL
LYMPHOCYTES NFR BLD AUTO: 29.5 %
MAN DIFF?: NORMAL
MCHC RBC-ENTMCNC: 20.8 PG
MCHC RBC-ENTMCNC: 28.2 GM/DL
MCV RBC AUTO: 73.8 FL
MONOCYTES # BLD AUTO: 1.16 K/UL
MONOCYTES NFR BLD AUTO: 8 %
NEUTROPHILS # BLD AUTO: 8.93 K/UL
NEUTROPHILS NFR BLD AUTO: 61.7 %
PLATELET # BLD AUTO: 388 K/UL
POTASSIUM SERPL-SCNC: 4.3 MMOL/L
PROT SERPL-MCNC: 7.3 G/DL
RBC # BLD: 4.8 M/UL
RBC # FLD: 25.1 %
SODIUM SERPL-SCNC: 138 MMOL/L
TIBC SERPL-MCNC: 341 UG/DL
UIBC SERPL-MCNC: 325 UG/DL
WBC # FLD AUTO: 14.49 K/UL

## 2019-03-27 RX ORDER — PREDNISONE 10 MG/1
10 TABLET ORAL DAILY
Qty: 30 | Refills: 0 | Status: ACTIVE | COMMUNITY
Start: 2019-03-27 | End: 1900-01-01

## 2019-04-01 ENCOUNTER — MEDICATION RENEWAL (OUTPATIENT)
Age: 39
End: 2019-04-01

## 2019-04-23 ENCOUNTER — APPOINTMENT (OUTPATIENT)
Dept: RHEUMATOLOGY | Facility: CLINIC | Age: 39
End: 2019-04-23
Payer: MEDICAID

## 2019-04-23 ENCOUNTER — CLINICAL ADVICE (OUTPATIENT)
Age: 39
End: 2019-04-23

## 2019-04-23 PROCEDURE — 96413 CHEMO IV INFUSION 1 HR: CPT

## 2019-04-25 RX ORDER — PREDNISONE 10 MG/1
10 TABLET ORAL DAILY
Qty: 80 | Refills: 0 | Status: DISCONTINUED | COMMUNITY
Start: 2017-09-22 | End: 2019-04-25

## 2019-04-25 RX ORDER — PREDNISONE 10 MG/1
10 TABLET ORAL
Qty: 90 | Refills: 0 | Status: DISCONTINUED | COMMUNITY
Start: 2017-10-31 | End: 2019-04-25

## 2019-04-25 RX ORDER — PREDNISONE 5 MG/1
5 TABLET ORAL
Qty: 1 | Refills: 0 | Status: ACTIVE | COMMUNITY
Start: 2019-04-25 | End: 1900-01-01

## 2019-05-14 ENCOUNTER — APPOINTMENT (OUTPATIENT)
Dept: GASTROENTEROLOGY | Facility: CLINIC | Age: 39
End: 2019-05-14
Payer: MEDICAID

## 2019-05-14 VITALS
HEART RATE: 98 BPM | SYSTOLIC BLOOD PRESSURE: 120 MMHG | DIASTOLIC BLOOD PRESSURE: 84 MMHG | TEMPERATURE: 97.7 F | WEIGHT: 167 LBS | RESPIRATION RATE: 15 BRPM | HEIGHT: 65 IN | OXYGEN SATURATION: 98 % | BODY MASS INDEX: 27.82 KG/M2

## 2019-05-14 DIAGNOSIS — K51.50 LEFT SIDED COLITIS W/OUT COMPLICATIONS: ICD-10-CM

## 2019-05-14 PROCEDURE — 99214 OFFICE O/P EST MOD 30 MIN: CPT

## 2019-05-14 NOTE — ASSESSMENT
[FreeTextEntry1] : Active ulcerative colitis though clinically improving with entyvio \par \par Plan\par Continue infusions\par Recheck blood work\par Office visit again in 2 months

## 2019-05-14 NOTE — REVIEW OF SYSTEMS
[Feeling Poorly] : feeling poorly [Feeling Tired] : feeling tired [Recent Weight Gain (___ Lbs)] : recent [unfilled] ~Ulb weight gain [As Noted in HPI] : as noted in HPI [Negative] : Respiratory [Chills] : no chills [Fever] : no fever

## 2019-05-14 NOTE — HISTORY OF PRESENT ILLNESS
[de-identified] : 38-year-old male, ulcerative colitis routine followup\par Patient has successfully tapered off prednisone as he continues entyvio\par Significant improvement of bowel habit, now ranging between 2 and 6 stools daily\par Stools however are still loose, occasional blood noted.\par Weight gain noted\par \par Patient complaining of altered sleep habits and poor appetite\par \par \par Prior failure of Humira

## 2019-05-14 NOTE — PHYSICAL EXAM
[General Appearance - Alert] : alert [Sclera] : the sclera and conjunctiva were normal [General Appearance - In No Acute Distress] : in no acute distress [PERRL With Normal Accommodation] : pupils were equal in size, round, and reactive to light [Extraocular Movements] : extraocular movements were intact [Oropharynx] : the oropharynx was normal [Outer Ear] : the ears and nose were normal in appearance [Jugular Venous Distention Increased] : there was no jugular-venous distention [Neck Appearance] : the appearance of the neck was normal [Neck Cervical Mass (___cm)] : no neck mass was observed [Thyroid Nodule] : there were no palpable thyroid nodules [Thyroid Diffuse Enlargement] : the thyroid was not enlarged [Heart Rate And Rhythm] : heart rate was normal and rhythm regular [Heart Sounds] : normal S1 and S2 [Auscultation Breath Sounds / Voice Sounds] : lungs were clear to auscultation bilaterally [Murmurs] : no murmurs [Heart Sounds Gallop] : no gallops [Heart Sounds Pericardial Friction Rub] : no pericardial rub [Bowel Sounds] : normal bowel sounds [Abdomen Tenderness] : non-tender [Abdomen Soft] : soft [Abdomen Mass (___ Cm)] : no abdominal mass palpated [] : no hepato-splenomegaly [Oriented To Time, Place, And Person] : oriented to person, place, and time [Impaired Insight] : insight and judgment were intact [Affect] : the affect was normal [FreeTextEntry1] : Diminished moon facies

## 2019-05-15 ENCOUNTER — MESSAGE (OUTPATIENT)
Age: 39
End: 2019-05-15

## 2019-05-15 LAB
25(OH)D3 SERPL-MCNC: 35.2 NG/ML
ALBUMIN SERPL ELPH-MCNC: 4 G/DL
ALP BLD-CCNC: 77 U/L
ALT SERPL-CCNC: 13 U/L
ANION GAP SERPL CALC-SCNC: 12 MMOL/L
AST SERPL-CCNC: 15 U/L
BASOPHILS # BLD AUTO: 0.04 K/UL
BASOPHILS NFR BLD AUTO: 0.4 %
BILIRUB SERPL-MCNC: 0.2 MG/DL
BUN SERPL-MCNC: 14 MG/DL
CALCIUM SERPL-MCNC: 9 MG/DL
CHLORIDE SERPL-SCNC: 105 MMOL/L
CO2 SERPL-SCNC: 24 MMOL/L
CREAT SERPL-MCNC: 1.38 MG/DL
CRP SERPL-MCNC: 0.57 MG/DL
EOSINOPHIL # BLD AUTO: 0.11 K/UL
EOSINOPHIL NFR BLD AUTO: 1 %
GLUCOSE SERPL-MCNC: 102 MG/DL
HCT VFR BLD CALC: 40.1 %
HGB BLD-MCNC: 11.4 G/DL
IMM GRANULOCYTES NFR BLD AUTO: 0.2 %
IRON SATN MFR SERPL: 11 %
IRON SERPL-MCNC: 36 UG/DL
LYMPHOCYTES # BLD AUTO: 3.23 K/UL
LYMPHOCYTES NFR BLD AUTO: 30.5 %
MAN DIFF?: NORMAL
MCHC RBC-ENTMCNC: 21.3 PG
MCHC RBC-ENTMCNC: 28.4 GM/DL
MCV RBC AUTO: 75.1 FL
MONOCYTES # BLD AUTO: 0.95 K/UL
MONOCYTES NFR BLD AUTO: 9 %
NEUTROPHILS # BLD AUTO: 6.24 K/UL
NEUTROPHILS NFR BLD AUTO: 58.9 %
PLATELET # BLD AUTO: 345 K/UL
POTASSIUM SERPL-SCNC: 4 MMOL/L
PROT SERPL-MCNC: 7.2 G/DL
RBC # BLD: 5.34 M/UL
RBC # FLD: 19.3 %
SODIUM SERPL-SCNC: 141 MMOL/L
TIBC SERPL-MCNC: 332 UG/DL
UIBC SERPL-MCNC: 296 UG/DL
WBC # FLD AUTO: 10.59 K/UL

## 2019-06-18 ENCOUNTER — APPOINTMENT (OUTPATIENT)
Dept: RHEUMATOLOGY | Facility: CLINIC | Age: 39
End: 2019-06-18
Payer: MEDICAID

## 2019-06-18 PROCEDURE — 96413 CHEMO IV INFUSION 1 HR: CPT

## 2019-06-20 ENCOUNTER — MEDICATION RENEWAL (OUTPATIENT)
Age: 39
End: 2019-06-20

## 2019-08-13 ENCOUNTER — APPOINTMENT (OUTPATIENT)
Dept: RHEUMATOLOGY | Facility: CLINIC | Age: 39
End: 2019-08-13
Payer: MEDICAID

## 2019-08-13 PROCEDURE — 96413 CHEMO IV INFUSION 1 HR: CPT

## 2019-08-14 LAB
ALBUMIN SERPL ELPH-MCNC: 4.3 G/DL
ALP BLD-CCNC: 80 U/L
ALT SERPL-CCNC: 15 U/L
ANION GAP SERPL CALC-SCNC: 14 MMOL/L
AST SERPL-CCNC: 18 U/L
BASOPHILS # BLD AUTO: 0.05 K/UL
BASOPHILS NFR BLD AUTO: 0.5 %
BILIRUB SERPL-MCNC: 0.3 MG/DL
BUN SERPL-MCNC: 14 MG/DL
CALCIUM SERPL-MCNC: 9.1 MG/DL
CHLORIDE SERPL-SCNC: 102 MMOL/L
CO2 SERPL-SCNC: 24 MMOL/L
CREAT SERPL-MCNC: 1.36 MG/DL
EOSINOPHIL # BLD AUTO: 0.2 K/UL
EOSINOPHIL NFR BLD AUTO: 1.9 %
GLUCOSE SERPL-MCNC: 71 MG/DL
HCT VFR BLD CALC: 37.5 %
HGB BLD-MCNC: 10.9 G/DL
IMM GRANULOCYTES NFR BLD AUTO: 0.2 %
LYMPHOCYTES # BLD AUTO: 4.03 K/UL
LYMPHOCYTES NFR BLD AUTO: 39 %
MAN DIFF?: NORMAL
MCHC RBC-ENTMCNC: 21.8 PG
MCHC RBC-ENTMCNC: 29.1 GM/DL
MCV RBC AUTO: 75 FL
MONOCYTES # BLD AUTO: 0.98 K/UL
MONOCYTES NFR BLD AUTO: 9.5 %
NEUTROPHILS # BLD AUTO: 5.06 K/UL
NEUTROPHILS NFR BLD AUTO: 48.9 %
PLATELET # BLD AUTO: 343 K/UL
POTASSIUM SERPL-SCNC: 4.3 MMOL/L
PROT SERPL-MCNC: 7.9 G/DL
RBC # BLD: 5 M/UL
RBC # FLD: 21.5 %
SODIUM SERPL-SCNC: 140 MMOL/L
WBC # FLD AUTO: 10.34 K/UL

## 2019-08-26 LAB
ANTIBODIES TO VEDOLIZUMAB (ATV) CONCENTRATION: < 1.6 U/ML
PROMETHEUS ANSER VDZ: NORMAL
PROMETHEUS LABORATORY FOOTER: NORMAL
SERUM VEDOLIZUMAB (VDZ) CONCENTRATION: 10 UG/ML

## 2019-09-30 ENCOUNTER — MEDICATION RENEWAL (OUTPATIENT)
Age: 39
End: 2019-09-30

## 2019-10-01 ENCOUNTER — MEDICATION RENEWAL (OUTPATIENT)
Age: 39
End: 2019-10-01

## 2019-10-08 ENCOUNTER — APPOINTMENT (OUTPATIENT)
Dept: RHEUMATOLOGY | Facility: CLINIC | Age: 39
End: 2019-10-08
Payer: MEDICAID

## 2019-10-08 ENCOUNTER — APPOINTMENT (OUTPATIENT)
Dept: GASTROENTEROLOGY | Facility: CLINIC | Age: 39
End: 2019-10-08
Payer: MEDICAID

## 2019-10-08 VITALS
HEIGHT: 65 IN | BODY MASS INDEX: 28.17 KG/M2 | HEART RATE: 82 BPM | SYSTOLIC BLOOD PRESSURE: 130 MMHG | OXYGEN SATURATION: 98 % | WEIGHT: 169.06 LBS | DIASTOLIC BLOOD PRESSURE: 80 MMHG

## 2019-10-08 PROCEDURE — 99214 OFFICE O/P EST MOD 30 MIN: CPT

## 2019-10-08 PROCEDURE — 96413 CHEMO IV INFUSION 1 HR: CPT

## 2019-10-08 NOTE — HISTORY OF PRESENT ILLNESS
[de-identified] : 38-year-old male history of ulcerative colitis, prior corticosteroid failure, mesalamine failure, Humira failure, doing well for most of the past year on Entyvio every 8 weeks.\par Patient does feel some diminishing effect of the medication before his eight-week infusions, some increased frequency of bowel movements joint pains. Generally doing well, 2-3 bowel movements a day, rare blood. Last blood work in August however still shows some mild anemia.

## 2019-10-08 NOTE — ASSESSMENT
[FreeTextEntry1] : Improving ulcerative colitis on entyvio\par Patient reporting diminishing effects in the one to 2 weeks before his every 8 week infusion\par Stool signs of persistent disease activity, anemia,\par Patient would appear to be excellent candidate for slight dose escalation to every 6 weeks treatment\par \par Plan\par Routine blood testing today\par Patient has infusion today\par Will consider dose escalation\par Telephone followup later this week\par Office visit again in 3 months

## 2019-10-08 NOTE — PHYSICAL EXAM
[General Appearance - Alert] : alert [General Appearance - In No Acute Distress] : in no acute distress [PERRL With Normal Accommodation] : pupils were equal in size, round, and reactive to light [Sclera] : the sclera and conjunctiva were normal [Extraocular Movements] : extraocular movements were intact [Outer Ear] : the ears and nose were normal in appearance [Oropharynx] : the oropharynx was normal [Neck Appearance] : the appearance of the neck was normal [Neck Cervical Mass (___cm)] : no neck mass was observed [Jugular Venous Distention Increased] : there was no jugular-venous distention [Thyroid Diffuse Enlargement] : the thyroid was not enlarged [Thyroid Nodule] : there were no palpable thyroid nodules [Heart Rate And Rhythm] : heart rate was normal and rhythm regular [Auscultation Breath Sounds / Voice Sounds] : lungs were clear to auscultation bilaterally [Heart Sounds Gallop] : no gallops [Heart Sounds] : normal S1 and S2 [Murmurs] : no murmurs [Heart Sounds Pericardial Friction Rub] : no pericardial rub [Edema] : there was no peripheral edema [Abdomen Soft] : soft [Bowel Sounds] : normal bowel sounds [] : no hepato-splenomegaly [Abdomen Tenderness] : non-tender [Abdomen Mass (___ Cm)] : no abdominal mass palpated [Oriented To Time, Place, And Person] : oriented to person, place, and time [Impaired Insight] : insight and judgment were intact [Affect] : the affect was normal

## 2019-10-10 ENCOUNTER — MEDICATION RENEWAL (OUTPATIENT)
Age: 39
End: 2019-10-10

## 2019-10-10 ENCOUNTER — CHART COPY (OUTPATIENT)
Age: 39
End: 2019-10-10

## 2019-10-10 LAB
ALBUMIN SERPL ELPH-MCNC: 4.3 G/DL
ALP BLD-CCNC: 67 U/L
ALT SERPL-CCNC: 14 U/L
ANION GAP SERPL CALC-SCNC: 10 MMOL/L
AST SERPL-CCNC: 15 U/L
BASOPHILS # BLD AUTO: 0.04 K/UL
BASOPHILS NFR BLD AUTO: 0.4 %
BILIRUB SERPL-MCNC: 0.3 MG/DL
BUN SERPL-MCNC: 13 MG/DL
CALCIUM SERPL-MCNC: 8.9 MG/DL
CHLORIDE SERPL-SCNC: 104 MMOL/L
CO2 SERPL-SCNC: 25 MMOL/L
CREAT SERPL-MCNC: 1.33 MG/DL
CRP SERPL-MCNC: 0.13 MG/DL
EOSINOPHIL # BLD AUTO: 0.17 K/UL
EOSINOPHIL NFR BLD AUTO: 1.9 %
GLUCOSE SERPL-MCNC: 95 MG/DL
HCT VFR BLD CALC: 36.7 %
HGB BLD-MCNC: 11.1 G/DL
IMM GRANULOCYTES NFR BLD AUTO: 0.1 %
IRON SATN MFR SERPL: 9 %
IRON SERPL-MCNC: 30 UG/DL
LYMPHOCYTES # BLD AUTO: 4.32 K/UL
LYMPHOCYTES NFR BLD AUTO: 48.5 %
MAN DIFF?: NORMAL
MCHC RBC-ENTMCNC: 22.5 PG
MCHC RBC-ENTMCNC: 30.2 GM/DL
MCV RBC AUTO: 74.4 FL
MONOCYTES # BLD AUTO: 0.93 K/UL
MONOCYTES NFR BLD AUTO: 10.4 %
NEUTROPHILS # BLD AUTO: 3.43 K/UL
NEUTROPHILS NFR BLD AUTO: 38.7 %
PLATELET # BLD AUTO: 312 K/UL
POTASSIUM SERPL-SCNC: 3.9 MMOL/L
PROT SERPL-MCNC: 7.5 G/DL
RBC # BLD: 4.93 M/UL
RBC # FLD: 17.2 %
SODIUM SERPL-SCNC: 139 MMOL/L
TIBC SERPL-MCNC: 332 UG/DL
UIBC SERPL-MCNC: 302 UG/DL
WBC # FLD AUTO: 8.9 K/UL

## 2019-10-10 RX ORDER — CHLORHEXIDINE GLUCONATE 4 %
325 (65 FE) LIQUID (ML) TOPICAL DAILY
Qty: 30 | Refills: 6 | Status: ACTIVE | COMMUNITY
Start: 2019-10-10 | End: 1900-01-01

## 2019-12-03 ENCOUNTER — APPOINTMENT (OUTPATIENT)
Dept: RHEUMATOLOGY | Facility: CLINIC | Age: 39
End: 2019-12-03

## 2020-01-28 ENCOUNTER — APPOINTMENT (OUTPATIENT)
Dept: GASTROENTEROLOGY | Facility: CLINIC | Age: 40
End: 2020-01-28
Payer: MEDICAID

## 2020-01-28 ENCOUNTER — LABORATORY RESULT (OUTPATIENT)
Age: 40
End: 2020-01-28

## 2020-01-28 ENCOUNTER — APPOINTMENT (OUTPATIENT)
Dept: RHEUMATOLOGY | Facility: CLINIC | Age: 40
End: 2020-01-28
Payer: MEDICAID

## 2020-01-28 VITALS
WEIGHT: 166 LBS | SYSTOLIC BLOOD PRESSURE: 130 MMHG | BODY MASS INDEX: 27.66 KG/M2 | HEIGHT: 65 IN | OXYGEN SATURATION: 98 % | HEART RATE: 90 BPM | DIASTOLIC BLOOD PRESSURE: 90 MMHG

## 2020-01-28 PROCEDURE — 99214 OFFICE O/P EST MOD 30 MIN: CPT

## 2020-01-28 PROCEDURE — 36415 COLL VENOUS BLD VENIPUNCTURE: CPT

## 2020-01-28 PROCEDURE — 96413 CHEMO IV INFUSION 1 HR: CPT

## 2020-01-28 RX ORDER — SODIUM SULFATE, POTASSIUM SULFATE, MAGNESIUM SULFATE 17.5; 3.13; 1.6 G/ML; G/ML; G/ML
17.5-3.13-1.6 SOLUTION, CONCENTRATE ORAL
Qty: 1 | Refills: 0 | Status: ACTIVE | COMMUNITY
Start: 2020-01-28 | End: 1900-01-01

## 2020-01-28 RX ORDER — CETIRIZINE HYDROCHLORIDE 10 MG/1
10 TABLET, FILM COATED ORAL
Qty: 1 | Refills: 2 | Status: DISCONTINUED | OUTPATIENT
Start: 2017-01-05 | End: 2020-01-28

## 2020-01-28 RX ORDER — ACETAMINOPHEN 325 MG/1
325 TABLET ORAL
Qty: 2 | Refills: 2 | Status: DISCONTINUED | OUTPATIENT
Start: 2017-01-05 | End: 2020-01-28

## 2020-01-28 NOTE — PHYSICAL EXAM
[General Appearance - In No Acute Distress] : in no acute distress [General Appearance - Alert] : alert [PERRL With Normal Accommodation] : pupils were equal in size, round, and reactive to light [Sclera] : the sclera and conjunctiva were normal [Extraocular Movements] : extraocular movements were intact [Impaired Insight] : insight and judgment were intact [Oriented To Time, Place, And Person] : oriented to person, place, and time [Affect] : the affect was normal

## 2020-01-28 NOTE — ASSESSMENT
[FreeTextEntry1] : Improved ulcerative colitis\par Anemia\par \par Plan\par Recheck blood work today prior to entyvio infusion\par Recheck tuberculosis status today , blood work prior to infusion\par Continue infusions as ordered\par \par Indications risks benefits and alternatives to colonoscopy for dysplasia surveillance purposes reviewed\par \par Patient is agreeable to examination

## 2020-01-28 NOTE — HISTORY OF PRESENT ILLNESS
[de-identified] : 39-year-old male, long-standing ulcerative colitis, routine followup\par Prior steroid dependence\par Prior failure of mesalamine\par Prior failure of Humira\par \par Patient has been on entyvio for almost one year with significant improvement of his complaints\par 2-3 bowel movements a day\par Even less bleeding\par Improving energy level\par Still some gas and bloating\par \par Patient benefit persists despite missing his last infusion\par Infusion scheduled today, and every 8 weeks thereafter\par \par

## 2020-01-29 ENCOUNTER — OTHER (OUTPATIENT)
Age: 40
End: 2020-01-29

## 2020-01-29 ENCOUNTER — MESSAGE (OUTPATIENT)
Age: 40
End: 2020-01-29

## 2020-01-29 LAB
BASOPHILS # BLD AUTO: 0.04 K/UL
BASOPHILS NFR BLD AUTO: 0.4 %
EOSINOPHIL # BLD AUTO: 0.14 K/UL
EOSINOPHIL NFR BLD AUTO: 1.4 %
HCT VFR BLD CALC: 39.3 %
HGB BLD-MCNC: 12 G/DL
IMM GRANULOCYTES NFR BLD AUTO: 0.4 %
LYMPHOCYTES # BLD AUTO: 4.23 K/UL
LYMPHOCYTES NFR BLD AUTO: 42.3 %
MAN DIFF?: NORMAL
MCHC RBC-ENTMCNC: 23.4 PG
MCHC RBC-ENTMCNC: 30.5 GM/DL
MCV RBC AUTO: 76.6 FL
MONOCYTES # BLD AUTO: 1.09 K/UL
MONOCYTES NFR BLD AUTO: 10.9 %
NEUTROPHILS # BLD AUTO: 4.45 K/UL
NEUTROPHILS NFR BLD AUTO: 44.6 %
PLATELET # BLD AUTO: 401 K/UL
RBC # BLD: 5.13 M/UL
RBC # FLD: 21.2 %
WBC # FLD AUTO: 9.99 K/UL

## 2020-01-30 ENCOUNTER — OTHER (OUTPATIENT)
Age: 40
End: 2020-01-30

## 2020-01-31 LAB
M TB IFN-G BLD-IMP: NEGATIVE
QUANTIFERON TB PLUS MITOGEN MINUS NIL: 6.41 IU/ML
QUANTIFERON TB PLUS NIL: 0.11 IU/ML
QUANTIFERON TB PLUS TB1 MINUS NIL: -0.03 IU/ML
QUANTIFERON TB PLUS TB2 MINUS NIL: -0.03 IU/ML

## 2020-03-30 ENCOUNTER — RX RENEWAL (OUTPATIENT)
Age: 40
End: 2020-03-30

## 2020-03-31 ENCOUNTER — APPOINTMENT (OUTPATIENT)
Dept: GASTROENTEROLOGY | Facility: CLINIC | Age: 40
End: 2020-03-31

## 2020-03-31 ENCOUNTER — APPOINTMENT (OUTPATIENT)
Dept: RHEUMATOLOGY | Facility: CLINIC | Age: 40
End: 2020-03-31
Payer: MEDICAID

## 2020-03-31 PROCEDURE — 96413 CHEMO IV INFUSION 1 HR: CPT

## 2020-03-31 PROCEDURE — 36415 COLL VENOUS BLD VENIPUNCTURE: CPT

## 2020-04-01 LAB
ALBUMIN SERPL ELPH-MCNC: 4.4 G/DL
ALP BLD-CCNC: 90 U/L
ALT SERPL-CCNC: 21 U/L
ANION GAP SERPL CALC-SCNC: 13 MMOL/L
AST SERPL-CCNC: 42 U/L
BASOPHILS # BLD AUTO: 0.06 K/UL
BASOPHILS NFR BLD AUTO: 0.5 %
BILIRUB SERPL-MCNC: 0.2 MG/DL
BUN SERPL-MCNC: 19 MG/DL
CALCIUM SERPL-MCNC: 9.2 MG/DL
CHLORIDE SERPL-SCNC: 102 MMOL/L
CO2 SERPL-SCNC: 24 MMOL/L
CREAT SERPL-MCNC: 1.25 MG/DL
CRP SERPL-MCNC: 0.28 MG/DL
EOSINOPHIL # BLD AUTO: 0.23 K/UL
EOSINOPHIL NFR BLD AUTO: 2 %
GLUCOSE SERPL-MCNC: 99 MG/DL
HCT VFR BLD CALC: 41.5 %
HGB BLD-MCNC: 13.3 G/DL
IMM GRANULOCYTES NFR BLD AUTO: 0.3 %
LYMPHOCYTES # BLD AUTO: 4.95 K/UL
LYMPHOCYTES NFR BLD AUTO: 42.9 %
MAN DIFF?: NORMAL
MCHC RBC-ENTMCNC: 24.5 PG
MCHC RBC-ENTMCNC: 32 GM/DL
MCV RBC AUTO: 76.6 FL
MONOCYTES # BLD AUTO: 0.97 K/UL
MONOCYTES NFR BLD AUTO: 8.4 %
NEUTROPHILS # BLD AUTO: 5.3 K/UL
NEUTROPHILS NFR BLD AUTO: 45.9 %
PLATELET # BLD AUTO: 303 K/UL
POTASSIUM SERPL-SCNC: 5.1 MMOL/L
PROT SERPL-MCNC: 8.1 G/DL
RBC # BLD: 5.42 M/UL
RBC # FLD: 19.4 %
SODIUM SERPL-SCNC: 139 MMOL/L
WBC # FLD AUTO: 11.55 K/UL

## 2020-05-19 ENCOUNTER — APPOINTMENT (OUTPATIENT)
Dept: RHEUMATOLOGY | Facility: CLINIC | Age: 40
End: 2020-05-19

## 2020-05-19 ENCOUNTER — APPOINTMENT (OUTPATIENT)
Dept: GASTROENTEROLOGY | Facility: CLINIC | Age: 40
End: 2020-05-19

## 2020-05-26 ENCOUNTER — APPOINTMENT (OUTPATIENT)
Dept: RHEUMATOLOGY | Facility: CLINIC | Age: 40
End: 2020-05-26
Payer: MEDICAID

## 2020-05-26 PROCEDURE — 96413 CHEMO IV INFUSION 1 HR: CPT

## 2020-07-14 ENCOUNTER — APPOINTMENT (OUTPATIENT)
Dept: RHEUMATOLOGY | Facility: CLINIC | Age: 40
End: 2020-07-14

## 2020-07-21 ENCOUNTER — APPOINTMENT (OUTPATIENT)
Dept: RHEUMATOLOGY | Facility: CLINIC | Age: 40
End: 2020-07-21
Payer: MEDICAID

## 2020-07-21 PROCEDURE — 96413 CHEMO IV INFUSION 1 HR: CPT

## 2020-09-15 ENCOUNTER — APPOINTMENT (OUTPATIENT)
Dept: RHEUMATOLOGY | Facility: CLINIC | Age: 40
End: 2020-09-15
Payer: MEDICAID

## 2020-09-15 PROCEDURE — 96413 CHEMO IV INFUSION 1 HR: CPT

## 2020-10-28 ENCOUNTER — APPOINTMENT (OUTPATIENT)
Dept: GASTROENTEROLOGY | Facility: AMBULATORY MEDICAL SERVICES | Age: 40
End: 2020-10-28

## 2020-11-03 ENCOUNTER — NON-APPOINTMENT (OUTPATIENT)
Age: 40
End: 2020-11-03

## 2020-11-10 ENCOUNTER — APPOINTMENT (OUTPATIENT)
Dept: RHEUMATOLOGY | Facility: CLINIC | Age: 40
End: 2020-11-10
Payer: MEDICAID

## 2020-11-10 PROCEDURE — 99072 ADDL SUPL MATRL&STAF TM PHE: CPT

## 2020-11-10 PROCEDURE — 96413 CHEMO IV INFUSION 1 HR: CPT

## 2021-01-05 ENCOUNTER — NON-APPOINTMENT (OUTPATIENT)
Age: 41
End: 2021-01-05

## 2021-01-05 ENCOUNTER — APPOINTMENT (OUTPATIENT)
Dept: RHEUMATOLOGY | Facility: CLINIC | Age: 41
End: 2021-01-05
Payer: MEDICAID

## 2021-01-05 PROCEDURE — 96413 CHEMO IV INFUSION 1 HR: CPT

## 2021-01-05 PROCEDURE — 99072 ADDL SUPL MATRL&STAF TM PHE: CPT

## 2021-03-04 ENCOUNTER — NON-APPOINTMENT (OUTPATIENT)
Age: 41
End: 2021-03-04

## 2021-03-05 ENCOUNTER — NON-APPOINTMENT (OUTPATIENT)
Age: 41
End: 2021-03-05

## 2021-03-08 ENCOUNTER — NON-APPOINTMENT (OUTPATIENT)
Age: 41
End: 2021-03-08

## 2021-03-16 ENCOUNTER — APPOINTMENT (OUTPATIENT)
Dept: GASTROENTEROLOGY | Facility: CLINIC | Age: 41
End: 2021-03-16
Payer: MEDICAID

## 2021-03-16 PROCEDURE — 99443: CPT

## 2021-03-25 ENCOUNTER — NON-APPOINTMENT (OUTPATIENT)
Age: 41
End: 2021-03-25

## 2021-04-08 ENCOUNTER — NON-APPOINTMENT (OUTPATIENT)
Age: 41
End: 2021-04-08

## 2021-04-12 ENCOUNTER — NON-APPOINTMENT (OUTPATIENT)
Age: 41
End: 2021-04-12

## 2021-04-13 ENCOUNTER — APPOINTMENT (OUTPATIENT)
Dept: RHEUMATOLOGY | Facility: CLINIC | Age: 41
End: 2021-04-13

## 2021-04-22 RX ORDER — USTEKINUMAB 90 MG/ML
90 INJECTION, SOLUTION SUBCUTANEOUS
Qty: 1 | Refills: 5 | Status: ACTIVE | COMMUNITY
Start: 2021-04-01 | End: 1900-01-01

## 2021-04-29 ENCOUNTER — APPOINTMENT (OUTPATIENT)
Dept: GASTROENTEROLOGY | Facility: CLINIC | Age: 41
End: 2021-04-29

## 2021-05-04 ENCOUNTER — APPOINTMENT (OUTPATIENT)
Dept: RHEUMATOLOGY | Facility: CLINIC | Age: 41
End: 2021-05-04

## 2021-05-20 ENCOUNTER — NON-APPOINTMENT (OUTPATIENT)
Age: 41
End: 2021-05-20

## 2021-05-26 ENCOUNTER — NON-APPOINTMENT (OUTPATIENT)
Age: 41
End: 2021-05-26

## 2021-06-04 ENCOUNTER — NON-APPOINTMENT (OUTPATIENT)
Age: 41
End: 2021-06-04

## 2021-07-22 ENCOUNTER — NON-APPOINTMENT (OUTPATIENT)
Age: 41
End: 2021-07-22

## 2021-11-04 ENCOUNTER — NON-APPOINTMENT (OUTPATIENT)
Age: 41
End: 2021-11-04

## 2021-11-08 RX ORDER — ACETAMINOPHEN 325 MG/1
325 TABLET ORAL
Qty: 2 | Refills: 5 | Status: ACTIVE | OUTPATIENT
Start: 2018-10-30

## 2021-11-08 RX ORDER — CETIRIZINE HYDROCHLORIDE 10 MG/1
10 TABLET, FILM COATED ORAL
Qty: 1 | Refills: 5 | Status: ACTIVE | OUTPATIENT
Start: 2018-10-30

## 2021-11-20 ENCOUNTER — APPOINTMENT (OUTPATIENT)
Dept: RHEUMATOLOGY | Facility: CLINIC | Age: 41
End: 2021-11-20
Payer: MEDICAID

## 2021-11-20 PROCEDURE — 96413 CHEMO IV INFUSION 1 HR: CPT

## 2021-11-20 PROCEDURE — 36415 COLL VENOUS BLD VENIPUNCTURE: CPT

## 2021-11-21 LAB
25(OH)D3 SERPL-MCNC: 38.2 NG/ML
ALBUMIN SERPL ELPH-MCNC: 4.1 G/DL
ALP BLD-CCNC: 68 U/L
ALT SERPL-CCNC: 20 U/L
ANION GAP SERPL CALC-SCNC: 13 MMOL/L
AST SERPL-CCNC: 19 U/L
BASOPHILS # BLD AUTO: 0.04 K/UL
BASOPHILS NFR BLD AUTO: 0.5 %
BILIRUB SERPL-MCNC: 0.6 MG/DL
BUN SERPL-MCNC: 19 MG/DL
CALCIUM SERPL-MCNC: 8.7 MG/DL
CHLORIDE SERPL-SCNC: 107 MMOL/L
CO2 SERPL-SCNC: 22 MMOL/L
CREAT SERPL-MCNC: 1.2 MG/DL
CRP SERPL-MCNC: 3 MG/L
EOSINOPHIL # BLD AUTO: 0.14 K/UL
EOSINOPHIL NFR BLD AUTO: 1.6 %
GLUCOSE SERPL-MCNC: 88 MG/DL
HBV CORE IGG+IGM SER QL: NONREACTIVE
HBV SURFACE AB SER QL: NONREACTIVE
HBV SURFACE AG SER QL: NONREACTIVE
HCT VFR BLD CALC: 39.9 %
HGB BLD-MCNC: 12.3 G/DL
IMM GRANULOCYTES NFR BLD AUTO: 0.2 %
IRON SATN MFR SERPL: 47 %
IRON SERPL-MCNC: 104 UG/DL
LYMPHOCYTES # BLD AUTO: 3.26 K/UL
LYMPHOCYTES NFR BLD AUTO: 37 %
MAN DIFF?: NORMAL
MCHC RBC-ENTMCNC: 26.5 PG
MCHC RBC-ENTMCNC: 30.8 GM/DL
MCV RBC AUTO: 86 FL
MONOCYTES # BLD AUTO: 1.17 K/UL
MONOCYTES NFR BLD AUTO: 13.3 %
NEUTROPHILS # BLD AUTO: 4.19 K/UL
NEUTROPHILS NFR BLD AUTO: 47.4 %
PLATELET # BLD AUTO: 250 K/UL
POTASSIUM SERPL-SCNC: 4.1 MMOL/L
PROT SERPL-MCNC: 7.1 G/DL
RBC # BLD: 4.64 M/UL
RBC # FLD: 15.7 %
SODIUM SERPL-SCNC: 141 MMOL/L
TIBC SERPL-MCNC: 222 UG/DL
UIBC SERPL-MCNC: 118 UG/DL
VZV AB TITR SER: NEGATIVE
VZV IGG SER IF-ACNC: 10.9 INDEX
WBC # FLD AUTO: 8.82 K/UL

## 2021-11-23 LAB
M TB IFN-G BLD-IMP: NEGATIVE
QUANTIFERON TB PLUS MITOGEN MINUS NIL: 8.36 IU/ML
QUANTIFERON TB PLUS NIL: 0.24 IU/ML
QUANTIFERON TB PLUS TB1 MINUS NIL: -0.03 IU/ML
QUANTIFERON TB PLUS TB2 MINUS NIL: -0.04 IU/ML

## 2021-12-03 RX ORDER — VEDOLIZUMAB 300 MG/5ML
300 INJECTION, POWDER, LYOPHILIZED, FOR SOLUTION INTRAVENOUS
Qty: 1 | Refills: 4 | Status: ACTIVE | OUTPATIENT
Start: 2021-11-08

## 2021-12-07 ENCOUNTER — APPOINTMENT (OUTPATIENT)
Dept: RHEUMATOLOGY | Facility: CLINIC | Age: 41
End: 2021-12-07
Payer: MEDICAID

## 2021-12-07 PROCEDURE — 96413 CHEMO IV INFUSION 1 HR: CPT

## 2021-12-17 ENCOUNTER — APPOINTMENT (OUTPATIENT)
Dept: GASTROENTEROLOGY | Facility: CLINIC | Age: 41
End: 2021-12-17
Payer: MEDICAID

## 2021-12-17 VITALS
HEART RATE: 95 BPM | RESPIRATION RATE: 16 BRPM | OXYGEN SATURATION: 98 % | SYSTOLIC BLOOD PRESSURE: 135 MMHG | HEIGHT: 65 IN | DIASTOLIC BLOOD PRESSURE: 84 MMHG | BODY MASS INDEX: 29.99 KG/M2 | TEMPERATURE: 98.4 F | WEIGHT: 180 LBS

## 2021-12-17 DIAGNOSIS — R19.7 DIARRHEA, UNSPECIFIED: ICD-10-CM

## 2021-12-17 DIAGNOSIS — R10.9 UNSPECIFIED ABDOMINAL PAIN: ICD-10-CM

## 2021-12-17 DIAGNOSIS — Z87.19 PERSONAL HISTORY OF OTHER DISEASES OF THE DIGESTIVE SYSTEM: ICD-10-CM

## 2021-12-17 DIAGNOSIS — Z86.2 PERSONAL HISTORY OF DISEASES OF THE BLOOD AND BLOOD-FORMING ORGANS AND CERTAIN DISORDERS INVOLVING THE IMMUNE MECHANISM: ICD-10-CM

## 2021-12-17 DIAGNOSIS — R14.0 ABDOMINAL DISTENSION (GASEOUS): ICD-10-CM

## 2021-12-17 PROCEDURE — 99213 OFFICE O/P EST LOW 20 MIN: CPT

## 2021-12-17 RX ORDER — PANTOPRAZOLE 40 MG/1
40 TABLET, DELAYED RELEASE ORAL
Qty: 90 | Refills: 3 | Status: ACTIVE | COMMUNITY
Start: 2017-04-25 | End: 1900-01-01

## 2021-12-17 NOTE — PHYSICAL EXAM
[General Appearance - Alert] : alert [General Appearance - In No Acute Distress] : in no acute distress [General Appearance - Well Nourished] : well nourished [General Appearance - Well Developed] : well developed [Sclera] : the sclera and conjunctiva were normal [Outer Ear] : the ears and nose were normal in appearance [Neck Appearance] : the appearance of the neck was normal [] : no respiratory distress [Respiration, Rhythm And Depth] : normal respiratory rhythm and effort [Auscultation Breath Sounds / Voice Sounds] : lungs were clear to auscultation bilaterally [Apical Impulse] : the apical impulse was normal [Heart Rate And Rhythm] : heart rate was normal and rhythm regular [Heart Sounds] : normal S1 and S2 [Edema] : there was no peripheral edema [Bowel Sounds] : normal bowel sounds [Abdomen Soft] : soft [Abdomen Tenderness] : non-tender [Abnormal Walk] : normal gait [Skin Color & Pigmentation] : normal skin color and pigmentation [Skin Turgor] : normal skin turgor [Oriented To Time, Place, And Person] : oriented to person, place, and time [Impaired Insight] : insight and judgment were intact [Affect] : the affect was normal [Mood] : the mood was normal

## 2021-12-20 NOTE — HISTORY OF PRESENT ILLNESS
[de-identified] : 40 year old male with ulcerative colitis he was on Entyvio infusion in 2019 with Symptoms were well controlled. Patient however has been [place] border in [place] with his wife, had first child baby girl. Due to travel restrictions with pandemic, he was unable to return to [place] for his scheduled Entyvio. \par His symptoms exacerbated in May 2021 was started on tapering dose Prednisone 40 mg daily and symptoms improved immediately after  started Prednisone . Now he is on Prednisone 2.5 mg for 1 month.\par He is back in [place] last month restarted the Entyvio infusion.  He is due for the 3rd loading dose on 1/6/2022 still feel gassy Some bloating.\par His symptoms better control having formed stool  2-3 times, Denies hematochezia, vomiting, gained weight. \par He is planning to go back [place] and planning to find Gastroenterologist in [place], until than he will continue the Entyvio here. still feel gassy Some bloating.\par He had blood work with his infusion, reviewed the blood work result. \par He has some abdominal bloating was on Pantoprazole in the past \par \par Last colonoscopy was in 2018 , he was advised to repeat colonoscopy last were not done due to pandemic.

## 2021-12-20 NOTE — ASSESSMENT
[FreeTextEntry1] : 40 year old male with ulcerative colitis symptoms improved with Prednisone taper & Entyvio infusion. \par He was on Pantoprazole in the past reordered. \par Reviewed his blood work result from 11/20/2021.\par Dr. Franz came in answered all his question, recommended continue infusion, taper off Prednisone.\par Plan \par Colonoscopy some time in June next year \par Continue the Entyvio as ordered.\par Taper off the Prednisone.\par F/U OV in 3-4 months, earlier if needed.\par Letter provided with need of his travel back and for for the Infusion. \par \par \par

## 2022-01-06 ENCOUNTER — APPOINTMENT (OUTPATIENT)
Dept: RHEUMATOLOGY | Facility: CLINIC | Age: 42
End: 2022-01-06
Payer: MEDICAID

## 2022-01-06 PROCEDURE — 96413 CHEMO IV INFUSION 1 HR: CPT

## 2022-01-12 NOTE — PROVIDER CONTACT NOTE (CRITICAL VALUE NOTIFICATION) - DATE AND TIME:
04-Jan-2017 13:00 Vj Goss is calling to request a refill on the following medication(s):    Last Visit Date (If Applicable):  9/5/6892    Next Visit Date:    Visit date not found    Medication Request:  Requested Prescriptions     Pending Prescriptions Disp Refills    cyclobenzaprine (FLEXERIL) 5 MG tablet 30 tablet 2     Sig: Take 1 tablet by mouth 3 times daily as needed for Muscle spasms

## 2022-02-10 ENCOUNTER — APPOINTMENT (OUTPATIENT)
Dept: GASTROENTEROLOGY | Facility: CLINIC | Age: 42
End: 2022-02-10

## 2022-02-24 NOTE — PATIENT PROFILE ADULT. - NS PRO CONTRA REFUSE FLU INFO
Airway  Performed by: Margie Franklin CRNA  Authorized by: Vince Gomez MD     Final Airway Type:  Endotracheal airway  Final Endotracheal Airway*:  ETT  ETT Size (mm)*:  7.0  Cuff*:  Regular  Technique Used for Successful ETT Placement:  Direct laryngoscopy  Devices/Methods Used in Placement*:  Mask  Intubation Procedure*:  Preoxygenation, ETCO2, Atraumatic, Dentition Unchanged and Pharynx Clear  Insertion Site:  Oral  Blade Type*:  MAC  Blade Size*:  3  Secured at (cm)*:  21  Placement Verified by: auscultation, capnometry and equal breath sounds    Glottic View*:  1 - full view of glottis  Attempts*:  1   Patient Identified, Procedure confirmed, Emergency equipment available and Safety protocols followed  Location:  OR  Urgency:  Elective  Difficult Airway: No    Indications for Airway Management:  Anesthesia  Mask Difficulty Assessment:  1 - vent by mask  CRNA:  Margie Franklin CRNA  Start Time: 2/24/2022 8:58 AM         Risks/benefits discussed with patient or patient surrogate/Vaccine Information Sheet (VIS) provided-VIS date: 8/07/15

## 2022-03-03 RX ORDER — VEDOLIZUMAB 300 MG/5ML
300 INJECTION, POWDER, LYOPHILIZED, FOR SOLUTION INTRAVENOUS
Qty: 1 | Refills: 0 | Status: ACTIVE | OUTPATIENT
Start: 2018-10-30

## 2022-04-25 NOTE — H&P ADULT - NSHPPOADEEPVENOUSTHROMB_GEN_A_CORE
no
Functional oral and pharyngeal stage of swallow given thin liquids, puree and regular solids marked by adequate bolus containment, adequate bolus manipulation, timely mastication with adequate ability to break down solid textures and adequate anterior-posterior transport. adequate oral clearance noted post primary swallow. Pharyngeal stage marked by observed initiation of the pharyngeal swallow trigger judged via laryngeal palpation. No clinically overt s/sx of impaired airway protection observed for all trials.

## 2022-05-03 ENCOUNTER — APPOINTMENT (OUTPATIENT)
Dept: RHEUMATOLOGY | Facility: CLINIC | Age: 42
End: 2022-05-03

## 2022-09-07 ENCOUNTER — NON-APPOINTMENT (OUTPATIENT)
Age: 42
End: 2022-09-07

## 2022-12-02 ENCOUNTER — APPOINTMENT (OUTPATIENT)
Dept: GASTROENTEROLOGY | Facility: CLINIC | Age: 42
End: 2022-12-02

## 2022-12-02 VITALS
SYSTOLIC BLOOD PRESSURE: 127 MMHG | TEMPERATURE: 98.1 F | WEIGHT: 179 LBS | HEART RATE: 96 BPM | OXYGEN SATURATION: 98 % | HEIGHT: 65 IN | DIASTOLIC BLOOD PRESSURE: 88 MMHG | BODY MASS INDEX: 29.82 KG/M2

## 2022-12-02 DIAGNOSIS — K51.90 ULCERATIVE COLITIS, UNSPECIFIED, W/OUT COMPLICATIONS: ICD-10-CM

## 2022-12-02 PROCEDURE — 99213 OFFICE O/P EST LOW 20 MIN: CPT

## 2022-12-02 NOTE — HISTORY OF PRESENT ILLNESS
[FreeTextEntry1] : 41-year-old male\par Longstanding severe ulcerative colitis including hospitalization\par Failure including Humira\par Prior frequent corticosteroid use\par Eventually patient begun on Entyvio with excellent result\par Some interruption in his Entyvio treatment after moving to [place] where he is now  with a young daughter\par Back on Entyvio intravenous maintenance\par Doing extremely well\par Patient brought copy of surveillance colonoscopy from his gastroenterologist in [place]\par Grossly normal examination (biopsy results unavailable)\par \par Intermittent reflux complaints without difficulty swallowing

## 2022-12-02 NOTE — ASSESSMENT
[FreeTextEntry1] : Markedly improved colitis\par Reflux without warning signs to suggest esophagitis\par \par Plan\par Continue Entyvio\par Office visit 1 year, sooner as needed

## 2024-04-05 NOTE — CONSULT NOTE ADULT - ASSESSMENT
Impression:    1. Severe UC (by Brittany/Witts). Unclear if this represents loss of response to adalimumab vs. other superimposed process (bacterial or CMV colopathy). No signs of toxic/fulminant colitis at this time    2. Nausea or vomiting: CT negative for obstruction. Possible due to viral enteritis but more likely just secondary to uncontrolled UC. Pt denies cannibis use.    Recommendation:   -supportive care with IVF, correction of electrolytes PRN  -advance diet as tolerated for now  -check Cdiff, GI PCR, stool culture, Ova and parasites X3 (pt has history of aeromonas so specify that this should be ruled out)  -flex sigmoidoscopy this coming week to assess disease severity and biopsy to r/o CMV  -check adalimumab trough/antibodies if possible  -if infectious workup negative patient may need corticosteroids vs. change to another agent (?Remicade vs. Vedolizumab), patient expresses hesitation about using CS.  -check a CRP Impression:    1. Severe UC (by Brittany/Witts). Unclear if this represents loss of response to adalimumab vs. other superimposed process (bacterial or CMV colopathy). No signs of toxic/fulminant colitis at this time    2. Nausea or vomiting: CT negative for obstruction. Possible due to viral enteritis but more likely just secondary to uncontrolled UC. Pt denies cannibis use.    Recommendation:   -supportive care with IVF, correction of electrolytes PRN, antiemetics for nausea  -advance diet as tolerated for now  -check Cdiff, GI PCR, stool culture, Ova and parasites X3 (pt has history of aeromonas so specify that this should be ruled out)  -flex sigmoidoscopy this coming week to assess disease severity and biopsy to r/o CMV  -check adalimumab trough/antibodies if possible  -if infectious workup negative patient may need corticosteroids vs. change to another agent (?Remicade vs. Vedolizumab), patient expresses hesitation about using CS.  -check a CRP  -I am not aware of any role for empiric antibiotics in the treatment of UC  -if tolerated patient can advance to full liquids and then low residue lactose free diet Impression:    1. Severe UC (by Brittany/[place]mark). Unclear if this represents loss of response to adalimumab vs. other superimposed process (bacterial or CMV colopathy). No signs of toxic/fulminant colitis at this time    2. Nausea or vomiting: CT negative for obstruction. Possible due to viral enteritis but more likely just secondary to uncontrolled UC. Pt denies cannibis use.    3. CORNELL- may be secondary to ATN    Recommendation:   -supportive care with IVF, correction of electrolytes PRN, antiemetics for nausea  -check Cdiff, GI PCR, stool culture, Ova and parasites X3 (pt has history of aeromonas so specify that this should be ruled out)  -flex sigmoidoscopy Monday to assess disease severity and biopsy to r/o CMV, NPO past midnight.  -check adalimumab trough/antibodies if possible  -if infectious workup negative patient may need corticosteroids vs. change to another agent (?Remicade vs. Vedolizumab), patient expresses hesitation about using CS.  -I am not aware of any role for empiric antibiotics in the treatment of UC, can discontinue as higher risk for cdiff.  -if tolerated patient can advance to full liquids and then low residue lactose free diet  -Check UA  -Check quantiferon-GOLD Impression:    1. Severe UC (by Brittany/Witts). Unclear if this represents loss of response to adalimumab vs. other superimposed process (bacterial or CMV colopathy). No signs of toxic/fulminant colitis at this time    2. Nausea or vomiting: CT negative for obstruction. Possible due to viral enteritis but more likely just secondary to uncontrolled UC. Pt denies cannibis use.    3. CORNELL- may be secondary to ATN    Recommendation:   -supportive care with IVF, correction of electrolytes PRN, antiemetics for nausea  -check Cdiff, GI PCR, stool culture, Ova and parasites X3 (pt has history of aeromonas so specify that this should be ruled out)  -flex sigmoidoscopy Monday to assess disease severity and biopsy to r/o CMV, NPO past midnight.  -check adalimumab trough/antibodies if possible  -if infectious workup negative patient may need corticosteroids vs. change to another agent (?Remicade vs. Vedolizumab), patient expresses hesitation about using CS.  -I am not aware of any role for empiric antibiotics in the treatment of UC, can discontinue as higher risk for cdiff.  -if tolerated patient can advance to full liquids and then low residue lactose free diet  -Check UA  -Check quantiferon-GOLD  -change Rowasa enema to Canasa suppository QHS No

## 2024-11-04 NOTE — ED ADULT NURSE NOTE - TEMPLATE
Medication history reviewed with pt. Med rec is complete.  Allergies reviewed, per pt  Interviewed pt with  at bedside with permission from pt.  Had a pleasant conversation with pt and pts     Pt reports no prescription medications in the last 30 days or longer.    Patient has not had any outpatient antibiotics in the last 30 days.    Pt is not on any anticoagulants       Abdominal Pain, N/V/D

## 2024-12-20 NOTE — DISCHARGE NOTE ADULT - PATIENT PORTAL LINK FT
“You can access the [facility] Patient Portal, offered by [facility], by registering with the following website: [email or web address]” DISPLAY PLAN FREE TEXT